# Patient Record
Sex: FEMALE | Race: WHITE | Employment: FULL TIME | URBAN - METROPOLITAN AREA
[De-identification: names, ages, dates, MRNs, and addresses within clinical notes are randomized per-mention and may not be internally consistent; named-entity substitution may affect disease eponyms.]

---

## 2017-04-19 ENCOUNTER — HOSPITAL ENCOUNTER (OUTPATIENT)
Age: 27
Setting detail: SPECIMEN
Discharge: HOME | End: 2017-04-19
Payer: OTHER GOVERNMENT

## 2017-04-19 ENCOUNTER — OFFICE VISIT (OUTPATIENT)
Dept: OBGYN CLINIC | Age: 27
End: 2017-04-19
Payer: OTHER GOVERNMENT

## 2017-04-19 VITALS
RESPIRATION RATE: 16 BRPM | DIASTOLIC BLOOD PRESSURE: 83 MMHG | WEIGHT: 152 LBS | OXYGEN SATURATION: 100 % | SYSTOLIC BLOOD PRESSURE: 113 MMHG | HEIGHT: 62 IN | HEART RATE: 103 BPM | BODY MASS INDEX: 27.97 KG/M2

## 2017-04-19 DIAGNOSIS — Z01.419 ENCOUNTER FOR GYNECOLOGICAL EXAMINATION WITHOUT ABNORMAL FINDING: Primary | ICD-10-CM

## 2017-04-19 PROCEDURE — 99385 PREV VISIT NEW AGE 18-39: CPT | Performed by: OBSTETRICS & GYNECOLOGY

## 2017-04-19 RX ORDER — TRAZODONE HYDROCHLORIDE 50 MG/1
1 TABLET ORAL
COMMUNITY
Start: 2017-03-24 | End: 2018-02-12

## 2017-04-19 RX ORDER — CYCLOBENZAPRINE HCL 10 MG
1 TABLET ORAL
COMMUNITY
Start: 2017-02-18 | End: 2018-02-12

## 2017-04-19 RX ORDER — SERTRALINE HYDROCHLORIDE 100 MG/1
1 TABLET, FILM COATED ORAL
COMMUNITY
Start: 2017-03-24 | End: 2018-02-12

## 2017-04-19 RX ORDER — TRAMADOL HYDROCHLORIDE 50 MG/1
1 TABLET ORAL
COMMUNITY
Start: 2017-02-02 | End: 2018-02-12

## 2017-04-19 RX ORDER — IBUPROFEN 800 MG/1
1 TABLET ORAL
COMMUNITY
Start: 2017-02-02 | End: 2018-02-12

## 2017-04-19 RX ORDER — CYCLOBENZAPRINE HCL 5 MG
1 TABLET ORAL
COMMUNITY
Start: 2017-02-02 | End: 2018-02-12

## 2017-04-19 RX ORDER — LAMOTRIGINE 25 MG/1
1 TABLET ORAL
COMMUNITY
Start: 2017-03-24 | End: 2018-02-12

## 2017-04-20 RX ORDER — LEVONORGESTREL AND ETHINYL ESTRADIOL 0.15-0.03
1 KIT ORAL DAILY
Qty: 1 PACKET | Refills: 11 | Status: SHIPPED | OUTPATIENT
Start: 2017-04-20 | End: 2018-04-16 | Stop reason: ALTCHOICE

## 2017-04-26 LAB — CYTOLOGY REPORT: NORMAL

## 2018-02-12 ENCOUNTER — OFFICE VISIT (OUTPATIENT)
Dept: OBGYN CLINIC | Age: 28
End: 2018-02-12
Payer: OTHER GOVERNMENT

## 2018-02-12 VITALS
BODY MASS INDEX: 25.23 KG/M2 | HEART RATE: 89 BPM | OXYGEN SATURATION: 99 % | HEIGHT: 62 IN | WEIGHT: 137.13 LBS | DIASTOLIC BLOOD PRESSURE: 86 MMHG | SYSTOLIC BLOOD PRESSURE: 118 MMHG

## 2018-02-12 DIAGNOSIS — B37.9 YEAST INFECTION: Primary | ICD-10-CM

## 2018-02-12 PROCEDURE — 99213 OFFICE O/P EST LOW 20 MIN: CPT | Performed by: ADVANCED PRACTICE MIDWIFE

## 2018-02-12 RX ORDER — FLUCONAZOLE 150 MG/1
150 TABLET ORAL ONCE
Qty: 1 TABLET | Refills: 0 | Status: SHIPPED | OUTPATIENT
Start: 2018-02-12 | End: 2018-02-12

## 2018-02-12 NOTE — PROGRESS NOTES
tender to touch. Brown discharge noted      Wet Prep: pH 4.5, lack of lactobacilli, epithelial cells WNL, whiff negative, buds present      ASSESSMENT:  Vaginal yeast infection      PLAN;   Oral Diflucan ordered. Will f/up in 5 days if no improvement of signs and symptoms. Discussed vitamin E oil to ease external irritation and only washing with warm water. Pt reported verbal understanding. Return for annual exam.  Pap per current recommendations. Patient was seen with total face to face time of 15 minutes. More than 50% of this visit was on counseling and education regarding her   1. Yeast infection     and her options. She was also counseled on her preventative health maintenance recommendations and follow-up.

## 2018-02-14 ENCOUNTER — OFFICE VISIT (OUTPATIENT)
Dept: FAMILY MEDICINE CLINIC | Age: 28
End: 2018-02-14
Payer: OTHER GOVERNMENT

## 2018-02-14 ENCOUNTER — HOSPITAL ENCOUNTER (OUTPATIENT)
Age: 28
Setting detail: SPECIMEN
Discharge: HOME | End: 2018-02-14
Payer: OTHER GOVERNMENT

## 2018-02-14 VITALS
RESPIRATION RATE: 16 BRPM | BODY MASS INDEX: 24.29 KG/M2 | HEART RATE: 96 BPM | SYSTOLIC BLOOD PRESSURE: 107 MMHG | WEIGHT: 132 LBS | DIASTOLIC BLOOD PRESSURE: 65 MMHG | OXYGEN SATURATION: 99 % | HEIGHT: 62 IN

## 2018-02-14 DIAGNOSIS — F41.9 ANXIETY AND DEPRESSION: ICD-10-CM

## 2018-02-14 DIAGNOSIS — Z76.89 ENCOUNTER TO ESTABLISH CARE: ICD-10-CM

## 2018-02-14 DIAGNOSIS — Z76.89 ENCOUNTER TO ESTABLISH CARE: Primary | ICD-10-CM

## 2018-02-14 DIAGNOSIS — F17.200 SMOKING: ICD-10-CM

## 2018-02-14 DIAGNOSIS — F43.10 PTSD (POST-TRAUMATIC STRESS DISORDER): ICD-10-CM

## 2018-02-14 DIAGNOSIS — F32.A ANXIETY AND DEPRESSION: ICD-10-CM

## 2018-02-14 LAB
ABSOLUTE EOS #: 0.09 K/UL (ref 0–0.44)
ABSOLUTE IMMATURE GRANULOCYTE: <0.03 K/UL (ref 0–0.3)
ABSOLUTE LYMPH #: 2.37 K/UL (ref 1.1–3.7)
ABSOLUTE MONO #: 0.77 K/UL (ref 0.1–1.2)
ALBUMIN SERPL-MCNC: 4.3 G/DL (ref 3.5–5.2)
ALBUMIN/GLOBULIN RATIO: 1.7 (ref 1–2.5)
ALP BLD-CCNC: 57 U/L (ref 35–104)
ALT SERPL-CCNC: 19 U/L (ref 5–33)
ANION GAP SERPL CALCULATED.3IONS-SCNC: 13 MMOL/L (ref 9–17)
AST SERPL-CCNC: 15 U/L
BASOPHILS # BLD: 1 % (ref 0–2)
BASOPHILS ABSOLUTE: 0.04 K/UL (ref 0–0.2)
BILIRUB SERPL-MCNC: 1.35 MG/DL (ref 0.3–1.2)
BUN BLDV-MCNC: 10 MG/DL (ref 6–20)
BUN/CREAT BLD: ABNORMAL (ref 9–20)
CALCIUM SERPL-MCNC: 8.9 MG/DL (ref 8.6–10.4)
CHLORIDE BLD-SCNC: 102 MMOL/L (ref 98–107)
CHOLESTEROL/HDL RATIO: 3.6
CHOLESTEROL: 183 MG/DL
CO2: 25 MMOL/L (ref 20–31)
CREAT SERPL-MCNC: 0.72 MG/DL (ref 0.5–0.9)
DIFFERENTIAL TYPE: NORMAL
EOSINOPHILS RELATIVE PERCENT: 1 % (ref 1–4)
GFR AFRICAN AMERICAN: >60 ML/MIN
GFR NON-AFRICAN AMERICAN: >60 ML/MIN
GFR SERPL CREATININE-BSD FRML MDRD: ABNORMAL ML/MIN/{1.73_M2}
GFR SERPL CREATININE-BSD FRML MDRD: ABNORMAL ML/MIN/{1.73_M2}
GLUCOSE BLD-MCNC: 93 MG/DL (ref 70–99)
HCT VFR BLD CALC: 43.7 % (ref 36.3–47.1)
HDLC SERPL-MCNC: 51 MG/DL
HEMOGLOBIN: 14.1 G/DL (ref 11.9–15.1)
IMMATURE GRANULOCYTES: 0 %
LDL CHOLESTEROL: 117 MG/DL (ref 0–130)
LYMPHOCYTES # BLD: 33 % (ref 24–43)
MCH RBC QN AUTO: 30.5 PG (ref 25.2–33.5)
MCHC RBC AUTO-ENTMCNC: 32.3 G/DL (ref 28.4–34.8)
MCV RBC AUTO: 94.6 FL (ref 82.6–102.9)
MONOCYTES # BLD: 11 % (ref 3–12)
NRBC AUTOMATED: 0 PER 100 WBC
PDW BLD-RTO: 12.5 % (ref 11.8–14.4)
PLATELET # BLD: 212 K/UL (ref 138–453)
PLATELET ESTIMATE: NORMAL
PMV BLD AUTO: 12.2 FL (ref 8.1–13.5)
POTASSIUM SERPL-SCNC: 3.7 MMOL/L (ref 3.7–5.3)
RBC # BLD: 4.62 M/UL (ref 3.95–5.11)
RBC # BLD: NORMAL 10*6/UL
SEG NEUTROPHILS: 54 % (ref 36–65)
SEGMENTED NEUTROPHILS ABSOLUTE COUNT: 4 K/UL (ref 1.5–8.1)
SODIUM BLD-SCNC: 140 MMOL/L (ref 135–144)
TOTAL PROTEIN: 6.9 G/DL (ref 6.4–8.3)
TRIGL SERPL-MCNC: 74 MG/DL
TSH SERPL DL<=0.05 MIU/L-ACNC: 2.39 MIU/L (ref 0.3–5)
VITAMIN B-12: 567 PG/ML (ref 232–1245)
VLDLC SERPL CALC-MCNC: NORMAL MG/DL (ref 1–30)
WBC # BLD: 7.3 K/UL (ref 3.5–11.3)
WBC # BLD: NORMAL 10*3/UL

## 2018-02-14 PROCEDURE — 99203 OFFICE O/P NEW LOW 30 MIN: CPT | Performed by: FAMILY MEDICINE

## 2018-02-14 RX ORDER — BUPROPION HYDROCHLORIDE 150 MG/1
150 TABLET ORAL 2 TIMES DAILY
Qty: 60 TABLET | Refills: 0 | Status: SHIPPED | OUTPATIENT
Start: 2018-02-14 | End: 2018-03-14

## 2018-02-14 ASSESSMENT — PATIENT HEALTH QUESTIONNAIRE - PHQ9
SUM OF ALL RESPONSES TO PHQ9 QUESTIONS 1 & 2: 2
1. LITTLE INTEREST OR PLEASURE IN DOING THINGS: 1
2. FEELING DOWN, DEPRESSED OR HOPELESS: 1
SUM OF ALL RESPONSES TO PHQ QUESTIONS 1-9: 2

## 2018-02-14 ASSESSMENT — ENCOUNTER SYMPTOMS
BLOOD IN STOOL: 0
EYE PAIN: 0
SHORTNESS OF BREATH: 0
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
## 2018-02-21 ENCOUNTER — OFFICE VISIT (OUTPATIENT)
Dept: PSYCHOLOGY | Age: 28
End: 2018-02-21
Payer: OTHER GOVERNMENT

## 2018-02-21 DIAGNOSIS — F33.1 MODERATE EPISODE OF RECURRENT MAJOR DEPRESSIVE DISORDER (HCC): Primary | ICD-10-CM

## 2018-02-21 PROCEDURE — 99999 PR OFFICE/OUTPT VISIT,PROCEDURE ONLY: CPT | Performed by: PSYCHOLOGIST

## 2018-02-21 PROCEDURE — 90834 PSYTX W PT 45 MINUTES: CPT | Performed by: PSYCHOLOGIST

## 2018-02-21 ASSESSMENT — PATIENT HEALTH QUESTIONNAIRE - PHQ9
8. MOVING OR SPEAKING SO SLOWLY THAT OTHER PEOPLE COULD HAVE NOTICED. OR THE OPPOSITE, BEING SO FIGETY OR RESTLESS THAT YOU HAVE BEEN MOVING AROUND A LOT MORE THAN USUAL: 2
2. FEELING DOWN, DEPRESSED OR HOPELESS: 1
SUM OF ALL RESPONSES TO PHQ9 QUESTIONS 1 & 2: 4
10. IF YOU CHECKED OFF ANY PROBLEMS, HOW DIFFICULT HAVE THESE PROBLEMS MADE IT FOR YOU TO DO YOUR WORK, TAKE CARE OF THINGS AT HOME, OR GET ALONG WITH OTHER PEOPLE: 2
5. POOR APPETITE OR OVEREATING: 1
6. FEELING BAD ABOUT YOURSELF - OR THAT YOU ARE A FAILURE OR HAVE LET YOURSELF OR YOUR FAMILY DOWN: 1
SUM OF ALL RESPONSES TO PHQ QUESTIONS 1-9: 16
9. THOUGHTS THAT YOU WOULD BE BETTER OFF DEAD, OR OF HURTING YOURSELF: 0
3. TROUBLE FALLING OR STAYING ASLEEP: 3
1. LITTLE INTEREST OR PLEASURE IN DOING THINGS: 3
4. FEELING TIRED OR HAVING LITTLE ENERGY: 3
7. TROUBLE CONCENTRATING ON THINGS, SUCH AS READING THE NEWSPAPER OR WATCHING TELEVISION: 2
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
## 2018-02-21 NOTE — PATIENT INSTRUCTIONS
others, avoiding friends/family. Restlessness, difficulty concentrating or paying attention. Trouble making decisions. Physical:  Exhaustion/Fatigue  Decreased energy  Memory problems  Upset stomach, nausea, vomiting  Vague Pain (Aches), Headaches/Migraines    Additional symptoms that should be concerning and are a signal for professional consultation or intervention include:  Use of drugs, alcohol, violence, and thoughts of killing oneself. If you are thinking about suicide contact 911 or go directly to the Emergency Room. The Stages of Grief  Grief therapists often describe stages of grief outlined by the research of Dr. Lorena Helton. These stages do not always go in order. You may move back and forth among some of the stages and may even skip some of them. These stages are meant as a guide to help you understand your reactions and those of others who are grieving.    - Denial:  Denial (not acknowledging the loss) can help contain the shock of loss. Denial can act as a safety mechanism to block out grief until we are ready to handle it. - Sadness and depression:  Deep, intense grief and mourning appear during this stage. When the full understanding of our loss comes, it can seem overwhelming. During this stage, you may cry often and unexpectedly. You may not want to be around people or to do things that you normally enjoy. During this stage, it is best to remain as active as possible and to seek supportive people who will allow you to say what you need to or to cry when you need to. It is important to allow yourself to work through your full range and experience of emotions. - Anger:  Rage and anger can be intense toward the person who , toward friends and relatives, towards yourself, and even toward God. It is important to have an outlet to release anger through activities such as exercise, hobbies, Anabaptist ceremonies or meeting with Rastafarian leader(s), or through therapy.  Guilt, invigorating. Sometimes try walking slowly enough so you can look carefully at what you see. Observe what nature has to offer you, what it can teach you. Enjoy as much as you are able to of the sights and sounds that come your way. If you like, walk with another person. 3. Carry or wear a linking object. Carry something in your pocket or purse that reminds you of the one who  - a keepsake they gave you perhaps, or small object they once carried or used or a memento you select for just this purpose. You might wear a piece of their jewelry in the same way. Whenever you want, reach for gaze upon this object and remember what it signifies. 4. Visit the grave. Not all people prefer to do this. But if it feels right to you, then do so. Dont let others convince you this is a morbid thing to do. Spend whatever time feels right there. Stand or sit in the quietness and do what comes naturally: be silent or talk, breathe deeply or cry, recollect or pray. You may wish to add your distinctive touch to the gravesite - straighten it a bit, or add little signs of your love. 5. Create a memory book. Compile photographs, which document your loved ones life. Arrange them into some sort of order so they tell a story. Add other elements if you want: diplomas, newspaper clippings, awards, accomplishments, and reminders of significant events. Put all this in a special binder and keep it for other people to look at if they wish. Go through it on your own if you desire. Reminisce as you do so. 6. Recall your dreams. Your dreams often have important things to say about your feelings and about your relationship with the one who . Your dreams may be scary or sad, especially early on. They may seem weird or crazy to you. You may find that your loved one appears in your dreams. Accept your dreams for what they are and see what you can learn from them. No one knows that better than you.     7. Tell people what helps you and what doesnt. People around you may not understand what you need. So tell them. If hearing your loved ones name spoken aloud by others feels good, say so. If you need more time alone, or assistance with chores youre unable to complete, or an occasional hug, be honest.  People cant read your mind, so youll have to speak it. 8. Write things down. Most people who are grieving become more forgetful than usual.  So help yourself remember what you want by keeping track of it on paper or with whatever system works best for you. This may include writing down things you want to preserve about the person who has . 9. Ask for a copy of the Russell's. If the  liturgy or memorial service holds special meaning for you because of what was spoken or read, ask for the words. Whoever participated in that ritual will feel gratified that what they prepared was appreciated. Turn to these words whenever you want. Some people find these thoughts provide even more help weeks and months after the service. 10. Remember the serenity prayer. This prayer is attributed to theologian Ignacio Lowe, but its actually an ancient  [facility]. It has brought comfort and support to many that have suffered various kinds of afflictions. 11. Create a memory area at home. In a space that feels appropriate, arrange a small table that honors the person: a framed photograph or two, perhaps a prized possession or award or something they created or something they loved. This might be placed on a small table, a mantel or a desk. Some people like to use a grouping of candles, representing not just the person who  but others who have  as well. In that case a variety of candles can be arranged each representing a unique life. 12. Drink water. Grieving people can easily become dehydrated. Crying can naturally lead to that.   And with your normal routines turned upside down,

## 2018-02-28 ENCOUNTER — OFFICE VISIT (OUTPATIENT)
Dept: PSYCHOLOGY | Age: 28
End: 2018-02-28
Payer: OTHER GOVERNMENT

## 2018-02-28 DIAGNOSIS — F33.1 MODERATE EPISODE OF RECURRENT MAJOR DEPRESSIVE DISORDER (HCC): Primary | ICD-10-CM

## 2018-02-28 DIAGNOSIS — F43.10 POSTTRAUMATIC STRESS DISORDER: ICD-10-CM

## 2018-02-28 PROCEDURE — 90837 PSYTX W PT 60 MINUTES: CPT | Performed by: PSYCHOLOGIST

## 2018-02-28 NOTE — PROGRESS NOTES
her. Discussed recommendation for more long-term services. O:  MSE:   Mood was anxious with congruent affect. Suicidal ideation was denied. Homicidal ideation was denied. Hygiene was good . Dress was appropriate. Behavior was Restless & fidgety with No observation or self-report of difficulties ambulating. Attitude was Help-seeking. Eye-contact was good. Speech: rate- WNL, rhythm-  WNL, volume- WNL  Verbalizations were  tangential and verbose. Thought processes were not intact and goal-oriented without evidence of delusions, hallucinations, obsessions, or libia; with significant cognitive distortions. Associations were characterized by tangential and flight of ideas cognitive processes. Pt was orientated oriented to person, place, time, and general circumstances;  Memory was good  Insight and judgment were estimated to be fair, AEB, a fair  understanding of cyclical maladaptive patterns, and the ability to use insight to inform behavior change. A:   PHQ-9 Total Score: 16 (2/21/2018 10:25 AM)    Patient has a history of trauma and difficulties with her attachment figures. These experiences have impacted her view of the world. She would benefit from mor long-term specialized services to address her trauma history and help her process through the grief of the death of her support animal whom she was very attached to. She would benefit from learning alternative coping strategies. Pt interventions:  Provided handout on  grounding exercises, Discussed self-care (sleep, nutrition, rewarding activities, social support, exercise), Discussed benefits of referral for specialty care, Motivational Interviewing to increase patient confidence and compliance with adhering to behavioral change plan, Supportive techniques, Provided Psychoeducation re: grounding exercises  and Emphasized importance of regular practice of relaxation strategies to target / promote stress reduction. Discussed grief.        Pt Behavioral Change Plan:   Patient Instructions   1. Read grief handouts   2. Engage in self-care activities   3. Use relaxation/mood Apps  4. Read grounding exercises and practice   5. Search for individual therapy providers:  -www. [email or web address].[email or web address]. Hively  -[email or web address] [email or web address]/ProviderSelection/  -http://TripHobo/Insurance.htm    Apps:    \"calm\"    [email or web address]. com    Mood tracker    Relax    Calendar 31     Stop, Breathe, and Think    headspace    Stop panic     Meditation studio    Momentum    Mindfulness         Diagnosis:  The primary encounter diagnosis was Moderate episode of recurrent major depressive disorder ([facility] [address] A diagnosis of Posttraumatic stress disorder was also pertinent to this visit. History:    Medications:   Current Outpatient Prescriptions   Medication Sig Dispense Refill    buPROPion (WELLBUTRIN XL) 150 MG extended release tablet Take 1 tablet by mouth 2 times daily 60 tablet 0    levonorgestrel-ethinyl estradiol (LEVORA 0.15/30, 28,) 0.15-30 MG-MCG per tablet Take 1 tablet by mouth daily 1 packet 11     No current facility-administered medications for this visit. Social History:   Social History     Social History    Marital status:      Spouse name: N/A    Number of children: N/A    Years of education: N/A     Occupational History    Not on file. Social History Main Topics    Smoking status: Current Every Day Smoker    Smokeless tobacco: Never Used      Comment: zaps     Alcohol use Yes      Comment: social     Drug use: No    Sexual activity: Yes     Partners: Male     Other Topics Concern    Not on file     Social History Narrative    No narrative on file       TOBACCO:   reports that she has been smoking. She has never used smokeless tobacco.  ETOH:   reports that she drinks alcohol.     Family History:   Family History   Problem Relation Age of Onset    Depression Mother     Physical Abuse Father     Coronary Art Dis Maternal

## 2018-02-28 NOTE — PATIENT INSTRUCTIONS
1. Read grief handouts   2. Engage in self-care activities   3. Use relaxation/mood Apps  4. Read grounding exercises and practice   5. Search for individual therapy providers:  -www. Lockr. [employer]  -[email or web address]/ProviderSelection/  -http://Zaelab/Insurance.htm    Apps:    \"calm\"    [email or web address]€™s. com    Mood tracker    Relax    Calendar 31     Stop, Breathe, and Think    headspace    Stop panic     Meditation studio    Momentum    Mindfulness

## 2018-03-14 ENCOUNTER — OFFICE VISIT (OUTPATIENT)
Dept: FAMILY MEDICINE CLINIC | Age: 28
End: 2018-03-14
Payer: OTHER GOVERNMENT

## 2018-03-14 VITALS
OXYGEN SATURATION: 96 % | RESPIRATION RATE: 16 BRPM | HEART RATE: 83 BPM | SYSTOLIC BLOOD PRESSURE: 121 MMHG | WEIGHT: 135.4 LBS | BODY MASS INDEX: 24.92 KG/M2 | DIASTOLIC BLOOD PRESSURE: 81 MMHG | HEIGHT: 62 IN

## 2018-03-14 DIAGNOSIS — F17.200 SMOKING: ICD-10-CM

## 2018-03-14 DIAGNOSIS — F41.9 ANXIETY AND DEPRESSION: Primary | ICD-10-CM

## 2018-03-14 DIAGNOSIS — Z23 NEED FOR TDAP VACCINATION: ICD-10-CM

## 2018-03-14 DIAGNOSIS — Z23 NEED FOR PNEUMOCOCCAL VACCINATION: ICD-10-CM

## 2018-03-14 DIAGNOSIS — F43.10 PTSD (POST-TRAUMATIC STRESS DISORDER): ICD-10-CM

## 2018-03-14 DIAGNOSIS — Z13.31 POSITIVE DEPRESSION SCREENING: ICD-10-CM

## 2018-03-14 DIAGNOSIS — F32.A ANXIETY AND DEPRESSION: Primary | ICD-10-CM

## 2018-03-14 PROCEDURE — 90472 IMMUNIZATION ADMIN EACH ADD: CPT | Performed by: FAMILY MEDICINE

## 2018-03-14 PROCEDURE — G8431 POS CLIN DEPRES SCRN F/U DOC: HCPCS | Performed by: FAMILY MEDICINE

## 2018-03-14 PROCEDURE — 90732 PPSV23 VACC 2 YRS+ SUBQ/IM: CPT | Performed by: FAMILY MEDICINE

## 2018-03-14 PROCEDURE — 99214 OFFICE O/P EST MOD 30 MIN: CPT | Performed by: FAMILY MEDICINE

## 2018-03-14 PROCEDURE — 90715 TDAP VACCINE 7 YRS/> IM: CPT | Performed by: FAMILY MEDICINE

## 2018-03-14 PROCEDURE — 90471 IMMUNIZATION ADMIN: CPT | Performed by: FAMILY MEDICINE

## 2018-03-14 ASSESSMENT — ENCOUNTER SYMPTOMS
SHORTNESS OF BREATH: 0
EYE PAIN: 0
BLOOD IN STOOL: 0

## 2018-03-14 NOTE — PROGRESS NOTES
1 tablet by mouth 2 times daily, Disp: 60 tablet, Rfl: 0    levonorgestrel-ethinyl estradiol (LEVORA 0.15/30, 28,) 0.15-30 MG-MCG per tablet, Take 1 tablet by mouth daily, Disp: 1 packet, Rfl: 11    Subjective:     Review of Systems   Constitutional: Negative for appetite change, diaphoresis, fever and unexpected weight change. HENT: Negative for ear pain. Eyes: Negative for pain. Respiratory: Negative for shortness of breath. Cardiovascular: Negative for chest pain and leg swelling. Gastrointestinal: Negative for blood in stool. Psychiatric/Behavioral: Positive for dysphoric mood. Negative for suicidal ideas. The patient is nervous/anxious. Objective:     /81   Pulse 83   Resp 16   Ht 5' 2.01\" (1.575 m)   Wt 135 lb 6.4 oz (61.4 kg)   LMP 02/07/2018   SpO2 96%   BMI 24.76 kg/m²     Physical Exam   Constitutional: She is oriented to person, place, and time. She appears well-developed. HENT:   Head: Normocephalic. Eyes: Conjunctivae and EOM are normal.   Cardiovascular: Normal rate, regular rhythm and normal heart sounds. No murmur heard. Pulmonary/Chest: Effort normal and breath sounds normal. No respiratory distress. She has no wheezes. Neurological: She is alert and oriented to person, place, and time. Skin: She is not diaphoretic. Psychiatric: She has a normal mood and affect. Her behavior is normal. Judgment and thought content normal.       Assessment & Plan:      1. Anxiety and depression, PTSD  Patient stopped the Wellbutrin as it was worsening her cravings for cigarettes. She is agreeable to trying Zoloft as it worked well for her in the past.  She'll follow-up in one month for reassessment. She'll continue seeing behavioral health. - sertraline (ZOLOFT) 50 MG tablet; Take 1 tablet by mouth daily  Dispense: 30 tablet; Refill: 0    Smoking  Tried chantix in the past and found she became aggressive (become arrested).  Has the toll-free number for the smoking

## 2018-04-09 PROBLEM — B37.9 YEAST INFECTION: Status: RESOLVED | Noted: 2018-02-12 | Resolved: 2018-04-09

## 2018-04-16 ENCOUNTER — HOSPITAL ENCOUNTER (OUTPATIENT)
Age: 28
Setting detail: SPECIMEN
Discharge: HOME | End: 2018-04-16
Payer: OTHER GOVERNMENT

## 2018-04-16 ENCOUNTER — OFFICE VISIT (OUTPATIENT)
Dept: OBGYN CLINIC | Age: 28
End: 2018-04-16
Payer: OTHER GOVERNMENT

## 2018-04-16 ENCOUNTER — OFFICE VISIT (OUTPATIENT)
Dept: FAMILY MEDICINE CLINIC | Age: 28
End: 2018-04-16
Payer: OTHER GOVERNMENT

## 2018-04-16 VITALS
WEIGHT: 137 LBS | HEART RATE: 88 BPM | BODY MASS INDEX: 25.21 KG/M2 | DIASTOLIC BLOOD PRESSURE: 84 MMHG | SYSTOLIC BLOOD PRESSURE: 124 MMHG | HEIGHT: 62 IN

## 2018-04-16 VITALS
SYSTOLIC BLOOD PRESSURE: 107 MMHG | DIASTOLIC BLOOD PRESSURE: 72 MMHG | WEIGHT: 137 LBS | BODY MASS INDEX: 25.21 KG/M2 | HEART RATE: 89 BPM | HEIGHT: 62 IN

## 2018-04-16 DIAGNOSIS — F41.9 ANXIETY AND DEPRESSION: Primary | ICD-10-CM

## 2018-04-16 DIAGNOSIS — F43.10 PTSD (POST-TRAUMATIC STRESS DISORDER): ICD-10-CM

## 2018-04-16 DIAGNOSIS — Z01.419 WELL WOMAN EXAM WITH ROUTINE GYNECOLOGICAL EXAM: Primary | ICD-10-CM

## 2018-04-16 DIAGNOSIS — F32.A ANXIETY AND DEPRESSION: Primary | ICD-10-CM

## 2018-04-16 PROBLEM — Z98.891 HISTORY OF LOW TRANSVERSE CESAREAN SECTION: Status: ACTIVE | Noted: 2018-04-16

## 2018-04-16 PROCEDURE — 99213 OFFICE O/P EST LOW 20 MIN: CPT | Performed by: FAMILY MEDICINE

## 2018-04-16 PROCEDURE — 99395 PREV VISIT EST AGE 18-39: CPT | Performed by: OBSTETRICS & GYNECOLOGY

## 2018-04-16 RX ORDER — LEVONORGESTREL AND ETHINYL ESTRADIOL 0.15-0.03
1 KIT ORAL DAILY
COMMUNITY
End: 2018-08-28 | Stop reason: ALTCHOICE

## 2018-04-16 RX ORDER — SERTRALINE HYDROCHLORIDE 100 MG/1
100 TABLET, FILM COATED ORAL DAILY
Qty: 30 TABLET | Refills: 0 | Status: SHIPPED | OUTPATIENT
Start: 2018-04-16 | End: 2018-09-16

## 2018-04-16 RX ORDER — BUSPIRONE HYDROCHLORIDE 5 MG/1
5 TABLET ORAL 3 TIMES DAILY
Qty: 90 TABLET | Refills: 0 | Status: SHIPPED | OUTPATIENT
Start: 2018-04-16 | End: 2018-09-16

## 2018-04-16 RX ORDER — MELOXICAM 15 MG/1
15 TABLET ORAL DAILY
COMMUNITY
End: 2018-04-16 | Stop reason: ALTCHOICE

## 2018-04-16 ASSESSMENT — ENCOUNTER SYMPTOMS
BLOOD IN STOOL: 0
EYE PAIN: 0
SHORTNESS OF BREATH: 0

## 2018-04-26 RX ORDER — LEVONORGESTREL AND ETHINYL ESTRADIOL 0.15-0.03
1 KIT ORAL DAILY
Qty: 1 PACKET | Refills: 11 | Status: SHIPPED | OUTPATIENT
Start: 2018-04-26 | End: 2018-08-28 | Stop reason: ALTCHOICE

## 2018-05-03 LAB — CYTOLOGY REPORT: NORMAL

## 2018-06-04 ENCOUNTER — TELEPHONE (OUTPATIENT)
Dept: FAMILY MEDICINE CLINIC | Age: 28
End: 2018-06-04

## 2018-06-07 ENCOUNTER — OFFICE VISIT (OUTPATIENT)
Dept: FAMILY MEDICINE CLINIC | Age: 28
End: 2018-06-07
Payer: OTHER GOVERNMENT

## 2018-06-07 VITALS
BODY MASS INDEX: 25.4 KG/M2 | HEART RATE: 90 BPM | HEIGHT: 62 IN | OXYGEN SATURATION: 97 % | RESPIRATION RATE: 16 BRPM | WEIGHT: 138 LBS | DIASTOLIC BLOOD PRESSURE: 79 MMHG | SYSTOLIC BLOOD PRESSURE: 107 MMHG

## 2018-06-07 DIAGNOSIS — R23.8 CHANGE OF SKIN COLOR: Primary | ICD-10-CM

## 2018-06-07 PROCEDURE — 99213 OFFICE O/P EST LOW 20 MIN: CPT | Performed by: FAMILY MEDICINE

## 2018-06-07 ASSESSMENT — ENCOUNTER SYMPTOMS
CHEST TIGHTNESS: 0
COLOR CHANGE: 1
SHORTNESS OF BREATH: 0

## 2018-08-23 LAB
ANA TITER: NORMAL
C-REACTIVE PROTEIN: NORMAL
RHEUMATOID FACTOR: NORMAL
SEDIMENTATION RATE, ERYTHROCYTE: NORMAL

## 2018-08-27 DIAGNOSIS — R23.8 CHANGE OF SKIN COLOR: ICD-10-CM

## 2018-08-28 ENCOUNTER — OFFICE VISIT (OUTPATIENT)
Dept: OBGYN CLINIC | Age: 28
End: 2018-08-28
Payer: OTHER GOVERNMENT

## 2018-08-28 VITALS
HEART RATE: 78 BPM | BODY MASS INDEX: 26.51 KG/M2 | WEIGHT: 145 LBS | DIASTOLIC BLOOD PRESSURE: 85 MMHG | SYSTOLIC BLOOD PRESSURE: 127 MMHG

## 2018-08-28 DIAGNOSIS — N76.0 ACUTE VAGINITIS: Primary | ICD-10-CM

## 2018-08-28 PROCEDURE — 99213 OFFICE O/P EST LOW 20 MIN: CPT | Performed by: OBSTETRICS & GYNECOLOGY

## 2018-08-28 NOTE — PROGRESS NOTES
The patient was seen today. She is here regarding med management . Her bowels are regular and she is voiding without difficulty. HPI: 28yo with vaginal dryness and tearing on OCP wants to return to patch to control cycles which she did well on when she was younger. Does not like lubricants because they cause a yeast infection. Past Medical History:   Diagnosis Date    Anxiety     Depression     HPV (human papilloma virus) anogenital infection     PTSD (post-traumatic stress disorder)      Past Surgical History:   Procedure Laterality Date     SECTION  2008    TONSILLECTOMY      WISDOM TOOTH EXTRACTION       REVIEW OF SYSTEMS:        A minimum of an eleven point review of systems was completed and found to be negative except for the pertinent positives found below. Constitutional: No fever, chills or malaise; No weight change or fatigue  Head and Eyes: No vision, Headache, Dizziness or trauma in last 12 months  ENT ROS: No hearing, Tinnitis, sinus or taste problems  Hematological and Lymphatic ROS:No Lymphoma, Von Willebrand's, Hemophillia or Bleeding History  Psych ROS: No Depression, Homicidal thoughts,suicidal thoughts, or anxiety  Breast ROS: No prior breast abnormalities or lumps  Respiratory ROS: No SOB, Pneumoniae,Cough, or Pulmonary Embolism History  Cardiovascular ROS: No Chest Pain with Exertion, Palpitations, Syncope, Edema, Arrhythmia  Gastrointestinal ROS: No Indigestion, Heartburn, Nausea, vomiting, Diahrea, Constipation,or Bowel Changes; No Bloody Stools or melena  Genito-Urinary ROS: No Dysuria, Hematuria or Nocturia.  No Urinary Incontinence or Vaginal Discharge  Musculoskeletal ROS: No Arthralgia, Arthritis,Gout,Osteoporosis or Rheumatism  Neurological ROS: No CVA, Migraines, Epilepsy, Seizure Hx, or Limb Weakness  Dermatological ROS: No Rash, Itching, Hives, Mole Changes or Cancer                                            Blood pressure 127/85, pulse 78, weight 145 lb (65.8

## 2018-09-16 ENCOUNTER — APPOINTMENT (OUTPATIENT)
Dept: GENERAL RADIOLOGY | Age: 28
End: 2018-09-16
Payer: OTHER GOVERNMENT

## 2018-09-16 ENCOUNTER — HOSPITAL ENCOUNTER (EMERGENCY)
Age: 28
Discharge: HOME | End: 2018-09-16
Attending: EMERGENCY MEDICINE
Payer: OTHER GOVERNMENT

## 2018-09-16 VITALS
WEIGHT: 141 LBS | DIASTOLIC BLOOD PRESSURE: 79 MMHG | TEMPERATURE: 98.6 F | SYSTOLIC BLOOD PRESSURE: 114 MMHG | HEIGHT: 62 IN | OXYGEN SATURATION: 98 % | HEART RATE: 97 BPM | BODY MASS INDEX: 25.95 KG/M2 | RESPIRATION RATE: 16 BRPM

## 2018-09-16 DIAGNOSIS — T14.8XXA ABRASION: ICD-10-CM

## 2018-09-16 DIAGNOSIS — S62.646A CLOSED NONDISPLACED FRACTURE OF PROXIMAL PHALANX OF RIGHT LITTLE FINGER, INITIAL ENCOUNTER: Primary | ICD-10-CM

## 2018-09-16 PROCEDURE — 29125 APPL SHORT ARM SPLINT STATIC: CPT

## 2018-09-16 PROCEDURE — 99283 EMERGENCY DEPT VISIT LOW MDM: CPT

## 2018-09-16 PROCEDURE — 96372 THER/PROPH/DIAG INJ SC/IM: CPT

## 2018-09-16 PROCEDURE — 73130 X-RAY EXAM OF HAND: CPT

## 2018-09-16 PROCEDURE — 6360000002 HC RX W HCPCS: Performed by: EMERGENCY MEDICINE

## 2018-09-16 RX ORDER — CEPHALEXIN 500 MG/1
500 CAPSULE ORAL 3 TIMES DAILY
Qty: 21 CAPSULE | Refills: 0 | Status: SHIPPED | OUTPATIENT
Start: 2018-09-16 | End: 2018-09-23

## 2018-09-16 RX ORDER — KETOROLAC TROMETHAMINE 30 MG/ML
60 INJECTION, SOLUTION INTRAMUSCULAR; INTRAVENOUS ONCE
Status: COMPLETED | OUTPATIENT
Start: 2018-09-16 | End: 2018-09-16

## 2018-09-16 RX ORDER — IBUPROFEN 600 MG/1
600 TABLET ORAL EVERY 6 HOURS PRN
Qty: 30 TABLET | Refills: 0 | Status: SHIPPED | OUTPATIENT
Start: 2018-09-16 | End: 2019-07-03 | Stop reason: ALTCHOICE

## 2018-09-16 RX ORDER — TRAMADOL HYDROCHLORIDE 50 MG/1
50 TABLET ORAL EVERY 6 HOURS PRN
Qty: 20 TABLET | Refills: 0 | Status: SHIPPED | OUTPATIENT
Start: 2018-09-16 | End: 2018-09-21

## 2018-09-16 RX ADMIN — KETOROLAC TROMETHAMINE 60 MG: 30 INJECTION, SOLUTION INTRAMUSCULAR at 22:02

## 2018-09-16 ASSESSMENT — PAIN SCALES - GENERAL: PAINLEVEL_OUTOF10: 7

## 2018-09-17 NOTE — ED PROVIDER NOTES
[address] [place] [address] ED  eMERGENCY dEPARTMENT eNCOUnter      Pt Name: Belén Serna  MRN: [id number]  [place] 1990  Date of evaluation: 2018  Provider: Lee Chang MD    CHIEF COMPLAINT       Chief Complaint   Patient presents with    Hand Injury         HISTORY OF PRESENT ILLNESS  (Location/Symptom, Timing/Onset, Context/Setting, Quality, Duration, Modifying Factors, Severity.)   Belén Serna is a 25 y.o. female who presents to the emergency department For evaluation of injury to her right hand. Patient states she tripped over her shoes. She fell. She injured the fifth digit of her right hand. She also scrapes and abrasions to the hand. No head injury or loss of consciousness. No chest pain abdominal pain neck or back pain endorsed. The event occurred just prior to arrival.      Nursing Notes were reviewed. ALLERGIES     Codeine; Tylenol [acetaminophen]; and Vicodin [hydrocodone-acetaminophen]    CURRENT MEDICATIONS       Previous Medications    NORELGESTROMIN-ETHINYL ESTRADIOL (ORTHO EVRA) 150-35 MCG/24HR    Place 1 patch onto the skin every 7 days       PAST MEDICAL HISTORY         Diagnosis Date    Anxiety     Depression     HPV (human papilloma virus) anogenital infection     PTSD (post-traumatic stress disorder)        SURGICAL HISTORY           Procedure Laterality Date     SECTION      TONSILLECTOMY      WISDOM TOOTH EXTRACTION           FAMILY HISTORY       Family History   Problem Relation Age of Onset    Depression Mother     Physical Abuse Father     Coronary Art Dis Maternal Aunt         ovarian cancer     Family Status   Relation Status    Mother (Not Specified)    Father (Not Specified)    MAunt (Not Specified)        SOCIAL HISTORY      reports that she has been smoking. She has never used smokeless tobacco. She reports that she drinks alcohol. She reports that she does not use drugs.     REVIEW OF SYSTEMS    (2-9 systems for level 4, 10 or more for

## 2018-12-19 ENCOUNTER — OFFICE VISIT (OUTPATIENT)
Dept: ORTHOPEDIC SURGERY | Age: 28
End: 2018-12-19
Payer: OTHER GOVERNMENT

## 2018-12-19 VITALS
WEIGHT: 142 LBS | HEIGHT: 62 IN | HEART RATE: 85 BPM | SYSTOLIC BLOOD PRESSURE: 114 MMHG | BODY MASS INDEX: 26.13 KG/M2 | DIASTOLIC BLOOD PRESSURE: 71 MMHG

## 2018-12-19 DIAGNOSIS — S62.616D CLOSED DISPLACED FRACTURE OF PROXIMAL PHALANX OF RIGHT LITTLE FINGER WITH ROUTINE HEALING, SUBSEQUENT ENCOUNTER: Primary | ICD-10-CM

## 2018-12-19 PROCEDURE — 99203 OFFICE O/P NEW LOW 30 MIN: CPT | Performed by: PHYSICIAN ASSISTANT

## 2018-12-19 ASSESSMENT — ENCOUNTER SYMPTOMS
ABDOMINAL PAIN: 0
COUGH: 0
CONSTIPATION: 0
VOMITING: 0
APNEA: 0
DIARRHEA: 0
NAUSEA: 0
CHEST TIGHTNESS: 0
ABDOMINAL DISTENTION: 0
SHORTNESS OF BREATH: 0
COLOR CHANGE: 0

## 2018-12-19 NOTE — PROGRESS NOTES
the tendon in the finger may have been injured or torn which is why the finger still does not have its full ROM. I then told her that it may be best for her to try some hand therapy for the finger and I feel that some Voltaren gel would help with the tendon as well because I feel the tendon may simply be inflamed still. Then if these options do not work then  We will refer her to a hand specialist. The patient has opted for a hand therapy script and a script for Voltaren gel. A hand therapy prescription was given. A Rx of Voltaren gel was given. Patient should return to the clinic in 6 weeks to follow up with  Kamini Grey PA-C, ATC if needed. The patient will call the office immediately with any problems. Orders Placed This Encounter   Medications    diclofenac sodium 1 % GEL     Sig: Apply 2 g topically 4 times daily     Dispense:  2 Tube     Refill:  1       Orders Placed This Encounter   Procedures   [facility]     Referral Priority:   Routine     Referral Type:   Eval and Treat     Referral Reason:   Specialty Services Required     Requested Specialty:   Physical Therapy     Number of Visits Requested:   [facility]     Referral Priority:   Routine     Referral Type:   Eval and Treat     Referral Reason:   Specialty Services Required     Requested Specialty:   Occupational Therapy     Number of Visits Requested:   1     Deepika DE JESUS am scribing for and in the presence of  Josie Simpson ATC. 12/19/2018  4:47 PM    IKamini PA-C, ATC ,  have personally seen this patient, reviewed the chart including history, and imaging if done. I personally  performed the physical exam and obtained any needed additional history. I placed orders, performed or supervised procedures and developed the treatment plan.     Electronically signed by Christopher Marks on 12/19/2018 at 4:47 PM

## 2019-01-04 ENCOUNTER — HOSPITAL ENCOUNTER (OUTPATIENT)
Dept: OCCUPATIONAL THERAPY | Age: 29
Setting detail: THERAPIES SERIES
Discharge: HOME | End: 2019-01-04
Payer: OTHER GOVERNMENT

## 2019-01-04 PROCEDURE — 97165 OT EVAL LOW COMPLEX 30 MIN: CPT

## 2019-01-21 ENCOUNTER — TELEPHONE (OUTPATIENT)
Dept: FAMILY MEDICINE CLINIC | Age: 29
End: 2019-01-21

## 2019-01-22 DIAGNOSIS — S62.616A CLOSED DISPLACED FRACTURE OF PROXIMAL PHALANX OF RIGHT LITTLE FINGER, INITIAL ENCOUNTER: Primary | ICD-10-CM

## 2019-01-25 ENCOUNTER — TELEPHONE (OUTPATIENT)
Dept: FAMILY MEDICINE CLINIC | Age: 29
End: 2019-01-25

## 2019-01-25 DIAGNOSIS — S62.616A CLOSED DISPLACED FRACTURE OF PROXIMAL PHALANX OF RIGHT LITTLE FINGER, INITIAL ENCOUNTER: Primary | ICD-10-CM

## 2019-01-30 DIAGNOSIS — S62.616A CLOSED DISPLACED FRACTURE OF PROXIMAL PHALANX OF RIGHT LITTLE FINGER, INITIAL ENCOUNTER: Primary | ICD-10-CM

## 2019-02-18 ENCOUNTER — TELEPHONE (OUTPATIENT)
Dept: FAMILY MEDICINE CLINIC | Age: 29
End: 2019-02-18

## 2019-02-27 ENCOUNTER — HOSPITAL ENCOUNTER (OUTPATIENT)
Age: 29
Setting detail: SPECIMEN
Discharge: HOME | End: 2019-02-27
Payer: OTHER GOVERNMENT

## 2019-02-27 ENCOUNTER — OFFICE VISIT (OUTPATIENT)
Dept: FAMILY MEDICINE CLINIC | Age: 29
End: 2019-02-27
Payer: OTHER GOVERNMENT

## 2019-02-27 VITALS
SYSTOLIC BLOOD PRESSURE: 109 MMHG | HEIGHT: 62 IN | BODY MASS INDEX: 25.58 KG/M2 | DIASTOLIC BLOOD PRESSURE: 74 MMHG | WEIGHT: 139 LBS | HEART RATE: 79 BPM | OXYGEN SATURATION: 97 %

## 2019-02-27 DIAGNOSIS — H04.123 DRY EYE SYNDROME OF BOTH EYES: ICD-10-CM

## 2019-02-27 DIAGNOSIS — S62.658D: ICD-10-CM

## 2019-02-27 DIAGNOSIS — Z00.00 ROUTINE GENERAL MEDICAL EXAMINATION AT A HEALTH CARE FACILITY: ICD-10-CM

## 2019-02-27 DIAGNOSIS — Z00.00 ROUTINE GENERAL MEDICAL EXAMINATION AT A HEALTH CARE FACILITY: Primary | ICD-10-CM

## 2019-02-27 PROBLEM — F17.200 SMOKING: Status: RESOLVED | Noted: 2018-02-14 | Resolved: 2019-02-27

## 2019-02-27 LAB
ABSOLUTE EOS #: 0.05 K/UL (ref 0–0.44)
ABSOLUTE IMMATURE GRANULOCYTE: <0.03 K/UL (ref 0–0.3)
ABSOLUTE LYMPH #: 1.73 K/UL (ref 1.1–3.7)
ABSOLUTE MONO #: 0.89 K/UL (ref 0.1–1.2)
ANION GAP SERPL CALCULATED.3IONS-SCNC: 17 MMOL/L (ref 9–17)
BASOPHILS # BLD: 0 % (ref 0–2)
BASOPHILS ABSOLUTE: 0.03 K/UL (ref 0–0.2)
BUN BLDV-MCNC: 10 MG/DL (ref 6–20)
BUN/CREAT BLD: ABNORMAL (ref 9–20)
CALCIUM SERPL-MCNC: 9.4 MG/DL (ref 8.6–10.4)
CHLORIDE BLD-SCNC: 108 MMOL/L (ref 98–107)
CHOLESTEROL/HDL RATIO: 3.1
CHOLESTEROL: 173 MG/DL
CO2: 19 MMOL/L (ref 20–31)
CREAT SERPL-MCNC: 0.73 MG/DL (ref 0.5–0.9)
DIFFERENTIAL TYPE: ABNORMAL
EOSINOPHILS RELATIVE PERCENT: 1 % (ref 1–4)
ESTIMATED AVERAGE GLUCOSE: 82 MG/DL
GFR AFRICAN AMERICAN: >60 ML/MIN
GFR NON-AFRICAN AMERICAN: >60 ML/MIN
GFR SERPL CREATININE-BSD FRML MDRD: ABNORMAL ML/MIN/{1.73_M2}
GFR SERPL CREATININE-BSD FRML MDRD: ABNORMAL ML/MIN/{1.73_M2}
GLUCOSE BLD-MCNC: 130 MG/DL (ref 70–99)
HBA1C MFR BLD: 4.5 % (ref 4–6)
HCT VFR BLD CALC: 41.6 % (ref 36.3–47.1)
HDLC SERPL-MCNC: 56 MG/DL
HEMOGLOBIN: 13.8 G/DL (ref 11.9–15.1)
HIV AG/AB: NONREACTIVE
IMMATURE GRANULOCYTES: 0 %
LDL CHOLESTEROL: 93 MG/DL (ref 0–130)
LYMPHOCYTES # BLD: 18 % (ref 24–43)
MCH RBC QN AUTO: 31.7 PG (ref 25.2–33.5)
MCHC RBC AUTO-ENTMCNC: 33.2 G/DL (ref 28.4–34.8)
MCV RBC AUTO: 95.6 FL (ref 82.6–102.9)
MONOCYTES # BLD: 9 % (ref 3–12)
NRBC AUTOMATED: 0 PER 100 WBC
PDW BLD-RTO: 12.1 % (ref 11.8–14.4)
PLATELET # BLD: 201 K/UL (ref 138–453)
PLATELET ESTIMATE: ABNORMAL
PMV BLD AUTO: 11.9 FL (ref 8.1–13.5)
POTASSIUM SERPL-SCNC: 5.1 MMOL/L (ref 3.7–5.3)
RBC # BLD: 4.35 M/UL (ref 3.95–5.11)
RBC # BLD: ABNORMAL 10*6/UL
SEG NEUTROPHILS: 72 % (ref 36–65)
SEGMENTED NEUTROPHILS ABSOLUTE COUNT: 6.87 K/UL (ref 1.5–8.1)
SODIUM BLD-SCNC: 144 MMOL/L (ref 135–144)
TRIGL SERPL-MCNC: 121 MG/DL
VLDLC SERPL CALC-MCNC: NORMAL MG/DL (ref 1–30)
WBC # BLD: 9.6 K/UL (ref 3.5–11.3)
WBC # BLD: ABNORMAL 10*3/UL

## 2019-02-27 PROCEDURE — 99395 PREV VISIT EST AGE 18-39: CPT | Performed by: FAMILY MEDICINE

## 2019-02-27 RX ORDER — CYCLOSPORINE 0.5 MG/ML
1 EMULSION OPHTHALMIC 2 TIMES DAILY
Qty: 1 BOTTLE | Refills: 0 | Status: SHIPPED | OUTPATIENT
Start: 2019-02-27 | End: 2019-12-18

## 2019-02-27 RX ORDER — CYCLOSPORINE 0.5 MG/ML
1 EMULSION OPHTHALMIC 2 TIMES DAILY
Qty: 1 BOTTLE | Refills: 0 | Status: SHIPPED | OUTPATIENT
Start: 2019-02-27 | End: 2019-02-27 | Stop reason: SDUPTHER

## 2019-02-27 ASSESSMENT — ENCOUNTER SYMPTOMS
EYE PAIN: 0
BLOOD IN STOOL: 0
PHOTOPHOBIA: 0
SHORTNESS OF BREATH: 0
EYE REDNESS: 0

## 2019-02-27 ASSESSMENT — PATIENT HEALTH QUESTIONNAIRE - PHQ9
2. FEELING DOWN, DEPRESSED OR HOPELESS: 0
1. LITTLE INTEREST OR PLEASURE IN DOING THINGS: 0
SUM OF ALL RESPONSES TO PHQ9 QUESTIONS 1 & 2: 0
SUM OF ALL RESPONSES TO PHQ QUESTIONS 1-9: 0
SUM OF ALL RESPONSES TO PHQ QUESTIONS 1-9: 0

## 2019-03-05 ENCOUNTER — TELEPHONE (OUTPATIENT)
Dept: FAMILY MEDICINE CLINIC | Age: 29
End: 2019-03-05

## 2019-03-06 DIAGNOSIS — S62.658D: Primary | ICD-10-CM

## 2019-03-13 ENCOUNTER — TELEPHONE (OUTPATIENT)
Dept: FAMILY MEDICINE CLINIC | Age: 29
End: 2019-03-13

## 2019-03-13 NOTE — TELEPHONE ENCOUNTER
Patient calling because she's need switch physical therapy order switched to occupational therapy patient states she will call back with fax number to occupational therapy

## 2019-03-22 ENCOUNTER — TELEPHONE (OUTPATIENT)
Dept: FAMILY MEDICINE CLINIC | Age: 29
End: 2019-03-22

## 2019-03-22 DIAGNOSIS — S62.658D: Primary | ICD-10-CM

## 2019-03-22 NOTE — TELEPHONE ENCOUNTER
patient is stating that her insurance is requesting a brand new referral be sent to Occupational Therapy for her to continue with her therapy that she was previously doing through her old PCP. She would like to go to [facility].   She is requesting an order from Dr Bony Pompa versus Dr Taryn Tierney because they are stating that she is not qualified to be a PCP per her insurance

## 2019-03-22 NOTE — TELEPHONE ENCOUNTER
Please see the referral from Dr. Connolly Pulling from 1/30, it is still open, she can go to [facility]. V's for the occupational therapy

## 2019-03-27 ENCOUNTER — HOSPITAL ENCOUNTER (OUTPATIENT)
Dept: OCCUPATIONAL THERAPY | Age: 29
Setting detail: THERAPIES SERIES
Discharge: HOME | End: 2019-03-27
Payer: OTHER GOVERNMENT

## 2019-03-27 PROCEDURE — 97110 THERAPEUTIC EXERCISES: CPT

## 2019-03-28 ENCOUNTER — APPOINTMENT (OUTPATIENT)
Dept: ULTRASOUND IMAGING | Age: 29
End: 2019-03-28
Payer: OTHER GOVERNMENT

## 2019-03-28 ENCOUNTER — HOSPITAL ENCOUNTER (EMERGENCY)
Age: 29
Discharge: HOME | End: 2019-03-28
Attending: EMERGENCY MEDICINE
Payer: OTHER GOVERNMENT

## 2019-03-28 VITALS
TEMPERATURE: 98.1 F | WEIGHT: 140.4 LBS | HEIGHT: 62 IN | HEART RATE: 83 BPM | SYSTOLIC BLOOD PRESSURE: 112 MMHG | RESPIRATION RATE: 16 BRPM | DIASTOLIC BLOOD PRESSURE: 73 MMHG | OXYGEN SATURATION: 99 % | BODY MASS INDEX: 25.83 KG/M2

## 2019-03-28 DIAGNOSIS — R10.2 PELVIC PAIN: Primary | ICD-10-CM

## 2019-03-28 LAB
-: ABNORMAL
AMORPHOUS: ABNORMAL
BACTERIA: ABNORMAL
BILIRUBIN URINE: NEGATIVE
CASTS UA: ABNORMAL /LPF
CHP ED QC CHECK: YES
COLOR: YELLOW
COMMENT UA: ABNORMAL
CRYSTALS, UA: ABNORMAL /HPF
EPITHELIAL CELLS UA: ABNORMAL /HPF (ref 0–5)
GLUCOSE URINE: NEGATIVE
KETONES, URINE: NEGATIVE
LEUKOCYTE ESTERASE, URINE: ABNORMAL
MUCUS: ABNORMAL
NITRITE, URINE: NEGATIVE
OTHER OBSERVATIONS UA: ABNORMAL
PH UA: 6.5 (ref 5–8)
PREGNANCY TEST URINE, POC: NEGATIVE
PROTEIN UA: NEGATIVE
RBC UA: ABNORMAL /HPF (ref 0–2)
RENAL EPITHELIAL, UA: ABNORMAL /HPF
SPECIFIC GRAVITY UA: 1.01 (ref 1–1.03)
TRICHOMONAS: ABNORMAL
TURBIDITY: ABNORMAL
URINE HGB: ABNORMAL
UROBILINOGEN, URINE: NORMAL
WBC UA: ABNORMAL /HPF (ref 0–5)
YEAST: ABNORMAL

## 2019-03-28 PROCEDURE — 76830 TRANSVAGINAL US NON-OB: CPT

## 2019-03-28 PROCEDURE — 87086 URINE CULTURE/COLONY COUNT: CPT

## 2019-03-28 PROCEDURE — 76856 US EXAM PELVIC COMPLETE: CPT

## 2019-03-28 PROCEDURE — 84703 CHORIONIC GONADOTROPIN ASSAY: CPT

## 2019-03-28 PROCEDURE — 6370000000 HC RX 637 (ALT 250 FOR IP): Performed by: NURSE PRACTITIONER

## 2019-03-28 PROCEDURE — 99284 EMERGENCY DEPT VISIT MOD MDM: CPT

## 2019-03-28 PROCEDURE — 93976 VASCULAR STUDY: CPT

## 2019-03-28 PROCEDURE — 81001 URINALYSIS AUTO W/SCOPE: CPT

## 2019-03-28 RX ORDER — IBUPROFEN 800 MG/1
800 TABLET ORAL ONCE
Status: COMPLETED | OUTPATIENT
Start: 2019-03-28 | End: 2019-03-28

## 2019-03-28 RX ORDER — IBUPROFEN 800 MG/1
800 TABLET ORAL EVERY 8 HOURS PRN
Qty: 30 TABLET | Refills: 0 | Status: SHIPPED | OUTPATIENT
Start: 2019-03-28 | End: 2019-07-03 | Stop reason: ALTCHOICE

## 2019-03-28 RX ADMIN — IBUPROFEN 800 MG: 800 TABLET, FILM COATED ORAL at 11:34

## 2019-03-28 ASSESSMENT — PAIN SCALES - GENERAL
PAINLEVEL_OUTOF10: 7
PAINLEVEL_OUTOF10: 6
PAINLEVEL_OUTOF10: 4

## 2019-03-28 ASSESSMENT — ENCOUNTER SYMPTOMS
ABDOMINAL PAIN: 0
VOMITING: 0
NAUSEA: 0
BACK PAIN: 0

## 2019-03-28 ASSESSMENT — PAIN SCALES - WONG BAKER: WONGBAKER_NUMERICALRESPONSE: 6

## 2019-03-28 ASSESSMENT — PAIN DESCRIPTION - LOCATION: LOCATION: PELVIS

## 2019-03-28 ASSESSMENT — PAIN DESCRIPTION - DESCRIPTORS: DESCRIPTORS: SHARP;DULL

## 2019-03-28 ASSESSMENT — PAIN DESCRIPTION - PROGRESSION: CLINICAL_PROGRESSION: GRADUALLY IMPROVING

## 2019-03-28 ASSESSMENT — PAIN DESCRIPTION - ORIENTATION: ORIENTATION: LEFT

## 2019-03-28 ASSESSMENT — PAIN DESCRIPTION - FREQUENCY: FREQUENCY: CONTINUOUS

## 2019-03-29 LAB
CULTURE: NORMAL
HCG, PREGNANCY URINE (POC): NEGATIVE
Lab: NORMAL
SPECIMEN DESCRIPTION: NORMAL

## 2019-04-01 ENCOUNTER — HOSPITAL ENCOUNTER (OUTPATIENT)
Dept: OCCUPATIONAL THERAPY | Age: 29
Setting detail: THERAPIES SERIES
Discharge: HOME | End: 2019-04-01
Payer: OTHER GOVERNMENT

## 2019-04-01 PROCEDURE — 97110 THERAPEUTIC EXERCISES: CPT

## 2019-04-03 ENCOUNTER — HOSPITAL ENCOUNTER (OUTPATIENT)
Dept: OCCUPATIONAL THERAPY | Age: 29
Setting detail: THERAPIES SERIES
Discharge: HOME | End: 2019-04-03
Payer: OTHER GOVERNMENT

## 2019-04-03 NOTE — SIGNIFICANT EVENT
? [facility]. Occupational Therapy       [address]       Phone: [phone]       Fax: [phone] [facility] Occupational  Therapy at [employer].  [place], [place]       Phone: [phone]       Fax: [phone]          Occupational Therapy Cancel/No Show note    Date: 4/3/2019  Patient: Pushpa Bain  : 1990  MRN: [id number]    Cancels/No Shows to date:     For today's appointment patient:  Cancelled    Reason given by patient:  Other   Dog got out, is out looking for dog      Electronically signed by: BIPIN Nichols/IZZY

## 2019-04-08 ENCOUNTER — HOSPITAL ENCOUNTER (OUTPATIENT)
Dept: OCCUPATIONAL THERAPY | Age: 29
Setting detail: THERAPIES SERIES
Discharge: HOME | End: 2019-04-08
Payer: OTHER GOVERNMENT

## 2019-04-08 PROCEDURE — 97110 THERAPEUTIC EXERCISES: CPT

## 2019-04-08 NOTE — FLOWSHEET NOTE
? [address] [facility] floor       [address] [facility]         Phone: [phone]       Fax: [phone] at [address] [place] [address] , [address] [place] [address]  Phone: [phone]  Fax: [phone]     Occupational Therapy Daily Treatment Note    Date:  2019  Patient Name:  Samira Cruz    :  1990  MRN: [id number]  Physician: [facility]  Insurance: [facility]  Medical Diagnosis: Closed displaced fracture of proximal phalanx of right little finger with routine healing, subsequent encounter (S62.616)          Rehab Codes: pain in finger M79.646, stiffness in joint M25.64, Edema R60.0 Weakness M62.81  Onset Date: 2018    Next Dr. Lazarus Ehrich: TBD  Visit# / total visits:   Cancels/No Shows: 0/0      Subjective:    Pain:  No Location:  N/A Pain Rating: (0-10 scale) 0/10  Pain altered Tx:  No  Action:  Comments:    Objective:  Modalities:   Exercises:    EXERCISE    REPS/     TIME  WEIGHT/    LEVEL COMMENTS   AROM   completed   PROM   completed   Pinch pins with foam block Red 2 pounds 1 series Completed   Wooden blocks with velcro 1 series  completed   theraputty orange  Increased weight from peach to orange for min resistance   Rubber band ball 5  Initiated this date   Hand exerciser 30 pounds 30 Initiated this date     Other: increased putty resistance this date for increased pt strength with  and small digit use. Specific Instructions for next treatment:    Treatment Charges: Mins Units   Therapeutic Exercise [id number] 2 co tx                           Assessment: Progressing Towards Goals      Short Term Goals: (  6    Treatments)  1. Decrease Pain: from 4/10 with simple adl tasks to 2/10 with simple adl tasks  2. Increase A/P ROM (degrees):  1. MCP flexion to 90   2. DIP flexion to 50  3. Increase strength (pounds):  strength to 35 for increased I with [address]  4.  Increase function:  DASH score will be 20% functionally impaired or less  5. Decrease Edema: to same size as L hand (a decrease by .5 mm)  6. Independent with Home Exercise Program in 3 sessions        Long Term Goals: (  12  Treatments)  1. Increase  strength to 50 pounds for increased I with all home tasks  2. Decrease pain to 0/10 with all home tasks           Pt. Education:  Yes  Method of Education: Verbal and Written  Comprehension of Education: Yes      Plan:  Continue with current plan.             Time In/Out: 0250-1509 co tx   Total Treatment Time:   47      Min      Electronically signed by:  BIPIN Ledesma/IZZY

## 2019-04-11 ENCOUNTER — HOSPITAL ENCOUNTER (OUTPATIENT)
Dept: OCCUPATIONAL THERAPY | Age: 29
Setting detail: THERAPIES SERIES
Discharge: HOME | End: 2019-04-11
Payer: OTHER GOVERNMENT

## 2019-04-11 PROCEDURE — 97110 THERAPEUTIC EXERCISES: CPT

## 2019-04-15 ENCOUNTER — HOSPITAL ENCOUNTER (OUTPATIENT)
Dept: OCCUPATIONAL THERAPY | Age: 29
Setting detail: THERAPIES SERIES
Discharge: HOME | End: 2019-04-15
Payer: OTHER GOVERNMENT

## 2019-04-15 PROCEDURE — 97140 MANUAL THERAPY 1/> REGIONS: CPT

## 2019-04-15 PROCEDURE — 97110 THERAPEUTIC EXERCISES: CPT

## 2019-04-15 NOTE — PROGRESS NOTES
activity    Pain Altered Tx: []Yes []No  Action Taken:    Objective:  Tests/Measurements:  Current Functional Level:  34% functionally impaired as measured with the DASH Functional Survey. 0-100 scale, with 0 = no Deficits   UEFI: 48/80      STRENGTH  1-4-19 EVAL NOTE      RIGHT LEFT    23 35   Lateral pinch 10 15   2 point pinch 10 8   3 jaw pinch 12 10     The affected extremity is 38 weaker than the unaffected extremity. (affected score/unaffected score, take the total and subtract from 100)    STRENGTH  4/15/19 PROGRESS NOTE      RIGHT LEFT    25 33   Lateral pinch 12 17   2 point pinch 9 10   3 jaw pinch 12 13     The affected extremity is _25%____ weaker than the unaffected extremity. (affected score/unaffected score, take the total and subtract from 100)    DIGITS  1-4-19 EVAL NOTE         Extension/Flexion   RING LITTLE   MCP +15\69 +15\65   PIP +5\100 0\90   DIP +5\30 0\18          Digits 4/15/19 PROGRESS NOTE         Extension/Flexion   RING LITTLE   MCP +15\70 0\73   PIP +5\95 0\94   DIP +5\66 inc 36 0\72 inc 64          Problems:     [x] Pain       [x] ROM      [x] Strength  [x] Function      [x] Edema     [x] Coordination    [x] Sensation     [x] Falls: History/Risk of        Short Term Goals: (  6    Treatments)  1. Decrease Pain: from 4/10 with simple adl tasks to 2/10 with simple adl tasks ONGOING  2. Increase A/P ROM (degrees):  1. MCP flexion to 90 ONGOING, IMPROVING  2. DIP flexion to 50  MET  3. Increase strength (pounds):  strength to 35 for increased I with [facility] ONGOING, IMPROVING  4. Increase function:  DASH score will be 20% functionally impaired or less TBD  5. Decrease Edema: to same size as L hand (a decrease by .5 mm) ONGOING  6. Independent with Home Exercise Program in 3 sessions MET    Long Term Goals: (  12  Treatments)  1.  Increase  strength to 50 pounds for increased I with all home tasks ONGOING  Decrease pain to 0/10 with all home tasks ONGOING    Patient Goals: less pain    Treatment Potential: [x]Good [x]Fair []Poor  Suggested Professional Referral: []Yes [x]No  Domestic Concerns: []Yes [x]No   Barriers to Goal Achievement: []Yes [x] No    Comments/Assessment: Pt presents s/p fall and fracture to proximal phalanx. Pt reports pain with MCP flexion/extension. 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 Pt reports pain intermittantly. Pt has made improvements in DIP flexion allowing for a tighter fist at this time, Strength is improving slowly. Home Program Initiated:   [ x ] Written    [  ] Verbal    [  ] Demo   Comprehension of Education: [] Yes [] No [] Needs Review  [x]Plans /[x] Goals, [x]Risks/ [x] Benefits discussed with [x] Patient []Family    Treatment Plan:  Frequency/Duration:    2-3   Times a week, for    12  Visits. [x] Therapeutic Exercise 49394 [] Electrical Stim [id number]    []Neuro Re-Ed  79539  [x] Written Home Program    [] Iontophoresis 48844  [x] Therapeutic Activities 98460  [x] Manual Therapy 73588  [] Manual Therapy 51952  [x] Ultrasound 23396   [] Hot Pack/Cold Pack 30976  [] Attended Electrical Stim [id number] [x] Massage 35362    [] Ortho Fit/Train [id number]  [] Ortho Re-Fit/Check  [id number]       Treatment This Date:  EXERCISE    REPS/     TIME  WEIGHT/    LEVEL COMMENTS   AROM   discontinued   PROM     completed   Pinch pins with foam block green 2 pounds 1 series Due to time constraint's, exercise not completed. Wooden blocks with velcro 1 series   completed   theraputty green   completed   Rubber band ball 5   completed   Hand exerciser 40 pounds 30 Completed increased resistance with good tolerance.    Flexbar   strength  Wrist flex/ext  Wrist pron/sup  Wrist ulnar/rad  Thumb abd/add red   completed      Total Treatment Time: 60 minutes    Time In: 1300    Time Out: 1400       Treatment Charges: Mins Units   Therapeutic Exercise 53639 52 3   Manual Therapy 33498   8 1

## 2019-04-15 NOTE — FLOWSHEET NOTE
? [address] [facility] floor       [address] [place], [place]         Phone: [phone]       Fax: [phone] [facility] at [address] [facility] , [address] [place] [address]  Phone: [phone]  Fax: [phone]     Occupational Therapy Daily Treatment Note    Date:  2019  Patient Name:  Lakshmi Martinez    :  1990  MRN: [id number]  Physician: Becky Nagy  Insurance: [facility]  Medical Diagnosis: Closed displaced fracture of proximal phalanx of right little finger with routine healing, subsequent encounter (S62.616)          Rehab Codes: pain in finger M79.646, stiffness in joint M25.64, Edema R60.0 Weakness M62.81  Onset Date: 2018    Next Dr. Lisset Jackson: TBD  Visit# / total visits: 3/12  Cancels/No Shows: 0/0      Subjective:    Pain:  No Location:  N/A Pain Rating: (0-10 scale) 0/10  Pain altered Tx:  No  Action:  Comments:    Objective:  Modalities:   Exercises:    EXERCISE    REPS/     TIME  WEIGHT/    LEVEL COMMENTS   AROM   completed   PROM   completed   Pinch pins with foam block Red 2 pounds 1 series Completed   Wooden blocks with velcro 1 series  completed   theraputty orange  Increased weight from peach to orange for min resistance                 Other: increased putty resistance this date for increased pt strength with  and small digit use. Specific Instructions for next treatment:    Treatment Charges: Mins Units   Therapeutic Exercise 19395 54 2 co tx                           Assessment: Progressing Towards Goals      Short Term Goals: (  6    Treatments)  1. Decrease Pain: from 4/10 with simple adl tasks to 2/10 with simple adl tasks  2. Increase A/P ROM (degrees):  1. MCP flexion to 90   2. DIP flexion to 50  3. Increase strength (pounds):  strength to 35 for increased I with [address]  4. Increase function:  DASH score will be 20% functionally impaired or less  5.  Decrease Edema: to same size as L hand (a decrease by .5 mm)  6. Independent with Home Exercise Program in 3 sessions        Long Term Goals: (  12  Treatments)  1. Increase  strength to 50 pounds for increased I with all home tasks  2. Decrease pain to 0/10 with all home tasks           Pt. Education:  Yes  Method of Education: Verbal and Written  Comprehension of Education: Yes      Plan:  Continue with current plan.             Time In/Out: 9660-3399 co tx   Total Treatment Time:   47      Min      Electronically signed by:  BIPIN Polo/IZZY SIUH

## 2019-04-18 ENCOUNTER — HOSPITAL ENCOUNTER (OUTPATIENT)
Dept: OCCUPATIONAL THERAPY | Age: 29
Setting detail: THERAPIES SERIES
Discharge: HOME | End: 2019-04-18
Payer: OTHER GOVERNMENT

## 2019-04-18 PROCEDURE — 97110 THERAPEUTIC EXERCISES: CPT

## 2019-04-18 NOTE — PROGRESS NOTES
activity    Pain Altered Tx: []Yes []No  Action Taken:    Objective:  Tests/Measurements:  Current Functional Level:  34% functionally impaired as measured with the DASH Functional Survey. 0-100 scale, with 0 = no Deficits   UEFI: 48/80      STRENGTH  1-4-19 EVAL NOTE      RIGHT LEFT    23 35   Lateral pinch 10 15   2 point pinch 10 8   3 jaw pinch 12 10     The affected extremity is 38 weaker than the unaffected extremity. (affected score/unaffected score, take the total and subtract from 100)    STRENGTH  4/15/19 PROGRESS NOTE      RIGHT LEFT    25 33   Lateral pinch 12 17   2 point pinch 9 10   3 jaw pinch 12 13     The affected extremity is _25%____ weaker than the unaffected extremity. (affected score/unaffected score, take the total and subtract from 100)    DIGITS  1-4-19 EVAL NOTE         Extension/Flexion   RING LITTLE   MCP +15\69 +15\65   PIP +5\100 0\90   DIP +5\30 0\18          Digits 4/15/19 PROGRESS NOTE         Extension/Flexion   RING LITTLE   MCP +15\70 0\73   PIP +5\95 0\94   DIP +5\66 inc 36 0\72 inc 64          Problems:     [x] Pain       [x] ROM      [x] Strength  [x] Function      [x] Edema     [x] Coordination    [x] Sensation     [x] Falls: History/Risk of        Short Term Goals: (  6    Treatments)  1. Decrease Pain: from 4/10 with simple adl tasks to 2/10 with simple adl tasks ONGOING  2. Increase A/P ROM (degrees):  1. MCP flexion to 90 ONGOING, IMPROVING  2. DIP flexion to 50  MET  3. Increase strength (pounds):  strength to 35 for increased I with [address] ONGOING, IMPROVING  4. Increase function:  DASH score will be 20% functionally impaired or less TBD  5. Decrease Edema: to same size as L hand (a decrease by .5 mm) ONGOING  6. Independent with Home Exercise Program in 3 sessions MET    Long Term Goals: (  12  Treatments)  1.  Increase  strength to 50 pounds for increased I with all home tasks ONGOING  Decrease pain to 0/10 with all home tasks ONGOING    Patient Goals: less pain    Treatment Potential: [x]Good [x]Fair []Poor  Suggested Professional Referral: []Yes [x]No  Domestic Concerns: []Yes [x]No   Barriers to Goal Achievement: []Yes [x] No    Comments/Assessment: Pt presents s/p fall and fracture to proximal phalanx. Pt reports pain with MCP flexion/extension. 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 Pt reports pain intermittantly. Pt has made improvements in DIP flexion allowing for a tighter fist at this time, Strength is improving slowly. Home Program Initiated:   [ x ] Written    [  ] Verbal    [  ] Demo   Comprehension of Education: [] Yes [] No [] Needs Review  [x]Plans /[x] Goals, [x]Risks/ [x] Benefits discussed with [x] Patient []Family    Treatment Plan:  Frequency/Duration:    2-3   Times a week, for    12  Visits. [x] Therapeutic Exercise 10746 [] Electrical Stim [id number]    []Neuro Re-Ed  87246  [x] Written Home Program    [] Iontophoresis 60404  [x] Therapeutic Activities 93902  [x] Manual Therapy 24766  [] Manual Therapy 99980  [x] Ultrasound 12657   [] Hot Pack/Cold Pack 67251  [] Attended Electrical Stim [id number] [x] Massage 38526    [] Ortho Fit/Train [id number]  [] Ortho Re-Fit/Check  [id number]       Treatment This Date:  EXERCISE    REPS/     TIME  WEIGHT/    LEVEL COMMENTS   AROM   discontinued   PROM     completed   Pinch pins with foam block green 2 pounds 1 series Due to time constraint's, exercise not completed. Wooden blocks with velcro 1 series   completed   theraputty Green to blue   Completed increased resistance with good tolerance    Rubber band ball 5   completed   Hand exerciser 30 40 pounds Completed.    Flexbar   strength  Wrist flex/ext  Wrist pron/sup  Wrist ulnar/rad  Thumb abd/add Red to green   Completed increased resistance with good tolerance      Total Treatment Time: 60 minutes    Time In: 1300    Time Out: 1400       Treatment Charges: Mins Units   Therapeutic Exercise 31805 52 3   Manual Therapy 96466   8 1                              Electronically signed by Valentine Treadwell on 4/18/2019 at 1:10 PM     Physician Signature: _________________________ Date: _______________  By signing above or cosigning this note, I have reviewed this plan of care and certify a need for medically necessary rehabilitation services.      *PLEASE SIGN ABOVE AND FAX BACK ALL PAGES*         Electronically signed by:   Valentine Treadwell  Date: 4/18/2019

## 2019-04-22 ENCOUNTER — HOSPITAL ENCOUNTER (OUTPATIENT)
Dept: OCCUPATIONAL THERAPY | Age: 29
Setting detail: THERAPIES SERIES
Discharge: HOME | End: 2019-04-22
Payer: OTHER GOVERNMENT

## 2019-04-22 PROCEDURE — 97110 THERAPEUTIC EXERCISES: CPT

## 2019-04-22 NOTE — FLOWSHEET NOTE
simple adl tasks  2. Increase A/P ROM (degrees):  1. MCP flexion to 90   2. DIP flexion to 50  3. Increase strength (pounds):  strength to 35 for increased I with [facility]  4. Increase function:  DASH score will be 20% functionally impaired or less  5. Decrease Edema: to same size as L hand (a decrease by .5 mm)  6. Independent with Home Exercise Program in 3 sessions        Long Term Goals: (  12  Treatments)  1. Increase  strength to 50 pounds for increased I with all home tasks  2. Decrease pain to 0/10 with all home tasks     Pt. Education:  Yes  Method of Education: Verbal and Written  Comprehension of Education: Yes      Plan:  Continue with current plan.           Time In/Out: 7970-5336   Total Treatment Time:   54  Min      Electronically signed by:  BRANDEN Rebollar

## 2019-04-26 ENCOUNTER — HOSPITAL ENCOUNTER (OUTPATIENT)
Dept: OCCUPATIONAL THERAPY | Age: 29
Setting detail: THERAPIES SERIES
Discharge: HOME | End: 2019-04-26
Payer: OTHER GOVERNMENT

## 2019-04-26 PROCEDURE — 97110 THERAPEUTIC EXERCISES: CPT

## 2019-04-29 ENCOUNTER — HOSPITAL ENCOUNTER (OUTPATIENT)
Dept: OCCUPATIONAL THERAPY | Age: 29
Setting detail: THERAPIES SERIES
Discharge: HOME | End: 2019-04-29
Payer: OTHER GOVERNMENT

## 2019-04-29 ENCOUNTER — OFFICE VISIT (OUTPATIENT)
Dept: OBGYN CLINIC | Age: 29
End: 2019-04-29
Payer: OTHER GOVERNMENT

## 2019-04-29 VITALS
BODY MASS INDEX: 25.76 KG/M2 | HEART RATE: 93 BPM | HEIGHT: 62 IN | SYSTOLIC BLOOD PRESSURE: 130 MMHG | WEIGHT: 140 LBS | DIASTOLIC BLOOD PRESSURE: 78 MMHG

## 2019-04-29 DIAGNOSIS — N94.6 DYSMENORRHEA: ICD-10-CM

## 2019-04-29 DIAGNOSIS — Z01.419 ENCOUNTER FOR WELL WOMAN EXAM WITH ROUTINE GYNECOLOGICAL EXAM: Primary | ICD-10-CM

## 2019-04-29 PROCEDURE — 99395 PREV VISIT EST AGE 18-39: CPT | Performed by: OBSTETRICS & GYNECOLOGY

## 2019-04-29 PROCEDURE — 97110 THERAPEUTIC EXERCISES: CPT

## 2019-04-29 ASSESSMENT — ENCOUNTER SYMPTOMS
ABDOMINAL PAIN: 0
COUGH: 0
SHORTNESS OF BREATH: 0
BACK PAIN: 0
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
## 2019-04-30 ENCOUNTER — OFFICE VISIT (OUTPATIENT)
Dept: FAMILY MEDICINE CLINIC | Age: 29
End: 2019-04-30
Payer: OTHER GOVERNMENT

## 2019-04-30 VITALS
BODY MASS INDEX: 26.35 KG/M2 | DIASTOLIC BLOOD PRESSURE: 82 MMHG | WEIGHT: 143.2 LBS | HEART RATE: 84 BPM | RESPIRATION RATE: 16 BRPM | OXYGEN SATURATION: 99 % | SYSTOLIC BLOOD PRESSURE: 122 MMHG | HEIGHT: 62 IN

## 2019-04-30 DIAGNOSIS — R61 HYPERHIDROSIS: Primary | ICD-10-CM

## 2019-04-30 PROCEDURE — 99213 OFFICE O/P EST LOW 20 MIN: CPT | Performed by: FAMILY MEDICINE

## 2019-04-30 ASSESSMENT — PATIENT HEALTH QUESTIONNAIRE - PHQ9
SUM OF ALL RESPONSES TO PHQ QUESTIONS 1-9: 0
SUM OF ALL RESPONSES TO PHQ9 QUESTIONS 1 & 2: 0
SUM OF ALL RESPONSES TO PHQ QUESTIONS 1-9: 0
1. LITTLE INTEREST OR PLEASURE IN DOING THINGS: 0
2. FEELING DOWN, DEPRESSED OR HOPELESS: 0

## 2019-04-30 NOTE — PROGRESS NOTES
[facility]  [address] [place] [id number]  Dept: [phone]      Amie Crump is a 30 y.o. female who presents today for follow up on her  medical conditions as noted below. Chief Complaint   Patient presents with    Other     pt says hands get sweaty. pt wants to get referral to see dermatologist. used carpe lotion and hasn't been working. Patient Active Problem List:     Anxiety and depression     PTSD (post-traumatic stress disorder)     History of low transverse  section     Past Medical History:   Diagnosis Date    Anxiety     Depression     HPV (human papilloma virus) anogenital infection     PTSD (post-traumatic stress disorder)       Past Surgical History:   Procedure Laterality Date     SECTION      TONSILLECTOMY      WISDOM TOOTH EXTRACTION       Family History   Problem Relation Age of Onset    Depression Mother     Physical Abuse Father     Coronary Art Dis Maternal Aunt         ovarian cancer       Current Outpatient Medications   Medication Sig Dispense Refill    aluminum chloride (DRYSOL) 20 % external solution Apply topically nightly. 60 mL 0    norelgestromin-ethinyl estradiol (ORTHO EVRA) 150-35 MCG/24HR Place 1 patch onto the skin every 7 days Pt to use continuously and not have a period. 4 patch 5    ibuprofen (ADVIL;MOTRIN) 800 MG tablet Take 1 tablet by mouth every 8 hours as needed for Pain 30 tablet 0    cycloSPORINE (RESTASIS) 0.05 % ophthalmic emulsion Place 1 drop into both eyes 2 times daily 1 Bottle 0    diclofenac sodium 1 % GEL Apply 2 g topically 4 times daily 2 Tube 1    ibuprofen (ADVIL;MOTRIN) 600 MG tablet Take 1 tablet by mouth every 6 hours as needed for Pain 30 tablet 0     No current facility-administered medications for this visit.       ALLERGIES:    Allergies   Allergen Reactions    Codeine     Tylenol [Acetaminophen]     Vicodin [Hydrocodone-Acetaminophen]        Social History     Tobacco Use    Smoking status: Former Smoker    Smokeless tobacco: Never Used   Substance Use Topics    Alcohol use: Yes     Comment: social         LDL Cholesterol (mg/dL)   Date Value   02/27/2019 93     HDL (mg/dL)   Date Value   02/27/2019 56     BUN (mg/dL)   Date Value   02/27/2019 10     CREATININE (mg/dL)   Date Value   02/27/2019 0.73     Glucose (mg/dL)   Date Value   02/27/2019 130 (H)     Hemoglobin A1C (%)   Date Value   02/27/2019 4.5              Subjective:      HPI  Is here today complaining of having ongoing problems with sweating of her hands is going on for years and years but it is getting to the point where she can barely function is very embarrassing. She wants referral to a dermatologist.  I offered to prescribe her Drysol and do some laboratory studies and she is refusing to do any of that. Review of Systems:     Constitutional: Negative for fever, appetite change and fatigue. Family social and medical history reviewed and unchanged     HENT: Negative. Negative for nosebleeds, trouble swallowing and neck pain. Eyes: Negative for photophobia and visual disturbance. Respiratory: Negative. Negative for chest tightness and shortness of breath. Cardiovascular: Negative. Negative for chest pain and leg swelling. Gastrointestinal: Negative. Negative for abdominal pain and blood in stool. Endocrine: Negative for cold intolerance and polyuria. Genitourinary: Negative for dysuria and hematuria. Musculoskeletal: Negative. Skin: Negative for rash. Allergic/Immunologic: Negative. Neurological: Negative. Negative for dizziness, weakness and numbness. Hematological: Negative. Negative for adenopathy. Does not bruise/bleed easily. Psychiatric/Behavioral: Negative for sleep disturbance, dysphoric mood and  decreased concentration. The patient is not nervous/anxious.         Objective:     Physical Exam: Referral Reason:   Specialty Services Required     Referred to Provider:   Vanessa Whaley MD     Requested Specialty:   Dermatology     Number of Visits Requested:   1     Orders Placed This Encounter   Medications    aluminum chloride (DRYSOL) 20 % external solution     Sig: Apply topically nightly. Dispense:  60 mL     Refill:  0     Patient was rather rude.   Stated she knows what she has she doesn't need labs she was not  going to try any prescription medications and just wants to see a dermatologist  Electronically signed by Radha Desai DO on 4/30/2019 at 9:00 AM

## 2019-05-02 ENCOUNTER — HOSPITAL ENCOUNTER (OUTPATIENT)
Dept: OCCUPATIONAL THERAPY | Age: 29
Setting detail: THERAPIES SERIES
Discharge: HOME | End: 2019-05-02
Payer: OTHER GOVERNMENT

## 2019-05-02 PROCEDURE — 97110 THERAPEUTIC EXERCISES: CPT

## 2019-05-02 NOTE — PROGRESS NOTES
[] [address] [facility] floor       [address], [place]         Phone: [phone]       Fax: [phone] [] [address] [facility] at [address] , [address]  Phone: [phone]  Fax: [phone]     Occupational Therapy Hand & Upper Extremity  Progress Note    Date: 2019      Patient: Kristie De La Cruz  : 1990  MRN: [id number]    [address] PA-C   Insurance: [employer]   Medical Diagnosis: Closed displaced fracture of proximal phalanx of right little finger with routine healing, subsequent encounter (S62.536)   Rehab Codes: pain in finger M79.646, stiffness in joint M25.64, Edema R60.0 Weakness M62.81  Onset Date: 2018    Next Dr. Yisel Adhikari:  TBD      Past Medical History: [ x ] Unremarkable  [  ] MI/Heart Problems  [  ] Refer to full medical chart in EPIC  [  ] Diabetes  [  ] Cancer  [  ] HTN  [  ] Arthritis  [  ] Pacemaker  [  ] Other:  Medications:  [ x ] Refer to full medical chart in [facility]  [  ] None  [  ] Other:  Allergies:  [ x ] Refer to full medical chart in [facility]  [  ] None  [  ] Other:        Mechanism of Injury: s/p fall from standing height         Precautions:   [x]None [] Fall Risk []WB Status [] Pacemaker []Other:            Involved Extremity:      [] Left [x] Right  Dominant: [] Left [x]Right  Previous Level of Function: I with all care  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    Pain Type: [] Constant [x] Intermittent   [  ] with pain meds at rest   [] With movement/Resistive activity [] With Sedentary activity    Pain Altered Tx: []Yes []No  Action Taken:    Objective:  Tests/Measurements:   Current Functional Level:  34% functionally impaired as measured with the DASH Functional Survey. 0-100 scale, with 0 = no Deficits   UEFI: 48/80  UEFI 5-2-19 61/80 a significant increase in function      STRENGTH  1-4-19 EVAL NOTE      RIGHT LEFT    23 35   Lateral pinch 10 15   2 point pinch 10 8   3 jaw pinch 12 10     The affected extremity is 38 weaker than the unaffected extremity. (affected score/unaffected score, take the total and subtract from 100)    STRENGTH  4/15/19 PROGRESS NOTE      RIGHT LEFT    25 33   Lateral pinch 12 17   2 point pinch 9 10   3 jaw pinch 12 13     The affected extremity is _25%____ weaker than the unaffected extremity. (affected score/unaffected score, take the total and subtract from 100)     STRENGTH  5/2/19 PROGRESS NOTE      RIGHT LEFT    25 40 inc 7   Lateral pinch 13 17   2 point pinch 12 12 inc 2   3 jaw pinch 12 13     The affected extremity is _38%____ weaker than the unaffected extremity. (affected score/unaffected score, take the total and subtract from 100)    DIGITS  1-4-19 EVAL NOTE         Extension/Flexion   RING LITTLE   MCP +15\69 +15\65   PIP +5\100 0\90   DIP +5\30 0\18          Digits 4/15/19 PROGRESS NOTE         Extension/Flexion   RING LITTLE   MCP +15\70 0\73   PIP +5\95 0\94   DIP +5\66 inc 36 0\72 inc 64          Digits 5/2/19 PROGRESS NOTE         Extension/Flexion   RING LITTLE   MCP +15\85 0\73   PIP +5\95 0\94   DIP +5\66 inc  0\84 inc 12            Problems:     [x] Pain       [x] ROM      [x] Strength  [x] Function      [x] Edema     [x] Coordination    [x] Sensation     [x] Falls: History/Risk of        Short Term Goals: (  6    Treatments)  1. Decrease Pain: from 4/10 with simple adl tasks to 2/10 with simple adl tasks ONGOING  2. Increase A/P ROM (degrees):  1. MCP flexion to 90 ONGOING, IMPROVING  2. DIP flexion to 50  MET  3.  Increase Completed. Wooden blocks with velcro 1 series   Completed. Demo'd  pull tech incorporating use of small digit. theraputty 10 Plum  Completed. Rubber band ball 15   Completed with weighted rubber bands ranging from 5-20lb's. Hand exerciser 30 45 pounds Completed. Flexbar   strength  Wrist flex/ext  Wrist pron/sup  Wrist ulnar/rad  Thumb abd/add       10 Blue (25 pounds) Completed     Total Treatment Time: 60 minutes    Time In: 1300    Time Out: 1400       Treatment Charges: Mins Units   Therapeutic Exercise 91633 52 3   Manual Therapy 34430   8 1                              Electronically signed by LORRAINE Lucio on 5/2/2019 at 1:11 PM     Physician Signature: _________________________ Date: _______________  By signing above or cosigning this note, I have reviewed this plan of care and certify a need for medically necessary rehabilitation services.      *PLEASE SIGN ABOVE AND FAX BACK ALL PAGES*         Electronically signed by:   LORRAINE Lucio  Date: 5/2/2019

## 2019-05-07 ENCOUNTER — HOSPITAL ENCOUNTER (OUTPATIENT)
Dept: OCCUPATIONAL THERAPY | Age: 29
Setting detail: THERAPIES SERIES
Discharge: HOME | End: 2019-05-07
Payer: OTHER GOVERNMENT

## 2019-05-07 PROCEDURE — 97110 THERAPEUTIC EXERCISES: CPT

## 2019-05-07 NOTE — FLOWSHEET NOTE
? [address] [facility] floor       [address], [place]         Phone: [phone]       Fax: [phone] [facility] at [address] [facility] , [address] [facility]  Phone: [phone]  Fax: [phone]     Occupational Therapy Daily Treatment Note    Date:  2019  Patient Name:  Rina Metcalf    :  1990  MRN: [id number]  Physician: Aida Guerrero  Insurance:   Medical Diagnosis: Closed displaced fracture of proximal phalanx of right little finger with routine healing, subsequent encounter (S62.616)          Rehab Codes: pain in finger M79.646, stiffness in joint M25.64, Edema R60.0 Weakness M62.81  Onset Date: 2018    Next Dr. Lynn Liao: TBD  Visit# / total visits:   Cancels/No Shows: 0/0    Subjective: Increased soreness after last treatment. After rest for two day's decreased soreness. Pain:  Yes Location: D5 metacarpal distal to base of D5 digit. Pain Rating: (0-10 scale) 0/10  Pain altered Tx:  No  Action:  Comments:     Objective:  Modalities:   Exercises:  EXERCISE    REPS/     TIME  WEIGHT/    LEVEL COMMENTS   AROM     Discontinued   PROM     Discontinued   Pinch pins with foam block 1 Series Green 4lbs Completed. Wooden blocks with velcro 1 series   Completed. Demo'd  pull tech incorporating use of small digit. theraputty 10 Plum  Completed. Rubber band ball 15   Completed with weighted rubber bands ranging from 5-20lb's. Hand exerciser 30 45 pounds Completed. Flexbar   strength  Wrist flex/ext  Wrist pron/sup  Wrist ulnar/rad  Thumb abd/add       10 Blue (25 pounds) Completed        Other: 6 additional visits approved by Porsche Reeder on 19. Pt continued to demo good tolerance with the increased theraputty and flexbar resistance this date. Pt reports no increase in pain following exercises this date.      Specific Instructions for next treatment:    Treatment Charges: Mins Units   Therapeutic Exercise 49413 55 4                           Assessment: Progressing Towards Goals      Short Term Goals: (  6    Treatments)  1. Decrease Pain: from 4/10 with simple adl tasks to 2/10 with simple adl tasks  2. Increase A/P ROM (degrees):  1. MCP flexion to 90   2. DIP flexion to 50  3. Increase strength (pounds):  strength to 35 for increased I with [address]  4. Increase function:  DASH score will be 20% functionally impaired or less  5. Decrease Edema: to same size as L hand (a decrease by .5 mm)  6. Independent with Home Exercise Program in 3 sessions        Long Term Goals: (  12  Treatments)  1. Increase  strength to 50 pounds for increased I with all home tasks  2. Decrease pain to 0/10 with all home tasks     Pt. Education:  Yes  Method of Education: Verbal  Comprehension of Education: Yes      Plan:  Continue with current plan.           Time In/Out: 9579-8834   Total Treatment Time:   54  Min      Electronically signed by:  Norman Toro OTCARRIE/EMILEE MAGANA

## 2019-05-09 ENCOUNTER — HOSPITAL ENCOUNTER (OUTPATIENT)
Dept: OCCUPATIONAL THERAPY | Age: 29
Setting detail: THERAPIES SERIES
Discharge: HOME | End: 2019-05-09
Payer: OTHER GOVERNMENT

## 2019-05-09 NOTE — SIGNIFICANT EVENT
? [address] [facility]. Occupational Therapy       [address], [address]       Phone: [phone]       Fax: [phone] [facility] Occupational  Therapy at [address] [place] [address].  [place], [place]       Phone: [phone]       Fax: [phone]          Occupational Therapy Cancel/No Show note    Date: 2019  Patient: Lakshmi Martinez  : 1990  MRN: [id number]    Cancels/No Shows to date:     For today's appointment patient:  Cancelled    Reason given by patient:  Other   arm hurts      Electronically signed by: BIPIN Polo/IZZY

## 2019-05-13 ENCOUNTER — HOSPITAL ENCOUNTER (OUTPATIENT)
Dept: OCCUPATIONAL THERAPY | Age: 29
Setting detail: THERAPIES SERIES
Discharge: HOME | End: 2019-05-13
Payer: OTHER GOVERNMENT

## 2019-05-13 PROCEDURE — 97110 THERAPEUTIC EXERCISES: CPT

## 2019-05-13 NOTE — FLOWSHEET NOTE
? [address] [facility] floor       [address], [place]         Phone: [phone]       Fax: [phone] [facility] at [address] [facility] , [address] [place] [address]  Phone: [phone]  Fax: [phone]     Occupational Therapy Daily Treatment Note    Date:  2019  Patient Name:  Choco Yun    :  1990  MRN: [id number]  Physician: Lorna Marvin  Insurance:   Medical Diagnosis: Closed displaced fracture of proximal phalanx of right little finger with routine healing, subsequent encounter (S62.616)          Rehab Codes: pain in finger M79.646, stiffness in joint M25.64, Edema R60.0 Weakness M62.81  Onset Date: 2018    Next Dr. Celia Choudhary: TBD  Visit# / total visits:   Cancels/No Shows: 0/0    Subjective: Increased soreness after last treatment. After rest for two day's decreased soreness. Pain:  Yes Location: D5 metacarpal distal to base of D5 digit. Pain Rating: (0-10 scale) 4/10  Pain altered Tx:  No  Action:  Comments:     Objective:  Modalities:   Exercises:  EXERCISE    REPS/     TIME  WEIGHT/    LEVEL COMMENTS   AROM     Discontinued   PROM     Discontinued   Pinch pins with foam block 1 Series Blue 6lbs Increased, Completed. Wooden blocks with velcro 1 series   Completed. Discontinued. theraputty 10 Plum  Completed. Rubber band ball 15   Completed with weighted rubber bands ranging from 5-20lb's. Hand exerciser 30 45 pounds Completed. Flexbar   strength  Wrist flex/ext  Wrist pron/sup  Wrist ulnar/rad  Thumb abd/add       10 Blue (25 pounds) Completed        Other: Increased pinch pin to 6lbs (Blue), needed 2 rest breaks while completing. Pt presented with kinesio tape that was placed on R fifth digit, pt states completing research on kinesio tape and where to place the tape. Pt reports no increase in pain following exercises this date.      Specific Instructions for next treatment:    Treatment Charges: Mins Units   Therapeutic Exercise 17944 55 4                           Assessment: Progressing Towards Goals      Short Term Goals: (  6    Treatments)  1. Decrease Pain: from 4/10 with simple adl tasks to 2/10 with simple adl tasks  2. Increase A/P ROM (degrees):  1. MCP flexion to 90   2. DIP flexion to 50  3. Increase strength (pounds):  strength to 35 for increased I with [facility]  4. Increase function:  DASH score will be 20% functionally impaired or less  5. Decrease Edema: to same size as L hand (a decrease by .5 mm)  6. Independent with Home Exercise Program in 3 sessions        Long Term Goals: (  12  Treatments)  1. Increase  strength to 50 pounds for increased I with all home tasks  2. Decrease pain to 0/10 with all home tasks     Pt. Education:  Yes  Method of Education: Verbal  Comprehension of Education: Yes      Plan:  Continue with current plan.           Time In/Out: 4248-6340   Total Treatment Time:   54  Min      Electronically signed by:  BIPIN Arevalo/IZZY, MOT

## 2019-05-17 ENCOUNTER — HOSPITAL ENCOUNTER (OUTPATIENT)
Dept: OCCUPATIONAL THERAPY | Age: 29
Setting detail: THERAPIES SERIES
Discharge: HOME | End: 2019-05-17
Payer: OTHER GOVERNMENT

## 2019-05-17 PROCEDURE — 97110 THERAPEUTIC EXERCISES: CPT

## 2019-05-17 NOTE — FLOWSHEET NOTE
? [address] [facility] [place] floor       [address], [place]         Phone: [phone]       Fax: [phone] [facility] at [address] [facility] , [address] [facility]  Phone: [phone]  Fax: [phone]     Occupational Therapy Daily Treatment Note    Date:  2019  Patient Name:  Pam Casarez    :  1990  MRN: [id number]  Physician: Ramsey Dela Cruz  Insurance: [facility]  Medical Diagnosis: Closed displaced fracture of proximal phalanx of right little finger with routine healing, subsequent encounter (S62.616)          Rehab Codes: pain in finger M79.646, stiffness in joint M25.64, Edema R60.0 Weakness M62.81  Onset Date: 2018    Next Dr. Dawson Pascual: TBD  Visit# / total visits: 15 /18  Cancels/No Shows: 0/0    Subjective: Increased soreness after last treatment. After rest for two day's decreased soreness. Pain:  Yes Location: D5 metacarpal distal to base of D5 digit. Pain Rating: (0-10 scale) 4/10  Pain altered Tx:  No  Action:  Comments:     Objective:  Modalities:   Exercises:  EXERCISE    REPS/     TIME  WEIGHT/    LEVEL COMMENTS   AROM     Discontinued   PROM     Discontinued   Pinch pins with foam block 1 Series Blue 6lbs  Completed. Wooden blocks with velcro 1 series   Completed. Discontinued. theraputty 10 Plum  Completed. Rubber band ball 15   Completed with weighted rubber bands ranging from 5-20lb's. Hand exerciser 30 45 pounds Completed. Flexbar   strength  Wrist flex/ext  Wrist pron/sup  Wrist ulnar/rad  Thumb abd/add       10 Blue (25 pounds) Completed   Digiflex Green 5 pounds 20 Initiated this date   Pickle grabber and small pegs  2 rows  Initiated this date        Other: Increased pinch pin to 6lbs (Blue), needed 2 rest breaks while completing. Pt reports no increase in pain following exercises this date.      Specific Instructions for next treatment:    Treatment Charges: Mins Units

## 2019-05-21 ENCOUNTER — HOSPITAL ENCOUNTER (OUTPATIENT)
Dept: OCCUPATIONAL THERAPY | Age: 29
Setting detail: THERAPIES SERIES
Discharge: HOME | End: 2019-05-21
Payer: OTHER GOVERNMENT

## 2019-05-21 PROCEDURE — 97110 THERAPEUTIC EXERCISES: CPT

## 2019-05-21 NOTE — FLOWSHEET NOTE
? [address] [facility] floor       [address] [facility]         Phone: [phone]       Fax: [phone] [facility] at [address] [facility] , [address] [place] [address]  Phone: [phone]  Fax: [phone]     Occupational Therapy Daily Treatment Note    Date:  2019  Patient Name:  Lennox Stacy    :  1990  MRN: [id number]  Physician: Liz Jensen  Insurance:   Medical Diagnosis: Closed displaced fracture of proximal phalanx of right little finger with routine healing, subsequent encounter (S62.616)          Rehab Codes: pain in finger M79.646, stiffness in joint M25.64, Edema R60.0 Weakness M62.81  Onset Date: 2018    Next Dr. Nelli Chacon: TBD  Visit# / total visits:   Cancels/No Shows: 0/0    Subjective: Increased soreness after last treatment. After rest for two day's decreased soreness. Pain:  Yes Location: D5 metacarpal distal to base of D5 digit. Pain Rating: (0-10 scale) 4/10  Pain altered Tx:  No  Action:  Comments:     Objective:  Modalities:   Exercises:  EXERCISE    REPS/     TIME  WEIGHT/    LEVEL COMMENTS   AROM     Discontinued   PROM     Discontinued   Pinch pins with foam block 1 Series Blue 6lbs  Completed. Wooden blocks with velcro 1 series   Completed. Discontinued. theraputty 10 Plum  Completed. Rubber band ball 15   Completed with weighted rubber bands ranging from 5-20lb's. Hand exerciser 30 45 pounds Completed. Flexbar   strength  Wrist flex/ext  Wrist pron/sup  Wrist ulnar/rad  Thumb abd/add       10 Blue (25 pounds) Completed   Digiflex Green 5 pounds 20 completed   Pickle grabber and small pegs  2 rows  completed        Other:   Pt reports no increase in pain following exercises this date. Pt reports difficulty with digiflex this date.      Specific Instructions for next treatment:    Treatment Charges: Mins Units   Therapeutic Exercise 12864 54 co treatment 2 Assessment: Progressing Towards Goals      Short Term Goals: (  6    Treatments)  1. Decrease Pain: from 4/10 with simple adl tasks to 2/10 with simple adl tasks  2. Increase A/P ROM (degrees):  1. MCP flexion to 90   2. DIP flexion to 50  3. Increase strength (pounds):  strength to 35 for increased I with [facility]  4. Increase function:  DASH score will be 20% functionally impaired or less  5. Decrease Edema: to same size as L hand (a decrease by .5 mm)  6. Independent with Home Exercise Program in 3 sessions        Long Term Goals: (  12  Treatments)  1. Increase  strength to 50 pounds for increased I with all home tasks  2. Decrease pain to 0/10 with all home tasks     Pt. Education:  Yes  Method of Education: Verbal  Comprehension of Education: Yes      Plan:  Continue with current plan.           Time In/Out: 4712-8990  Total Treatment Time:   47  Min      Electronically signed by: APPLE Mena

## 2019-05-23 ENCOUNTER — HOSPITAL ENCOUNTER (OUTPATIENT)
Dept: OCCUPATIONAL THERAPY | Age: 29
Setting detail: THERAPIES SERIES
Discharge: HOME | End: 2019-05-23
Payer: OTHER GOVERNMENT

## 2019-05-23 PROCEDURE — 97110 THERAPEUTIC EXERCISES: CPT

## 2019-05-23 NOTE — FLOWSHEET NOTE
? [address] [facility] floor       [address], [place]         Phone: [phone]       Fax: [phone] [facility] at [address] [facility] , [address] [facility]  Phone: [phone]  Fax: [phone]     Occupational Therapy Daily Treatment Note    Date:  2019  Patient Name:  Anne Ventura    :  1990  MRN: [id number]  Physician: Sue Dyson  Insurance: [facility]  Medical Diagnosis: Closed displaced fracture of proximal phalanx of right little finger with routine healing, subsequent encounter (S62.616)          Rehab Codes: pain in finger M79.646, stiffness in joint M25.64, Edema R60.0 Weakness M62.81  Onset Date: 2018    Next Dr. Lyon Basket: TBD  Visit# / total visits:   Cancels/No Shows: 0/0    Subjective: Increased soreness after last treatment. After rest for two day's decreased soreness. Pain:  Yes Location: D5 metacarpal distal to base of D5 digit. Pain Rating: (0-10 scale) 4/10  Pain altered Tx:  No  Action:  Comments:     Objective:  Modalities:   Exercises:  EXERCISE    REPS/     TIME  WEIGHT/    LEVEL COMMENTS   AROM     Discontinued   PROM     Discontinued   Pinch pins with foam block 1 Series Blue 6lbs  Completed. Wooden blocks with velcro 1 series   Completed. Discontinued. theraputty 10 Plum  Completed. Rubber band ball 15   Completed with weighted rubber bands ranging from 5-20lb's. Hand exerciser 30 45 pounds Completed. Flexbar   strength  Wrist flex/ext  Wrist pron/sup  Wrist ulnar/rad  Thumb abd/add       10 Blue (25 pounds) Completed   Digiflex Green 5 pounds 20 completed   Pickle grabber and small pegs  2 rows  completed        Other:   Pt reports no increase in pain following exercises this date. Pt reports plum putty is getting easier and wishes there was a stronger resistance to strengthen digit.    Specific Instructions for next treatment:    Treatment Charges: Mins Units   Therapeutic Exercise 72476 54 co treatment 2                           Assessment: Progressing Towards Goals      Short Term Goals: (  6    Treatments)  1. Decrease Pain: from 4/10 with simple adl tasks to 2/10 with simple adl tasks  2. Increase A/P ROM (degrees):  1. MCP flexion to 90   2. DIP flexion to 50  3. Increase strength (pounds):  strength to 35 for increased I with [address]  4. Increase function:  DASH score will be 20% functionally impaired or less  5. Decrease Edema: to same size as L hand (a decrease by .5 mm)  6. Independent with Home Exercise Program in 3 sessions        Long Term Goals: (  12  Treatments)  1. Increase  strength to 50 pounds for increased I with all home tasks  2. Decrease pain to 0/10 with all home tasks     Pt. Education:  Yes  Method of Education: Verbal  Comprehension of Education: Yes      Plan:  Continue with current plan.           Time In/Out: 6315-7661  Total Treatment Time:   47  Min      Electronically signed by: APPLE Martinez Junior

## 2019-05-28 ENCOUNTER — HOSPITAL ENCOUNTER (OUTPATIENT)
Dept: OCCUPATIONAL THERAPY | Age: 29
Setting detail: THERAPIES SERIES
Discharge: HOME | End: 2019-05-28
Payer: OTHER GOVERNMENT

## 2019-05-28 PROCEDURE — 97110 THERAPEUTIC EXERCISES: CPT

## 2019-05-28 NOTE — DISCHARGE SUMMARY
[x] [address] [facility] floor       [address], [place]         Phone: [phone]       Fax: [phone] [] [address] at [facility] [address] [place] [address] , [address] [place] [address] Road  Phone: [phone]  Fax: [phone]     Occupational Therapy Hand & Upper Extremity  Discharge Note    Date: 2019      Patient: Aaron Liz  : 1990  MRN: [id number]    Jillsgkris 7 PA-C   Insurance: [employer]     Medical Diagnosis: Closed displaced fracture of proximal phalanx of right little finger with routine healing, subsequent encounter (S62.496)   Rehab Codes: Pain in finger M79.646, stiffness in joint M25.64, Edema R60.0 Weakness M62.81.     Onset Date: 2018    Next Dr. Javy Moore:  TBD  Total visits attended: 15  Cancels/No shows: 20  Date of initial visit: 19                  Date of final visit: 19  Visit# / Total Visits:                    Cancels/No Shows: 2/0      Past Medical History: [ x ] Unremarkable  [  ] MI/Heart Problems  [  ] Refer to full medical chart in EPIC  [  ] Diabetes  [  ] Cancer  [  ] HTN  [  ] Arthritis  [  ] Pacemaker  [  ] Other:  Medications:  [ x ] Refer to full medical chart in Psychiatric  [  ] None  [  ] Other:  Allergies:  [ x ] Refer to full medical chart in Psychiatric  [  ] None  [  ] Other:        Mechanism of Injury: s/p fall from standing height         Precautions:   [x]None [] Fall Risk []WB Status [] Pacemaker []Other:            Involved Extremity:      [] Left [x] Right  Dominant: [] Left [x]Right  Previous Level of Function: I with all care  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, and watches nieces and nephews  Work Status: [] Normal [] Restricted [] Off D/T Injury/Condition [] Retired [] Unemployed [] Disabled []Other:  Orthosis:  NA   [] Currently has [] To be custom fabricated this date []Planned for subsequent visit  Type:    Subjective:  Chief Complaint:  Beau Carey achy pain  Pain: Intensity: 4-5/10    Location: Throbbing at base of little finger - unchanged  Pain Type: [] Constant [x] Intermittent   [  ] with pain meds at rest   [] With movement/Resistive activity [] With Sedentary activity    Pain Altered Tx: []Yes [x]No  Action Taken: NA    Objective:  Tests/Measurements:  Current Functional Level:  Functionally impaired as measured with the UEFI: 62/80  Initial Functional Level:  Functionally impaired as measured with the UEFI: 48/80  A variance of 9 points increased/decreased is considered significant. STRENGTH    Current  05/28/19    RIGHT LEFT    36.3  incr 11.3 46   Lateral pinch 15     incr 3 18   2 point pinch 10     incr 1 13   3 jaw pinch 13     incr 1 13   The affected extremity is 21.8% weaker than the unaffected extremity. (affected score/unaffected score, take the total and subtract from 100)    STRENGTH    Comparison, Previous 4/15/19     RIGHT LEFT    25 33   Lateral pinch 12 17   2 point pinch 9 10   3 jaw pinch 12 13   The affected extremity is 25% weaker than the unaffected extremity. (affected score/unaffected score, take the total and subtract from 100)     STRENGTH    Comparison, Initial 1-4-19     RIGHT LEFT    23 35   Lateral pinch 10 15   2 point pinch 10 8   3 jaw pinch 12 10   The affected extremity is 38% weaker than the unaffected extremity.   (affected score/unaffected score, take the total and subtract from 100)        DIGITS  Current, 05/28/19            Extension/Flexion   RING LITTLE   MCP WNL +15\85   WNL   PIP WNL     0\95   WNL   DIP WNL     0\85   WNL     Full fist/full extension     DIGITS   Comparison, Previous 4/15/19            Extension/Flexion   RING LITTLE   MCP +15\70 0\73   PIP +5\95 0\94   DIP +5\66 inc 36 0\72 inc 64          DIGITS    Comparison, Initial 1-4-19            Extension/Flexion   RING LITTLE   MCP +15\69 +15\65   PIP +5\100 0\90   DIP +5\30 0\18            Edema:  Circumfirential measurements of strength  Wrist flex/ext  Wrist pron/sup  Wrist ulnar/rad  Thumb abd/add       10 Blue   (25 pounds) Not Completed   Digiflex 20 reps 5 pounds  Chris Rodriges) Not Completed   Pickle grabber and small pegs  2 rows - in/out (20 pegs)   Completed        Treatment Charges: Mins Units   Therapeutic Exercise 90084 57 4   Manual Therapy 29216   0 0                                   Treatment to Date:  [x] Therapeutic Exercise    [] Modalities:  [] Therapeutic Activity     [] Ultrasound  [] Electrical Stimulation  [] Ortho refit/check             [] Massage   [] Fluidotherapy  [] Neuromuscular Re-education [] Cold/hotpack [] Iontophoresis: 4 mg/mL  [] Instruction in Home Exercise Program                     Dexamethasone Sodium  [x] Manual Therapy             Phosphate 40-80 mAmin          [] Paraffin        [] Other:    Discharge Status:     [] Pt recovered from conditions. Treatment goals were met. [x] Pt received maximum benefit. No further therapy indicated at this time. [x] Pt to continue exercise/home instructions independently. [] Therapy interrupted due to:    [] Pt has 2 or more no shows/cancels, is discontinued per our policy. [] Pt has completed prescribed number of treatment sessions. [] Other: Total Treatment Time:  62  Min. Time In/Time Out: 1103 - 1200       Electronically signed by BIPIN Hay/L, CHT on 5/28/2019 at 11:03 AM     If you have any questions or concerns, please don't hesitate to call.   Thank you for your referral.

## 2019-05-30 ENCOUNTER — HOSPITAL ENCOUNTER (OUTPATIENT)
Dept: OCCUPATIONAL THERAPY | Age: 29
Setting detail: THERAPIES SERIES
End: 2019-05-30
Payer: OTHER GOVERNMENT

## 2019-05-31 ENCOUNTER — APPOINTMENT (OUTPATIENT)
Dept: OCCUPATIONAL THERAPY | Age: 29
End: 2019-05-31
Payer: OTHER GOVERNMENT

## 2019-07-03 ENCOUNTER — TELEPHONE (OUTPATIENT)
Dept: DERMATOLOGY | Age: 29
End: 2019-07-03

## 2019-07-03 ENCOUNTER — OFFICE VISIT (OUTPATIENT)
Dept: DERMATOLOGY | Age: 29
End: 2019-07-03
Payer: OTHER GOVERNMENT

## 2019-07-03 VITALS
DIASTOLIC BLOOD PRESSURE: 71 MMHG | WEIGHT: 139 LBS | HEIGHT: 62 IN | SYSTOLIC BLOOD PRESSURE: 97 MMHG | BODY MASS INDEX: 25.58 KG/M2 | OXYGEN SATURATION: 98 % | HEART RATE: 79 BPM

## 2019-07-03 DIAGNOSIS — R61 HYPERHIDROSIS: Primary | ICD-10-CM

## 2019-07-03 PROCEDURE — 99202 OFFICE O/P NEW SF 15 MIN: CPT | Performed by: DERMATOLOGY

## 2019-07-03 NOTE — PATIENT INSTRUCTIONS
1. Seek insurance approval for iontophoresis therapy  2. [email or web address]  3.  Contact us to advise if insurance will approve, we can send any medical necessity notes required

## 2019-07-03 NOTE — TELEPHONE ENCOUNTER
Please PA [id number] Iontophoresis Device Package    Procedure Code (also referred to as HCPCS code):   Abilio [employer] Insurance Tax ID Number): [id number]  Abilio Khan NPI # [facility] Provider Identifier): [id number]  Diagnosis Codes (ICD-10 codes):  o Primary focal hyperhidrosis, palms: L74.512  o Primary focal hyperhidrosis, soles: L74.513    Thanks,    Christiano Wheeler

## 2019-08-30 ENCOUNTER — TELEPHONE (OUTPATIENT)
Dept: DERMATOLOGY | Age: 29
End: 2019-08-30

## 2019-08-30 NOTE — TELEPHONE ENCOUNTER
She is right about her options - there is another iontophoresis company that is newer (I don't have experience with them) web site below and there device is less expensive - give her the website and see if that one would be affordable,    Thanks,    Augie Scheuermann A Mutgi      [email or web address] [email or web address]

## 2019-10-29 ENCOUNTER — OFFICE VISIT (OUTPATIENT)
Dept: OBGYN CLINIC | Age: 29
End: 2019-10-29
Payer: OTHER GOVERNMENT

## 2019-10-29 ENCOUNTER — TELEPHONE (OUTPATIENT)
Dept: FAMILY MEDICINE CLINIC | Age: 29
End: 2019-10-29

## 2019-10-29 ENCOUNTER — TELEPHONE (OUTPATIENT)
Dept: OBGYN CLINIC | Age: 29
End: 2019-10-29

## 2019-10-29 VITALS
HEART RATE: 89 BPM | BODY MASS INDEX: 25.95 KG/M2 | WEIGHT: 141 LBS | DIASTOLIC BLOOD PRESSURE: 73 MMHG | HEIGHT: 62 IN | SYSTOLIC BLOOD PRESSURE: 102 MMHG

## 2019-10-29 DIAGNOSIS — N92.1 BREAKTHROUGH BLEEDING ON CONTRACEPTIVE PATCH: Primary | ICD-10-CM

## 2019-10-29 DIAGNOSIS — N94.6 DYSMENORRHEA: ICD-10-CM

## 2019-10-29 DIAGNOSIS — N92.1 MENORRHAGIA WITH IRREGULAR CYCLE: ICD-10-CM

## 2019-10-29 PROCEDURE — 99213 OFFICE O/P EST LOW 20 MIN: CPT | Performed by: OBSTETRICS & GYNECOLOGY

## 2019-10-29 ASSESSMENT — ENCOUNTER SYMPTOMS
COUGH: 0
BACK PAIN: 0
SHORTNESS OF BREATH: 0
ABDOMINAL PAIN: 0

## 2019-10-30 ENCOUNTER — TELEPHONE (OUTPATIENT)
Dept: OBGYN CLINIC | Age: 29
End: 2019-10-30

## 2019-11-21 DIAGNOSIS — N93.8 DUB (DYSFUNCTIONAL UTERINE BLEEDING): Primary | ICD-10-CM

## 2019-12-02 RX ORDER — ETONOGESTREL AND ETHINYL ESTRADIOL 11.7; 2.7 MG/1; MG/1
1 INSERT, EXTENDED RELEASE VAGINAL
Qty: 3 EACH | Refills: 3 | Status: SHIPPED | OUTPATIENT
Start: 2019-12-02 | End: 2019-12-18

## 2019-12-10 ENCOUNTER — TELEPHONE (OUTPATIENT)
Dept: OBGYN CLINIC | Age: 29
End: 2019-12-10

## 2019-12-18 ENCOUNTER — TELEPHONE (OUTPATIENT)
Dept: FAMILY MEDICINE CLINIC | Age: 29
End: 2019-12-18

## 2019-12-18 ENCOUNTER — OFFICE VISIT (OUTPATIENT)
Dept: FAMILY MEDICINE CLINIC | Age: 29
End: 2019-12-18
Payer: OTHER GOVERNMENT

## 2019-12-18 VITALS
WEIGHT: 144 LBS | SYSTOLIC BLOOD PRESSURE: 92 MMHG | BODY MASS INDEX: 26.5 KG/M2 | HEART RATE: 100 BPM | HEIGHT: 62 IN | DIASTOLIC BLOOD PRESSURE: 62 MMHG

## 2019-12-18 DIAGNOSIS — G89.29 CHRONIC LOW BACK PAIN, UNSPECIFIED BACK PAIN LATERALITY, UNSPECIFIED WHETHER SCIATICA PRESENT: ICD-10-CM

## 2019-12-18 DIAGNOSIS — M54.50 CHRONIC LOW BACK PAIN, UNSPECIFIED BACK PAIN LATERALITY, UNSPECIFIED WHETHER SCIATICA PRESENT: ICD-10-CM

## 2019-12-18 DIAGNOSIS — R61 HYPERHIDROSIS: ICD-10-CM

## 2019-12-18 DIAGNOSIS — F32.A ANXIETY AND DEPRESSION: ICD-10-CM

## 2019-12-18 DIAGNOSIS — F17.290 OTHER TOBACCO PRODUCT NICOTINE DEPENDENCE, UNCOMPLICATED: ICD-10-CM

## 2019-12-18 DIAGNOSIS — Z76.89 ENCOUNTER TO ESTABLISH CARE: Primary | ICD-10-CM

## 2019-12-18 DIAGNOSIS — F43.10 PTSD (POST-TRAUMATIC STRESS DISORDER): ICD-10-CM

## 2019-12-18 DIAGNOSIS — N80.9 ENDOMETRIOSIS: ICD-10-CM

## 2019-12-18 DIAGNOSIS — F41.9 ANXIETY AND DEPRESSION: ICD-10-CM

## 2019-12-18 PROCEDURE — 99203 OFFICE O/P NEW LOW 30 MIN: CPT | Performed by: NURSE PRACTITIONER

## 2019-12-18 ASSESSMENT — ENCOUNTER SYMPTOMS
CONSTIPATION: 0
SHORTNESS OF BREATH: 0
NAUSEA: 0
COUGH: 0
GASTROINTESTINAL NEGATIVE: 1
EYES NEGATIVE: 1
DIARRHEA: 0
RESPIRATORY NEGATIVE: 1
VOMITING: 0
ABDOMINAL PAIN: 0
BACK PAIN: 1

## 2019-12-18 NOTE — TELEPHONE ENCOUNTER
There are several device makers. This is one of the most common: [email or web address] - she will need a script for the machine(I had given her one, but might need to come from you) and a letter of medical necessity (can print off the above website) - ICD 10 codes etc can also be found on above website and then you can PA device - biggest problem with iontophoresis devices are the companies typically do not ac cept insurance payment so usually -patient has to buy device then submit to insurance for reimbursement themself - there are other devices (I think I had provided a couple choices to patient at our last visit) so just confirm with her which device she wants to PA,    Thanks     Davian Moore

## 2019-12-18 NOTE — TELEPHONE ENCOUNTER
Patient is here to establish as a new patient. She was working with your office to get an Iontophoresis Device and she would like my help to proceed with this. With her  insurance apparently she is required to get orders from me. Please advise on how I can help with this.

## 2020-01-06 ENCOUNTER — TELEPHONE (OUTPATIENT)
Dept: OBGYN CLINIC | Age: 30
End: 2020-01-06

## 2020-01-06 NOTE — TELEPHONE ENCOUNTER
Patient wants to know if there is something else she can take for pain for her procedure on 1/31.  The 600mg of ibuprofen does not work

## 2020-01-06 NOTE — TELEPHONE ENCOUNTER
Pt reassured that she can take Aleve if she needs or what ever she takes for comfort with any type of cramping

## 2020-01-31 ENCOUNTER — HOSPITAL ENCOUNTER (OUTPATIENT)
Age: 30
Setting detail: SPECIMEN
Discharge: HOME | End: 2020-01-31
Payer: OTHER GOVERNMENT

## 2020-01-31 ENCOUNTER — PROCEDURE VISIT (OUTPATIENT)
Dept: OBGYN CLINIC | Age: 30
End: 2020-01-31
Payer: OTHER GOVERNMENT

## 2020-01-31 VITALS
BODY MASS INDEX: 26.5 KG/M2 | DIASTOLIC BLOOD PRESSURE: 74 MMHG | WEIGHT: 144 LBS | HEIGHT: 62 IN | HEART RATE: 80 BPM | SYSTOLIC BLOOD PRESSURE: 130 MMHG

## 2020-01-31 PROCEDURE — 58100 BIOPSY OF UTERUS LINING: CPT | Performed by: OBSTETRICS & GYNECOLOGY

## 2020-01-31 NOTE — PROGRESS NOTES
[facility] PHYSICIANS  [facility] [address]  [facility] [address]  Dept: [phone]    2020    David Rosario is a 30 y.o. female,       No LMP recorded. (Menstrual status: Irregular periods).     Chief Complaint   Patient presents with    Procedure     endometrial biopsy       Urine pregnancy test: negative     Past Medical History:   Diagnosis Date    Anxiety     Chronic low back pain     Chronic low back pain     Depression     Endometriosis     HPV (human papilloma virus) anogenital infection     Hyperhidrosis     PTSD (post-traumatic stress disorder)          Past Surgical History:   Procedure Laterality Date     SECTION      COLPOSCOPY      , 2011 x 2     TONSILLECTOMY      WISDOM TOOTH EXTRACTION           Family History   Problem Relation Age of Onset    Depression Mother     Anxiety Disorder Mother     Depression Father     Ovarian Cancer Maternal Aunt         great aunt     ADHD Brother     Other Brother         autism     Osteoarthritis Maternal Grandmother     Cancer Maternal Grandfather         skin    Other Maternal Grandfather         lung mass unknown if cancer     COPD Maternal Grandfather     ADHD Brother     Depression Brother     No Known Problems Paternal Grandmother     Colon Cancer Paternal Grandfather         COD    Diabetes type 2  Maternal Aunt     ADHD Son     Other Son         ODD          Social History     Tobacco Use    Smoking status: Former Smoker     Packs/day: 0.50     Years: 17.00     Pack years: 8.50     Types: Cigarettes     Last attempt to quit: 2018     Years since quittin.0    Smokeless tobacco: Never Used   Substance Use Topics    Alcohol use: Yes     Comment: social     Drug use: No         Current Outpatient Medications   Medication Sig Dispense Refill    norelgestromin-ethinyl estradiol (ORTHO EVRA) 150-35 MCG/24HR Place 1 patch onto the skin every 7 days Pt to use continuously and not have a period. 4 patch 5     No current facility-administered medications for this visit. Allergies as of 2020 - Review Complete 2020   Allergen Reaction Noted    Codeine  2017    Tylenol [acetaminophen]  2017    Vicodin [hydrocodone-acetaminophen]  2017         Diagnostics:  No results found. Blood pressure 130/74, pulse 80, height 5' 2\" (1.575 m), weight 144 lb (65.3 kg), not currently breastfeeding. The patient was counseled on the procedure. Risks, benefits and alternatives were reviewed. The patient is aware that this is diagnostic and not curative and a second procedure may be needed. A consent was reviewed and obtained. The patient was positioned comfortably on the exam table. After a bi-manual exam; the uterus was found to be  anteverted with a size of 8 cm. There was no adnexal masses and the bladder was smooth, non-tender and without palpable masses. A sterile speculum was placed into the vagina and the cervix was identified. It was stabilized with a tenaculum. It was cleansed with betadine and the aspirator was then gently passed into the endometrial cavity. Tissue was obtained and sent to pathology. The patient tolerated the procedure well. Post procedure restrictions were reviewed and given to the patient. .  All counts and instruments were correct at the end of the procedure. Assessment:   Diagnosis Orders   1. Breakthrough bleeding on contraceptive patch     2. Dysmenorrhea       Patient Active Problem List    Diagnosis Date Noted    Chronic low back pain     Endometriosis     History of low transverse  section 2018    Anxiety and depression 2018    PTSD (post-traumatic stress disorder) 2018           PLAN:  Will call pt with the pathology of the biopsy and for further recommendations. Patient is considering endometrial ablation.     Yulisa Benton MD

## 2020-02-03 LAB — SURGICAL PATHOLOGY REPORT: NORMAL

## 2020-02-05 ENCOUNTER — OFFICE VISIT (OUTPATIENT)
Dept: FAMILY MEDICINE CLINIC | Age: 30
End: 2020-02-05
Payer: OTHER GOVERNMENT

## 2020-02-05 VITALS
DIASTOLIC BLOOD PRESSURE: 70 MMHG | WEIGHT: 138 LBS | OXYGEN SATURATION: 99 % | HEART RATE: 75 BPM | HEIGHT: 62 IN | SYSTOLIC BLOOD PRESSURE: 110 MMHG | BODY MASS INDEX: 25.4 KG/M2

## 2020-02-05 PROBLEM — M79.7 FIBROMYALGIA: Status: ACTIVE | Noted: 2020-02-05

## 2020-02-05 PROCEDURE — 99213 OFFICE O/P EST LOW 20 MIN: CPT | Performed by: FAMILY MEDICINE

## 2020-02-05 RX ORDER — CYCLOBENZAPRINE HCL 10 MG
10 TABLET ORAL 3 TIMES DAILY PRN
Qty: 10 TABLET | Refills: 1 | Status: SHIPPED | OUTPATIENT
Start: 2020-02-05 | End: 2020-09-11

## 2020-02-05 ASSESSMENT — ENCOUNTER SYMPTOMS
ALLERGIC/IMMUNOLOGIC NEGATIVE: 1
SHORTNESS OF BREATH: 0
COUGH: 0
NAUSEA: 0
EYES NEGATIVE: 1
DIARRHEA: 0

## 2020-02-05 NOTE — PROGRESS NOTES
[facility] PHYSICIANS  [facility]  [address]  [place] [address]  Dept: [phone]    2/5/2020    CHIEF COMPLAINT    Chief Complaint   Patient presents with    Neck Pain     Symptoms started 2 weeks ago        HPI    Colin Pickens is a 30 y.o. female who presents   Chief Complaint   Patient presents with    Neck Pain     Symptoms started 2 weeks ago    . New onset neck pain, starts at occiput into trapezoids. Has used massage tool at home. Has been stress at home recently. Hx of anxiety, derpession, ptsd. She weaned herself off all meds for those conditions and is feeling well most days. Neck Pain    This is a new problem. The current episode started 1 to 4 weeks ago. The problem occurs daily. The pain is present in the midline. The quality of the pain is described as aching. The pain is moderate. Associated symptoms include headaches. Pertinent negatives include no chest pain or fever. She has tried home exercises, heat and NSAIDs for the symptoms. The treatment provided mild relief. Vitals:    02/05/20 1037   BP: 110/70   Pulse: 75   SpO2: 99%   Weight: 138 lb (62.6 kg)   Height: 5' 2\" (1.575 m)       REVIEW OF SYSTEMS    Review of Systems   Constitutional: Negative for fatigue, fever and unexpected weight change. HENT: Negative. Eyes: Negative. Respiratory: Negative for cough and shortness of breath. Cardiovascular: Negative for chest pain, palpitations and leg swelling. Gastrointestinal: Negative for diarrhea and nausea. Endocrine: Negative. Genitourinary: Negative for menstrual problem. Musculoskeletal: Positive for neck pain. Skin: Negative. Allergic/Immunologic: Negative. Neurological: Positive for headaches. Negative for dizziness. Hematological: Negative. Psychiatric/Behavioral: Negative for sleep disturbance. The patient is not nervous/anxious.         PAST MEDICAL HISTORY    Past Medical

## 2020-02-05 NOTE — PATIENT INSTRUCTIONS
Patient Education        Neck Strain or Sprain: Rehab Exercises  Introduction  Here are some examples of exercises for you to try. The exercises may be suggested for a condition or for rehabilitation. Start each exercise slowly. Ease off the exercises if you start to have pain. You will be told when to start these exercises and which ones will work best for you. How to do the exercises  Neck rotation   1. Sit in a firm chair, or stand up straight. 2. Keeping your chin level, turn your head to the right, and hold for 15 to 30 seconds. 3. Turn your head to the left and hold for 15 to 30 seconds. 4. Repeat 2 to 4 times to each side. Neck stretches   1. Look straight ahead, and tip your right ear to your right shoulder. Do not let your left shoulder rise up as you tip your head to the right. 2. Hold for 15 to 30 seconds. 3. Tilt your head to the left. Do not let your right shoulder rise up as you tip your head to the left. 4. Hold for 15 to 30 seconds. 5. Repeat 2 to 4 times to each side. Forward neck flexion   1. Sit in a firm chair, or stand up straight. 2. Bend your head forward. 3. Hold for 15 to 30 seconds. 4. Repeat 2 to 4 times. Lateral (side) bend strengthening   1. With your right hand, place your first two fingers on your right temple. 2. Start to bend your head to the side while using gentle pressure from your fingers to keep your head from bending. 3. Hold for about 6 seconds. 4. Repeat 8 to 12 times. 5. Switch hands and repeat the same exercise on your left side. Forward bend strengthening   1. Place your first two fingers of either hand on your forehead. 2. Start to bend your head forward while using gentle pressure from your fingers to keep your head from bending. 3. Hold for about 6 seconds. 4. Repeat 8 to 12 times. Neutral position strengthening   1. Using one hand, place your fingertips on the back of your head at the top of your neck.   2. Start to bend your head

## 2020-06-11 ENCOUNTER — TELEPHONE (OUTPATIENT)
Dept: OBGYN CLINIC | Age: 30
End: 2020-06-11

## 2020-06-19 ENCOUNTER — OFFICE VISIT (OUTPATIENT)
Dept: OBGYN CLINIC | Age: 30
End: 2020-06-19
Payer: OTHER GOVERNMENT

## 2020-06-19 VITALS
DIASTOLIC BLOOD PRESSURE: 77 MMHG | BODY MASS INDEX: 27.16 KG/M2 | HEART RATE: 91 BPM | WEIGHT: 147.6 LBS | HEIGHT: 62 IN | SYSTOLIC BLOOD PRESSURE: 110 MMHG

## 2020-06-19 PROCEDURE — 99395 PREV VISIT EST AGE 18-39: CPT | Performed by: OBSTETRICS & GYNECOLOGY

## 2020-06-19 ASSESSMENT — ENCOUNTER SYMPTOMS
BACK PAIN: 0
COUGH: 0
ABDOMINAL PAIN: 0
SHORTNESS OF BREATH: 0

## 2020-06-19 NOTE — PROGRESS NOTES
deformation or tenderness. External Genitalia: Normal development without vulvar,vaginal or cervical lesions noted. Normal vaginal discharge, uterus anterior, 4-6 weeks without CMT. Adnexa nontender without abnormal masses bilaterally. Rectal Exam: Omitted. Extremities: Nontender without clubbing, cyanosis or edema. F.R.O.M. Neurologic Exam: Grossly intact without noted sensorimotor deficits and oriented x 3. Assessment/Plan:   Unremarkable annual Gyn exam.    Cervical Cytology Evaluation begins at 24years old. If Negative Cytology, Follow-up screening per current guidelines. Mammograms every 1year. If 37 yo and last mammogram was negative. Pt would like to try OCPs for the summer, so will send in Rx when she is done with her patches, then will go back to patches in the fall  Birth control and barrier recommendations discussed. STD counseling and prevention reviewed. Routine health maintenance per patients PCP.   Pt to follow up for annual exam in 1 year    Laney Collet, MD  [address]

## 2020-08-21 ENCOUNTER — TELEPHONE (OUTPATIENT)
Dept: FAMILY MEDICINE CLINIC | Age: 30
End: 2020-08-21

## 2020-08-21 RX ORDER — PREDNISONE 20 MG/1
40 TABLET ORAL DAILY
Qty: 6 TABLET | Refills: 0 | Status: SHIPPED | OUTPATIENT
Start: 2020-08-21 | End: 2020-08-21 | Stop reason: SDUPTHER

## 2020-08-21 RX ORDER — CLOBETASOL PROPIONATE 0.5 MG/G
OINTMENT TOPICAL
Qty: 60 G | Refills: 0 | Status: SHIPPED | OUTPATIENT
Start: 2020-08-21 | End: 2020-09-11

## 2020-08-21 RX ORDER — PREDNISONE 20 MG/1
40 TABLET ORAL DAILY
Qty: 6 TABLET | Refills: 0 | Status: SHIPPED | OUTPATIENT
Start: 2020-08-21 | End: 2020-08-24

## 2020-08-21 RX ORDER — CLOBETASOL PROPIONATE 0.5 MG/G
OINTMENT TOPICAL
Qty: 60 G | Refills: 0 | Status: SHIPPED | OUTPATIENT
Start: 2020-08-21 | End: 2020-08-21 | Stop reason: SDUPTHER

## 2020-08-21 NOTE — TELEPHONE ENCOUNTER
Sent in a steroid ointment and steroid pills to be started tonight. Prednisone can cause insomnia so advised her that she take it ASAP. May also cause heart palpitations, hot flashes and bad aftertaste so do not take with water.     Call provider on call with any further concerns

## 2020-08-21 NOTE — TELEPHONE ENCOUNTER
When when she stung? If she is breathing okay and does not feel that her throat is closing up she does not need an EpiPen. I would suggest that she use cool compresses, ibuprofen to help with swelling and Zyrtec or Claritin (generic form of either is fine) to help with the itching. If swelling is very significant than we can call in a Medrol Dosepak.

## 2020-08-21 NOTE — TELEPHONE ENCOUNTER
Pt stung by a wasp on the back of her leg has rednedd, bruising, itching, painful, no prob breaching or swollowing but is wondering if she needs to have an Epi Pen ordered her other family members are allergic to wasp

## 2020-08-21 NOTE — TELEPHONE ENCOUNTER
Patient called stating that she was stung yesterday has been doing cool compress has been taking Ibuprohn and Claritin there is bruising and significant swelling. Grandma told her to ask about Epi pen since grandma and mom are both allergic to bee stings. Patient stated that she is not having trouble breathing nor closing of the throat.  Grandma told her to make a paste out of baking soda and water that did not work

## 2020-08-26 NOTE — TELEPHONE ENCOUNTER
Late entry: after-hours call received on Friday regarding prescriptions that were sent in the pharmacy did not receive.      Resent prescription

## 2020-08-31 ENCOUNTER — TELEPHONE (OUTPATIENT)
Dept: FAMILY MEDICINE CLINIC | Age: 30
End: 2020-08-31

## 2020-08-31 ENCOUNTER — NURSE TRIAGE (OUTPATIENT)
Dept: OTHER | Facility: CLINIC | Age: 30
End: 2020-08-31

## 2020-08-31 NOTE — TELEPHONE ENCOUNTER
Reviewed nurse triage looks like this is been going on since March/April please use same day on Wednesday

## 2020-08-31 NOTE — TELEPHONE ENCOUNTER
There is a 11:00 and a 1:00. Please call patient and see if she could make it to either 1 of those appointments today.   It would be good to see her ear rather than a video visit if possible

## 2020-09-01 ENCOUNTER — TELEPHONE (OUTPATIENT)
Dept: FAMILY MEDICINE CLINIC | Age: 30
End: 2020-09-01

## 2020-09-01 NOTE — TELEPHONE ENCOUNTER
Pt went to [address] [place] [address]  yesterday for ear pain was given antibiotic she has appt to see you tomorrow for the same thing should she keep it or wait for me to work. Her ins person told her to keep the appt.

## 2020-09-11 ENCOUNTER — OFFICE VISIT (OUTPATIENT)
Dept: FAMILY MEDICINE CLINIC | Age: 30
End: 2020-09-11
Payer: OTHER GOVERNMENT

## 2020-09-11 VITALS
OXYGEN SATURATION: 98 % | SYSTOLIC BLOOD PRESSURE: 120 MMHG | HEART RATE: 86 BPM | DIASTOLIC BLOOD PRESSURE: 80 MMHG | TEMPERATURE: 98.4 F | BODY MASS INDEX: 28.9 KG/M2 | WEIGHT: 158 LBS

## 2020-09-11 PROCEDURE — 99213 OFFICE O/P EST LOW 20 MIN: CPT | Performed by: NURSE PRACTITIONER

## 2020-09-11 RX ORDER — FLUCONAZOLE 150 MG/1
150 TABLET ORAL
Qty: 2 TABLET | Refills: 1 | Status: SHIPPED | OUTPATIENT
Start: 2020-09-11 | End: 2020-10-29 | Stop reason: CLARIF

## 2020-09-11 RX ORDER — CEFDINIR 300 MG/1
300 CAPSULE ORAL 2 TIMES DAILY
Qty: 20 CAPSULE | Refills: 0 | Status: SHIPPED | OUTPATIENT
Start: 2020-09-11 | End: 2020-09-21

## 2020-09-11 ASSESSMENT — ENCOUNTER SYMPTOMS
COUGH: 0
ABDOMINAL PAIN: 0
SORE THROAT: 0
RESPIRATORY NEGATIVE: 1
RHINORRHEA: 0
GASTROINTESTINAL NEGATIVE: 1
EYES NEGATIVE: 1

## 2020-09-11 NOTE — PROGRESS NOTES
[facility]  [facility]  [address]  [facility] [address]  Dept: [phone]    9/11/2020    CHIEF COMPLAINT    Chief Complaint   Patient presents with   Lyric Bors       HPI    Suzette Franco is a 26 y.o. female who presents   Chief Complaint   Patient presents with    Otalgia   . Appointment to discuss right ear pain. Right ear pain-Treated August 31 at urgent care with Augmentin and Flonase. She reports she completed full course of antibiotics. She was diagnosed with an inner ear infection. Today right ear feels like she's talking under water, reports popping, continued pain and pressure. Noting pain in the ear, headaches on right side and right sided neck pain      Otalgia    There is pain in the right ear. This is a recurrent problem. The current episode started in the past 7 days. The problem has been unchanged. There has been no fever. Associated symptoms include ear discharge, headaches, hearing loss and neck pain. Pertinent negatives include no abdominal pain, coughing, rash, rhinorrhea or sore throat. She has tried antibiotics for the symptoms. The treatment provided mild relief. There is no history of a chronic ear infection or a tympanostomy tube. Vitals:    09/11/20 1057   BP: 120/80   Site: Left Upper Arm   Position: Sitting   Cuff Size: Medium Adult   Pulse: 86   Temp: 98.4 °F (36.9 °C)   TempSrc: Temporal   SpO2: 98%   Weight: 158 lb (71.7 kg)       Wt Readings from Last 3 Encounters:   09/11/20 158 lb (71.7 kg)   06/19/20 147 lb 9.6 oz (67 kg)   02/05/20 138 lb (62.6 kg)     BP Readings from Last 3 Encounters:   09/11/20 120/80   06/19/20 110/77   02/05/20 110/70       REVIEW OF SYSTEMS    Review of Systems   Constitutional: Positive for chills. Negative for fatigue and fever. HENT: Positive for ear discharge, ear pain and hearing loss. Negative for rhinorrhea and sore throat. Eyes: Negative.   Negative for visual disturbance (wearing glasses). Respiratory: Negative. Negative for cough. Gastrointestinal: Negative. Negative for abdominal pain. Musculoskeletal: Positive for neck pain. Skin: Negative for rash. Neurological: Positive for light-headedness and headaches. PAST MEDICAL HISTORY    Past Medical History:   Diagnosis Date    Anxiety     Chronic low back pain     Chronic low back pain     Depression     Endometriosis     Fibromyalgia 2020    HPV (human papilloma virus) anogenital infection     Hyperhidrosis     PTSD (post-traumatic stress disorder)        FAMILY HISTORY    Family History   Problem Relation Age of Onset    Depression Mother     Anxiety Disorder Mother     Depression Father     Ovarian Cancer Maternal Aunt         great aunt     ADHD Brother     Other Brother         autism     Osteoarthritis Maternal Grandmother     Cancer Maternal Grandfather         skin    COPD Maternal Grandfather     Lung Cancer Maternal Grandfather     ADHD Brother     Depression Brother     No Known Problems Paternal Grandmother     Colon Cancer Paternal Grandfather         COD    Diabetes type 2  Maternal Aunt     ADHD Son     Other Son         ODD        SOCIAL HISTORY    Social History     Socioeconomic History    Marital status:      Spouse name: None    Number of children: 1    Years of education: None    Highest education level: None   Occupational History    Occupation: stay at home mom    Social Needs    Financial resource strain: None    Food insecurity     Worry: None     Inability: None    Transportation needs     Medical: None     Non-medical: None   Tobacco Use    Smoking status: Former Smoker     Packs/day: 0.50     Years: 17.00     Pack years: 8.50     Types: Cigarettes     Last attempt to quit: 2018     Years since quittin.6    Smokeless tobacco: Never Used   Substance and Sexual Activity    Alcohol use: Yes     Comment: social     Drug use:  No distress. Appearance: She is well-developed and well-groomed. She is not ill-appearing, toxic-appearing or diaphoretic. HENT:      Head: Normocephalic. Right Ear: Hearing, ear canal and external ear normal. No swelling or tenderness. A middle ear effusion (few clear fluid bubbles ) is present. Tympanic membrane is not perforated, erythematous or bulging. Left Ear: Hearing, tympanic membrane, ear canal and external ear normal.      Nose: Nose normal.      Mouth/Throat:      Lips: Pink. Tongue: No lesions. Pharynx: Oropharynx is clear. No pharyngeal swelling or oropharyngeal exudate. Eyes:      General:         Right eye: No discharge. Left eye: No discharge. Pupils: Pupils are equal.   Neck:      Musculoskeletal: Normal range of motion. Cardiovascular:      Rate and Rhythm: Normal rate and regular rhythm. Pulses: Normal pulses. Radial pulses are 2+ on the right side and 2+ on the left side. Heart sounds: Normal heart sounds, S1 normal and S2 normal. No murmur. Pulmonary:      Effort: Pulmonary effort is normal. No respiratory distress. Breath sounds: Normal breath sounds. No wheezing. Lymphadenopathy:      Head:      Right side of head: No preauricular or posterior auricular adenopathy. Left side of head: No preauricular or posterior auricular adenopathy. Cervical: No cervical adenopathy. Upper Body:      Right upper body: No supraclavicular adenopathy. Left upper body: No supraclavicular adenopathy. Skin:     General: Skin is warm and dry. Neurological:      Mental Status: She is alert and oriented to person, place, and time. Psychiatric:         Behavior: Behavior normal. Behavior is cooperative. ASSESSMENT/PLAN  1. Non-recurrent acute suppurative otitis media of right ear without spontaneous rupture of tympanic membrane    - cefdinir (OMNICEF) 300 MG capsule;  Take 1 capsule by mouth 2 times daily for 10 days Dispense: 20 capsule; Refill: 0  -We will treat another course of antibiotics. -Advised patient to take probiotics given 2 courses of strong antibiotics in a short period of time. - Advised patient to take over-the-counter pain relievers, monitor for fevers, rest, increase fluids and notify office if symptoms worsen or fail to improve. - If symptoms persist after completion of second antibiotic will refer to ENT. Patient would like to see Dr. Meme Zepeda as this is who her mom has seen in the past.    2. Antibiotic-induced yeast infection    - fluconazole (DIFLUCAN) 150 MG tablet; Take 1 tablet by mouth every 72 hours as needed (vaginitis)  Dispense: 2 tablet; Refill: 1  -Patient indicates she is prone to yeast infections, but has not had one secondary to an antibiotic.  -We will send in prescription for Diflucan. Advised patient on how to properly take medication and to not take it if she does not have symptoms    Alem received counseling on the following healthy behaviors: nutrition, exercise and medication adherence  Reviewed prior labs and health maintenance. Continue current medications, diet and exercise. Discussed use, benefit, and side effects of prescribed medications. Barriers to medication compliance addressed. Patient given educational materials - see patient instructions. All patient questions answered. Patient voiced understanding. Return if symptoms worsen or fail to improve.     (Please note that portions of this note were completed with a voice recognition program. Efforts were made to edit the dictations but occasionally words are mis-transcribed.)      Electronically signed by ALEX Richardson CNP on 9/11/20 at 11:24 AM EDT

## 2020-09-14 ENCOUNTER — TELEPHONE (OUTPATIENT)
Dept: FAMILY MEDICINE CLINIC | Age: 30
End: 2020-09-14

## 2020-09-14 NOTE — TELEPHONE ENCOUNTER
Call from pt her ear pain is not any better since Fri she would like to be referred to an ENT, Shelby Martinez MD / [facility].

## 2020-09-25 ENCOUNTER — TELEPHONE (OUTPATIENT)
Dept: OBGYN CLINIC | Age: 30
End: 2020-09-25

## 2020-09-25 NOTE — TELEPHONE ENCOUNTER
Pt called in stating she is on her last refill of the patchs and is requesting [facility] once her patch is complete she states that you told her to call and let us know when she was ready for the pills to be sent.

## 2020-09-28 RX ORDER — LEVONORGESTREL AND ETHINYL ESTRADIOL 0.15-0.03
1 KIT ORAL DAILY
Qty: 4 PACKET | Refills: 3 | Status: SHIPPED | OUTPATIENT
Start: 2020-09-28 | End: 2021-04-05

## 2020-09-28 RX ORDER — LEVONORGESTREL AND ETHINYL ESTRADIOL 0.15-0.03
1 KIT ORAL DAILY
Qty: 1 PACKET | Refills: 3 | Status: SHIPPED | OUTPATIENT
Start: 2020-09-28 | End: 2020-09-28 | Stop reason: SDUPTHER

## 2020-09-28 NOTE — TELEPHONE ENCOUNTER
Patient would like the [facility] and states she plans on staying on the pill long term

## 2020-09-28 NOTE — TELEPHONE ENCOUNTER
Please let pt know that they were sent to [facility] since it she is only going to be on the pills for a few months.   Thanks

## 2020-10-29 ENCOUNTER — VIRTUAL VISIT (OUTPATIENT)
Dept: FAMILY MEDICINE CLINIC | Age: 30
End: 2020-10-29
Payer: OTHER GOVERNMENT

## 2020-10-29 PROCEDURE — 99443 PR PHYS/QHP TELEPHONE EVALUATION 21-30 MIN: CPT | Performed by: NURSE PRACTITIONER

## 2020-10-29 ASSESSMENT — ENCOUNTER SYMPTOMS
BLOATING: 1
RESPIRATORY NEGATIVE: 1
CONSTIPATION: 1
NAUSEA: 0
SHORTNESS OF BREATH: 0
DIARRHEA: 1
ABDOMINAL PAIN: 1
COUGH: 0

## 2020-10-29 NOTE — PATIENT INSTRUCTIONS
[facility]   Phone [phone]   *available 24/7 365 days a year    [facility] [address] Friday and Saturday 9-5 pm   Address [address] [place] [address]     Patient Education        Constipation: Care Instructions  Your Care Instructions     Constipation means that you have a hard time passing stools (bowel movements). People pass stools from 3 times a day to once every 3 days. What is normal for you may be different. Constipation may occur with pain in the rectum and cramping. The pain may get worse when you try to pass stools. Sometimes there are small amounts of bright red blood on toilet paper or the surface of stools. This is because of enlarged veins near the rectum (hemorrhoids). A few changes in your diet and lifestyle may help you avoid ongoing constipation. Your doctor may also prescribe medicine to help loosen your stool. Some medicines can cause constipation. These include pain medicines and antidepressants. Tell your doctor about all the medicines you take. Your doctor may want to make a medicine change to ease your symptoms. Follow-up care is a key part of your treatment and safety. Be sure to make and go to all appointments, and call your doctor if you are having problems. It's also a good idea to know your test results and keep a list of the medicines you take. How can you care for yourself at home? · Drink plenty of fluids, enough so that your urine is light yellow or clear like water. If you have kidney, heart, or liver disease and have to limit fluids, talk with your doctor before you increase the amount of fluids you drink. · Include high-fiber foods in your diet each day. These include fruits, vegetables, beans, and whole grains. · Get at least 30 minutes of exercise on most days of the week. Walking is a good choice. You also may want to do other activities, such as running, swimming, cycling, or playing tennis or team sports.   · Take a fiber supplement, such as Citrucel or Metamucil, every day. Read and follow all instructions on the label. · Schedule time each day for a bowel movement. A daily routine may help. Take your time having your bowel movement. · Support your feet with a small step stool when you sit on the toilet. This helps flex your hips and places your pelvis in a squatting position. · Your doctor may recommend an over-the-counter laxative to relieve your constipation. Examples are Milk of Magnesia and MiraLax. Read and follow all instructions on the label. Do not use laxatives on a long-term basis. When should you call for help? Call your doctor now or seek immediate medical care if:    · You have new or worse belly pain.     · You have new or worse nausea or vomiting.     · You have blood in your stools. Watch closely for changes in your health, and be sure to contact your doctor if:    · Your constipation is getting worse.     · You do not get better as expected. Where can you learn more? Go to https://Art of Defence.ViralNinjas. org and sign in to your Onzo account. Enter [phone] in the [facility] box to learn more about \"Constipation: Care Instructions. \"     If you do not have an account, please click on the \"Sign Up Now\" link. Current as of: June 26, 2019               Content Version: 12.6  © 5912-6580 [employer]. Care instructions adapted under license by [facility] ([facility]). If you have questions about a medical condition or this instruction, always ask your healthcare professional. [place] [address] any warranty or liability for your use of this information. Patient Education        Learning About Eating More Fruits and Vegetables  What are some quick tips for eating more fruits and vegetables? We're all encouraged to eat more fruits and vegetables.  Yet it can seem like one more chore on the daily to-do list. But you can add color and crunch to your chip cookies even better with grated carrots added to the mix. Where can you learn more? Go to https://chpepiceweb.VOICEPLATE.COM. org and sign in to your loanDepot account. Enter F050 in the [facility] box to learn more about \"Learning About Eating More Fruits and Vegetables. \"     If you do not have an account, please click on the \"Sign Up Now\" link. Current as of: August 22, 2019               Content Version: 12.6  © 1621-3543 [employer]. Care instructions adapted under license by [facility] ([facility]). If you have questions about a medical condition or this instruction, always ask your healthcare professional. [place] [address] any warranty or liability for your use of this information. Patient Education        Eating Healthy Foods: Care Instructions  Your Care Instructions     Eating healthy foods can help lower your risk for disease. Healthy food gives you energy and keeps your heart strong, your brain active, your muscles working, and your bones strong. A healthy diet includes a variety of foods from the basic food groups: grains, vegetables, fruits, milk and milk products, and meat and beans. Some people may eat more of their favorite foods from only one food group and, as a result, miss getting the nutrients they need. So, it is important to pay attention not only to what you eat but also to what you are missing from your diet. You can eat a healthy, balanced diet by making a few small changes. Follow-up care is a key part of your treatment and safety. Be sure to make and go to all appointments, and call your doctor if you are having problems. It's also a good idea to know your test results and keep a list of the medicines you take. How can you care for yourself at home? Look at what you eat  · Keep a food diary for a week or two and record everything you eat or drink. Track the number of servings you eat from each food group.   · For a balanced diet every day, eat a variety of:  ? 6 or more ounce-equivalents of grains, such as cereals, breads, crackers, rice, or pasta, every day. An ounce-equivalent is 1 slice of bread, 1 cup of ready-to-eat cereal, or ½ cup of cooked rice, cooked pasta, or cooked cereal.  ? 2½ cups of vegetables, especially:  § Dark-green vegetables such as broccoli and spinach. § Orange vegetables such as carrots and sweet potatoes. § Dry beans (such as ogden and kidney beans) and peas (such as lentils). ? 2 cups of fresh, frozen, or canned fruit. A small apple or 1 banana or orange equals 1 cup. ? 3 cups of nonfat or low-fat milk, yogurt, or other milk products. ? 5½ ounces of meat and beans, such as chicken, fish, lean meat, beans, nuts, and seeds. One egg, 1 tablespoon of peanut butter, ½ ounce nuts or seeds, or ¼ cup of cooked beans equals 1 ounce of meat. · Learn how to read food labels for serving sizes and ingredients. Fast-food and convenience-food meals often contain few or no fruits or vegetables. Make sure you eat some fruits and vegetables to make the meal more nutritious. · Look at your food diary. For each food group, add up what you have eaten and then divide the total by the number of days. This will give you an idea of how much you are eating from each food group. See if you can find some ways to change your diet to make it more healthy. Start small  · Do not try to make dramatic changes to your diet all at once. You might feel that you are missing out on your favorite foods and then be more likely to fail. · Start slowly, and gradually change your habits. Try some of the following:  ? Use whole wheat bread instead of white bread. ? Use nonfat or low-fat milk instead of whole milk. ? Eat brown rice instead of white rice, and eat whole wheat pasta instead of white-flour pasta. ? Try low-fat cheeses and low-fat yogurt. ? Add more fruits and vegetables to meals and have them for snacks.   ? Add lettuce, tomato, cucumber, disclaims any warranty or liability for your use of this information.

## 2020-10-29 NOTE — PROGRESS NOTES
[facility]  [facility]  [address]  [facility] [address]  Dept: [phone]    10/29/2020    Consent:  She and/or health care decision maker is aware that that she may receive a bill for this telephone service, depending on her insurance coverage, and has provided verbal consent to proceed: Yes    Arabella Desir is a 26 y.o. female evaluated via telephone on 10/29/2020. RAJ Chand is a 26 y.o. female who presents   Chief Complaint   Patient presents with    Constipation    Weight Management   . Telephone visit requested constipation and weight management. Constipation- feeling bloated and full intermittently. At times she has days where she will have BMs several times a day with no known association with her diet. Usually, she doesn't have BM's except for every 4-5 days. She describes that they are very inconsistent with the consistency of the stool  She has noted that whipped cream & avocado cause nausea and diarrhea. She indicates that turmeric and paprika give her hives. She can drink milk, ice cream, cheese, yogurt and cottage cheese without any issues. Weight concerns- Usually has a 12 oz cup of coffee or tea,   Eats a hot dog around 1-2 for lunch and dinner varies, chicken, pasta, pizza, beef ravioli, cabbage, carrots, kielbasa,  Not eating a lot of fresh fruits and vegetables. She is drinking [place] lot\" of water. Drinking some carbonated water that is lo-rochelle and no sugar. Anxiety/depression-Patient indicates that she had  SI with plan 1 month ago. She was going to drive off a bridge. She indicates that she spoke to her brother &  and is doing much better now. She denies any recurrence of thoughts and feels like she has found some of her triggers and is taking more time for self-care.    She is not interested in referral to a psychiatrist, psychologist or interested in starting medications at this time. Patient saw ENT on September 24th for persistent ear pain and sensation of fluid in ears. ENT indicated that her ear pain was coming from TMJ. A laryngoscopic was performed and laryngopharyngeal reflux is noted. Lastly, an audiogram was performed and very mild sensorineural hearing loss was noted in the right ear patient was advised to use Flonase 2 sprays in each nostril daily to help open the eustachian tubes and decrease nasal ingestion. 3-month follow-up with repeat audiogram is planned with MRI of brain if asymmetry is noted on follow-up audiogram    Constipation   This is a new problem. The current episode started in the past 7 days. The problem has been waxing and waning since onset. Her stool frequency is 2 to 3 times per week. The stool is described as firm, loose and formed. The patient is not on a high fiber diet. She does not exercise regularly. There has not been adequate water intake. Associated symptoms include abdominal pain, bloating and diarrhea. Pertinent negatives include no fever, hemorrhoids, melena or nausea. Risk factors include obesity. She has tried nothing for the symptoms. The treatment provided no relief. There is no history of irritable bowel syndrome. Patient-Reported Vitals 10/29/2020   Patient-Reported Weight 155   Patient-Reported Height 5 2      There were no vitals filed for this visit. REVIEW OF SYSTEMS    Review of Systems   Constitutional: Negative. Negative for appetite change, chills, fatigue and fever. HENT: Negative. Respiratory: Negative. Negative for cough and shortness of breath. Cardiovascular: Negative. Negative for chest pain and palpitations. Gastrointestinal: Positive for abdominal pain, bloating, constipation and diarrhea. Negative for hemorrhoids, melena and nausea. Psychiatric/Behavioral: Positive for dysphoric mood and suicidal ideas ( Recent suicidal ideations. See HPI). The patient is nervous/anxious. PAST MEDICAL HISTORY    Past Medical History:   Diagnosis Date    Anxiety     Chronic low back pain     Chronic low back pain     Depression     Endometriosis     Fibromyalgia 2020    HPV (human papilloma virus) anogenital infection     Hyperhidrosis     PTSD (post-traumatic stress disorder)        FAMILY HISTORY    Family History   Problem Relation Age of Onset    Depression Mother     Anxiety Disorder Mother     Depression Father     Ovarian Cancer Maternal Aunt         great aunt     ADHD Brother     Other Brother         autism     Osteoarthritis Maternal Grandmother     Cancer Maternal Grandfather         skin    COPD Maternal Grandfather     Lung Cancer Maternal Grandfather     ADHD Brother     Depression Brother     No Known Problems Paternal Grandmother     Colon Cancer Paternal Grandfather         COD    Diabetes type 2  Maternal Aunt     ADHD Son     Other Son         ODD        SOCIAL HISTORY    Social History     Socioeconomic History    Marital status:      Spouse name: Not on file    Number of children: 1    Years of education: Not on file    Highest education level: Not on file   Occupational History    Occupation: stay at home mom    Social Needs    Financial resource strain: Not on file    Food insecurity     Worry: Not on file     Inability: Not on file   [employer] needs     Medical: Not on file     Non-medical: Not on file   Tobacco Use    Smoking status: Former Smoker     Packs/day: 0.50     Years: 17.00     Pack years: 8.50     Types: Cigarettes     Last attempt to quit: 2018     Years since quittin.8    Smokeless tobacco: Never Used   Substance and Sexual Activity    Alcohol use: Yes     Comment: social     Drug use: No    Sexual activity: Yes     Partners: Male     Birth control/protection: Patch   Lifestyle    Physical activity     Days per week: Not on file     Minutes per session: Not on file    Stress: Not on file Relationships    Social connections     Talks on phone: Not on file     Gets together: Not on file     Attends Yazdanism service: Not on file     Active member of club or organization: Not on file     Attends meetings of clubs or organizations: Not on file     Relationship status: Not on file    Intimate partner violence     Fear of current or ex partner: Not on file     Emotionally abused: Not on file     Physically abused: Not on file     Forced sexual activity: Not on file   Other Topics Concern    Not on file   Social History Narrative    Not on file       SURGICAL HISTORY    Past Surgical History:   Procedure Laterality Date     SECTION      COLPOSCOPY      , 2011 x 2     ENDOMETRIAL BIOPSY  2020    results were WNL     TONSILLECTOMY      WISDOM TOOTH EXTRACTION         CURRENT MEDICATIONS    Current Outpatient Medications   Medication Sig Dispense Refill    levonorgestrel-ethinyl estradiol ([place] Speak 0.15/30, 28,) 0.15-30 MG-MCG per tablet Take 1 tablet by mouth daily Patient to take active pills continuously and not take the placebo pills, so that she does not have a period. 4 packet 3     No current facility-administered medications for this visit. ALLERGIES    Allergies   Allergen Reactions    Codeine      Per genetic testing she has found that she over-metabolizes medication     Other Hives     Paprika     Turmeric Hives    Tylenol [Acetaminophen]      Per genetic testing she has found that she over-metabolizes medication     Vicodin [Hydrocodone-Acetaminophen]      Per genetic testing she has found that she over-metabolizes medication        PHYSICAL EXAM   Physical Exam     Unable to perform physical assessment given that appointment was a telephone visit. Ender Meier received counseling on the following healthy behaviors: nutrition and exercise  Reviewed prior labs and health maintenance  Continue current medications, diet and exercise.   Discussed use, benefit, and side effects of prescribed medications. Barriers to medication compliance addressed. Patient given educational materials - see patient instructions  Was a self-tracking handout given in paper form or via [employer]? Yes    Requested Prescriptions      No prescriptions requested or ordered in this encounter       All patient questions answered. Patient voiced understanding. Quality Measures    There is no height or weight on file to calculate BMI. Elevated. Weight control planned discussed Healthy diet and regular exercise. Lab Results   Component Value Date    LDLCHOLESTEROL 93 02/27/2019    (goal LDL reduction with dx if diabetes is 50% LDL reduction)      PHQ Scores 4/30/2019 2/27/2019 2/21/2018 2/14/2018   PHQ2 Score 0 0 4 2   PHQ9 Score 0 0 16 2     Interpretation of Total Score Depression Severity: 1-4 = Minimal depression, 5-9 = Mild depression, 10-14 = Moderate depression, 15-19 = Moderately severe depression, 20-27 = Severe depression     ASSESSMENT/PLAN  1. Slow transit constipation  2. Weight gain    -Advised to decrease processed foods and increase her fruits, veggies, fiber and water intake.   -Advised meal prepping to aid in the ability to easily grab a healthy snack  -Advised to increase activity and increase meals from one daily to 5 small meals throughout the day. -Maintain adequate water intake.  -Will provide education in AVS regarding dietary modifications. 3. Anxiety and depression  4. PTSD (post-traumatic stress disorder)    -Chronic. Currently stable, but recently very unstable with suicidal ideations with a plan.   -Patient does not show any signs or symptoms of being a risk to herself today.  -Encouraged patient to reach out to rescue crisis, her brother and her  if thoughts of suicide recur.  -Encouraged continued self-care and speaking up when she needs a break from the stressors of caring for her son with ADHD and ODD  -Voiced understanding about importance of taking care of her mental health. Return in about 1 month (around 11/29/2020) for weight concerns . Documentation:  I communicated with the patient and/or health care decision maker about weight, constipation, anxiety and depression. Details of this discussion including any medical advice provided: see above     I affirm this is a Patient Initiated Episode with an Established Patient who has not had a related appointment within my department in the past 7 days or scheduled within the next 24 hours.     Total Time: minutes: 21-30 minutes    Note: not billable if this call serves to triage the patient into an appointment for the relevant concern    (Please note that portions of this note were completed with a voice recognition program. Efforts were made to edit the dictations but occasionally words are mis-transcribed.)     Electronically signed by ALEX Brown CNP on 10/29/20 at 9:24 AM EDT

## 2020-12-01 ENCOUNTER — VIRTUAL VISIT (OUTPATIENT)
Dept: FAMILY MEDICINE CLINIC | Age: 30
End: 2020-12-01
Payer: OTHER GOVERNMENT

## 2020-12-01 PROCEDURE — 99443 PR PHYS/QHP TELEPHONE EVALUATION 21-30 MIN: CPT | Performed by: NURSE PRACTITIONER

## 2020-12-01 ASSESSMENT — ENCOUNTER SYMPTOMS
SHORTNESS OF BREATH: 0
COUGH: 0
RESPIRATORY NEGATIVE: 1
ABDOMINAL PAIN: 1
CONSTIPATION: 1
DIARRHEA: 1
NAUSEA: 0

## 2020-12-01 NOTE — PROGRESS NOTES
[facility]  [facility]  [address]  [facility] [address]  Dept: [phone]    12/1/2020    Consent:  She and/or health care decision maker is aware that that she may receive a bill for this telephone service, depending on her insurance coverage, and has provided verbal consent to proceed: Yes    Janet Novak is a 26 y.o. female evaluated via telephone on 12/1/2020. HPI    Loren Parkinson is a 26 y.o. female who presents   Chief Complaint   Patient presents with    Depression   . Appointment for anxiety and depression follow-up. Anxiety/Depression- noting that her anxiety still waxes and wanes from time to time. Finding lately her Anxiety is higher at times and she's been feeling overwhelmed with things at home and with her son. Denies any SI/HI. Reports high stress from being a single income house. Feeling extra pressure from  to begin working. Extra stress creating arguments between her and her . Son requires extra help increasing patient stress when considering external care out of the home. Stomach issues and constipation- Improved with dietary changes and increased fiber. Reports she has difficulty with pills and capsules. Patient-Reported Vitals 12/1/2020   Patient-Reported Weight 157.8   Patient-Reported Height -   Patient-Reported Temperature 99.2        There were no vitals filed for this visit. REVIEW OF SYSTEMS    Review of Systems   Constitutional: Negative. Negative for appetite change, chills, fatigue and fever. HENT: Negative. Respiratory: Negative. Negative for cough and shortness of breath. Cardiovascular: Negative. Negative for chest pain and palpitations. Gastrointestinal: Positive for abdominal pain, constipation and diarrhea. Negative for nausea.         Improved constipation and GI sx with dietary changes with fiber   Psychiatric/Behavioral: Positive for dysphoric mood. Negative for self-injury and suicidal ideas. The patient is nervous/anxious.         PAST MEDICAL HISTORY    Past Medical History:   Diagnosis Date    Anxiety     Chronic low back pain     Chronic low back pain     Depression     Endometriosis     Fibromyalgia 2020    HPV (human papilloma virus) anogenital infection     Hyperhidrosis     PTSD (post-traumatic stress disorder)        FAMILY HISTORY    Family History   Problem Relation Age of Onset    Depression Mother     Anxiety Disorder Mother     Depression Father     Ovarian Cancer Maternal Aunt         great aunt     ADHD Brother     Other Brother         autism     Osteoarthritis Maternal Grandmother     Cancer Maternal Grandfather         skin    COPD Maternal Grandfather     Lung Cancer Maternal Grandfather     ADHD Brother     Depression Brother     No Known Problems Paternal Grandmother     Colon Cancer Paternal Grandfather         COD    Diabetes type 2  Maternal Aunt     ADHD Son     Other Son         ODD        SOCIAL HISTORY    Social History     Socioeconomic History    Marital status:      Spouse name: Not on file    Number of children: 1    Years of education: Not on file    Highest education level: Not on file   Occupational History    Occupation: stay at home mom    Social Needs    Financial resource strain: Not on file    Food insecurity     Worry: Not on file     Inability: Not on file   [employer] needs     Medical: Not on file     Non-medical: Not on file   Tobacco Use    Smoking status: Former Smoker     Packs/day: 0.50     Years: 17.00     Pack years: 8.50     Types: Cigarettes     Last attempt to quit: 2018     Years since quittin.9    Smokeless tobacco: Never Used   Substance and Sexual Activity    Alcohol use: Yes     Comment: social     Drug use: No    Sexual activity: Yes     Partners: Male     Birth control/protection: Patch   Lifestyle    Physical activity     Days per Vicodin [Hydrocodone-Acetaminophen]      Per genetic testing she has found that she over-metabolizes medication        PHYSICAL EXAM   Physical Exam    Unable to perform physical assessment given that appointment was a telephone visit. Rapid pressured speech noted at times, but overall patient mood and affect sound normal      Alem received counseling on the following healthy behaviors: nutrition, exercise and medication adherence  Reviewed prior labs and health maintenance  Continue current medications, diet and exercise. Discussed use, benefit, and side effects of prescribed medications. Barriers to medication compliance addressed. Patient given educational materials - see patient instructions  Was a self-tracking handout given in paper form or via [employer]? Yes    Requested Prescriptions     Signed Prescriptions Disp Refills    Fiber Adult Gummies 2 g CHEW 90 tablet 11     Sig: Take 4 g by mouth daily    Multiple Vitamins-Minerals (MULTIVITAMIN GUMMIES WOMENS) CHEW 90 tablet 5     Sig: Take 1 tablet by mouth daily    Probiotic Product (CULTURELLE PROBIOTICS) CHEW 90 tablet 5     Sig: Take 1 tablet by mouth daily    Probiotic Product (RA PROBIOTIC GUMMIES) CHEW 90 tablet 5     Sig: Take 1 tablet by mouth daily       All patient questions answered. Patient voiced understanding. Quality Measures    There is no height or weight on file to calculate BMI. Lab Results   Component Value Date    LDLCHOLESTEROL 93 02/27/2019    (goal LDL reduction with dx if diabetes is 50% LDL reduction)      PHQ Scores 4/30/2019 2/27/2019 2/21/2018 2/14/2018   PHQ2 Score 0 0 4 2   PHQ9 Score 0 0 16 2     Interpretation of Total Score Depression Severity: 1-4 = Minimal depression, 5-9 = Mild depression, 10-14 = Moderate depression, 15-19 = Moderately severe depression, 20-27 = Severe depression     ASSESSMENT/PLAN  1. Anxiety and depression  2.  PTSD (post-traumatic stress disorder)    -Extensive discussion about possible ways to navigate arguments and deal with some of her stressors within the home.  -Self-care and increased activity continues to be encouraged as forms of treatment. 3. Slow transit constipation    -Advised patient to continue over-the-counter's and dietary changes as those are helping.  -Will attempt to send in fiber Gummies, probiotics and a multivitamin. Will send 2 forms of probiotics to see which if any are covered.    -Patient advised over-the-counter's may or may not be covered by her insurance. - Fiber Adult Gummies 2 g CHEW; Take 4 g by mouth daily  Dispense: 90 tablet; Refill: 11  - Multiple Vitamins-Minerals (MULTIVITAMIN GUMMIES WOMENS) CHEW; Take 1 tablet by mouth daily  Dispense: 90 tablet; Refill: 5  - Probiotic Product (CULTURELLE PROBIOTICS) CHEW; Take 1 tablet by mouth daily  Dispense: 90 tablet; Refill: 5  - Probiotic Product (RA PROBIOTIC GUMMIES) CHEW; Take 1 tablet by mouth daily  Dispense: 90 tablet; Refill: 5    Return in about 4 months (around 4/1/2021) for Anxiety, depression. Documentation:  I communicated with the patient and/or health care decision maker about anxiety, depression and constipation . Details of this discussion including any medical advice provided: see above     I affirm this is a Patient Initiated Episode with an Established Patient who has not had a related appointment within my department in the past 7 days or scheduled within the next 24 hours.     Total Time: minutes: 21-30 minutes ( 38 minutes)    Note: not billable if this call serves to triage the patient into an appointment for the relevant concern    (Please note that portions of this note were completed with a voice recognition program. Efforts were made to edit the dictations but occasionally words are mis-transcribed.)     Electronically signed by ALEX De León CNP on 12/1/20 at 9:38 AM EST

## 2020-12-03 RX ORDER — SENNOSIDES 25 MG/1
1 TABLET, FILM COATED ORAL DAILY
Qty: 90 TABLET | Refills: 5 | Status: SHIPPED | OUTPATIENT
Start: 2020-12-03 | End: 2021-04-05

## 2020-12-03 RX ORDER — LACTOBACILLUS RHAMNOSUS GG 10B CELL
1 CAPSULE ORAL DAILY
Qty: 90 TABLET | Refills: 5 | Status: SHIPPED | OUTPATIENT
Start: 2020-12-03 | End: 2021-04-05

## 2020-12-03 RX ORDER — OMEGA-3S/DHA/EPA/FISH OIL 1000-1400
4 CAPSULE,DELAYED RELEASE (ENTERIC COATED) ORAL DAILY
Qty: 90 TABLET | Refills: 11 | Status: SHIPPED | OUTPATIENT
Start: 2020-12-03 | End: 2020-12-03

## 2020-12-03 RX ORDER — CALCIUM CITRATE/VITAMIN D3 200MG-6.25
1 TABLET ORAL DAILY
Qty: 90 TABLET | Refills: 5 | Status: SHIPPED | OUTPATIENT
Start: 2020-12-03 | End: 2021-04-05

## 2020-12-03 RX ORDER — CALCIUM CITRATE/VITAMIN D3 200MG-6.25
1 TABLET ORAL DAILY
Qty: 90 TABLET | Refills: 5 | Status: SHIPPED | OUTPATIENT
Start: 2020-12-03 | End: 2020-12-03

## 2020-12-03 RX ORDER — SENNOSIDES 25 MG/1
1 TABLET, FILM COATED ORAL DAILY
Qty: 90 TABLET | Refills: 5 | Status: SHIPPED | OUTPATIENT
Start: 2020-12-03 | End: 2020-12-03

## 2020-12-03 RX ORDER — OMEGA-3S/DHA/EPA/FISH OIL 1000-1400
4 CAPSULE,DELAYED RELEASE (ENTERIC COATED) ORAL DAILY
Qty: 90 TABLET | Refills: 11 | Status: SHIPPED | OUTPATIENT
Start: 2020-12-03 | End: 2021-04-05

## 2020-12-03 RX ORDER — LACTOBACILLUS RHAMNOSUS GG 10B CELL
1 CAPSULE ORAL DAILY
Qty: 90 TABLET | Refills: 5 | Status: SHIPPED | OUTPATIENT
Start: 2020-12-03 | End: 2020-12-03

## 2021-01-01 NOTE — FLOWSHEET NOTE
48  3. Increase strength (pounds):  strength to 35 for increased I with [address]  4. Increase function:  DASH score will be 20% functionally impaired or less  5. Decrease Edema: to same size as L hand (a decrease by .5 mm)  6. Independent with Home Exercise Program in 3 sessions        Long Term Goals: (  12  Treatments)  1. Increase  strength to 50 pounds for increased I with all home tasks  2. Decrease pain to 0/10 with all home tasks           Pt. Education:  Yes  Method of Education: Verbal and Written  Comprehension of Education: Yes      Plan:  Continue with current plan.             Time In/Out: 4930-0173 co tx   Total Treatment Time:   47      Min      Electronically signed by:  BIPIN Richey/IZZY getting circ

## 2021-02-09 ENCOUNTER — TELEPHONE (OUTPATIENT)
Dept: OBGYN CLINIC | Age: 31
End: 2021-02-09

## 2021-03-02 ENCOUNTER — TELEMEDICINE (OUTPATIENT)
Dept: FAMILY MEDICINE CLINIC | Age: 31
End: 2021-03-02
Payer: OTHER GOVERNMENT

## 2021-03-02 DIAGNOSIS — H66.91 RIGHT OTITIS MEDIA, UNSPECIFIED OTITIS MEDIA TYPE: Primary | ICD-10-CM

## 2021-03-02 DIAGNOSIS — H92.01 RIGHT EAR PAIN: ICD-10-CM

## 2021-03-02 DIAGNOSIS — T36.95XA ANTIBIOTIC-INDUCED YEAST INFECTION: ICD-10-CM

## 2021-03-02 DIAGNOSIS — B37.9 ANTIBIOTIC-INDUCED YEAST INFECTION: ICD-10-CM

## 2021-03-02 PROBLEM — B97.7 HUMAN PAPILLOMA VIRUS (HPV) INFECTION: Status: ACTIVE | Noted: 2021-03-02

## 2021-03-02 PROCEDURE — 99212 OFFICE O/P EST SF 10 MIN: CPT | Performed by: NURSE PRACTITIONER

## 2021-03-02 RX ORDER — AMOXICILLIN 500 MG/1
500 CAPSULE ORAL 2 TIMES DAILY
Qty: 14 CAPSULE | Refills: 0 | Status: SHIPPED | OUTPATIENT
Start: 2021-03-02 | End: 2021-03-09

## 2021-03-02 RX ORDER — FLUCONAZOLE 150 MG/1
150 TABLET ORAL
Qty: 2 TABLET | Refills: 1 | Status: SHIPPED | OUTPATIENT
Start: 2021-03-02 | End: 2021-04-05 | Stop reason: ALTCHOICE

## 2021-03-02 SDOH — ECONOMIC STABILITY: TRANSPORTATION INSECURITY
IN THE PAST 12 MONTHS, HAS LACK OF TRANSPORTATION KEPT YOU FROM MEETINGS, WORK, OR FROM GETTING THINGS NEEDED FOR DAILY LIVING?: NOT ASKED

## 2021-03-02 SDOH — ECONOMIC STABILITY: TRANSPORTATION INSECURITY
IN THE PAST 12 MONTHS, HAS THE LACK OF TRANSPORTATION KEPT YOU FROM MEDICAL APPOINTMENTS OR FROM GETTING MEDICATIONS?: NOT ASKED

## 2021-03-02 SDOH — ECONOMIC STABILITY: FOOD INSECURITY: WITHIN THE PAST 12 MONTHS, THE FOOD YOU BOUGHT JUST DIDN'T LAST AND YOU DIDN'T HAVE MONEY TO GET MORE.: NEVER TRUE

## 2021-03-02 ASSESSMENT — ENCOUNTER SYMPTOMS
ABDOMINAL PAIN: 0
DIARRHEA: 0
COUGH: 0
SORE THROAT: 0
RESPIRATORY NEGATIVE: 1
SINUS PRESSURE: 0
SHORTNESS OF BREATH: 0

## 2021-03-02 NOTE — PROGRESS NOTES
[facility]  [facility]  [address]  [address]  Dept: [phone]    3/2/2021    TELEHEALTH EVALUATION -- Audio/Visual (During MUFJG-92 public health emergency)  Smitha Kline (:  1990) has requested an audio/video evaluation for the following concern(s):    HPI:  Appointment to discuss right ear pressure     Right ear pressure and pain- noted 1 week ago. Denies fevers. She is wondering if it's related to TMJ     Ear Fullness   There is pain in the right ear. This is a new problem. The current episode started in the past 7 days. The problem occurs constantly. The problem has been unchanged. There has been no fever. Pertinent negatives include no abdominal pain, coughing, diarrhea, headaches, hearing loss (Unchanged. Known right sided hearing loss ), neck pain, rash or sore throat. She has tried nothing for the symptoms. The treatment provided no relief. Her past medical history is significant for hearing loss (known right sided hearing loss ). There is no history of a chronic ear infection or a tympanostomy tube. Patient-Reported Vitals 3/2/2021   Patient-Reported Weight (No Data)   Patient-Reported Height 5 2   Patient-Reported Systolic (No Data)   Patient-Reported Diastolic (No Data)   Patient-Reported Pulse (No Data)   Patient-Reported Temperature (No Data)   Patient-Reported SpO2 (No Data)   Patient-Reported Peak Flow (No Data)        There were no vitals filed for this visit. Wt Readings from Last 3 Encounters:   20 158 lb (71.7 kg)   20 147 lb 9.6 oz (67 kg)   20 138 lb (62.6 kg)     BP Readings from Last 3 Encounters:   20 120/80   20 110/77   20 110/70       REVIEW OF SYSTEMS:   Review of Systems   Constitutional: Negative. Negative for chills, fatigue and fever. HENT: Positive for ear pain. Negative for hearing loss (Unchanged.  Known right sided hearing loss ), sinus pressure and sore throat. Respiratory: Negative. Negative for cough and shortness of breath. Cardiovascular: Negative. Negative for chest pain and palpitations. Gastrointestinal: Negative for abdominal pain and diarrhea. Musculoskeletal: Negative for neck pain. Skin: Negative for rash. Neurological: Positive for light-headedness. Negative for headaches.  Dizziness: 1 occurance        PAST MEDICAL HISTORY:    Past Medical History:   Diagnosis Date    Anxiety     Chronic low back pain     Chronic low back pain     Depression     Endometriosis     Fibromyalgia 2/5/2020    HPV (human papilloma virus) anogenital infection     Hyperhidrosis     PTSD (post-traumatic stress disorder)        FAMILY HISTORY:    Family History   Problem Relation Age of Onset    Depression Mother     Anxiety Disorder Mother     Depression Father     Ovarian Cancer Maternal Aunt         great aunt     ADHD Brother     Other Brother         autism     Osteoarthritis Maternal Grandmother     Cancer Maternal Grandfather         skin    COPD Maternal Grandfather     Lung Cancer Maternal Grandfather     ADHD Brother     Depression Brother     No Known Problems Paternal Grandmother     Colon Cancer Paternal Grandfather         COD    Diabetes type 2  Maternal Aunt     ADHD Son     Other Son         ODD        SOCIAL HISTORY:    Social History     Socioeconomic History    Marital status:      Spouse name: None    Number of children: 1    Years of education: None    Highest education level: None   Occupational History    Occupation: stay at home mom    Social Needs    Financial resource strain: Not hard at all   [employer] insecurity     Worry: Never true     Inability: Never true   [employer] needs     Medical: None     Non-medical: None   Tobacco Use    Smoking status: Former Smoker     Packs/day: 0.50     Years: 17.00     Pack years: 8.50     Types: Cigarettes     Quit date: 2018     Years since quitting: 3. 1    Smokeless tobacco: Never Used   Substance and Sexual Activity    Alcohol use: Yes     Comment: social     Drug use: No    Sexual activity: Yes     Partners: Male     Birth control/protection: Patch   Lifestyle    Physical activity     Days per week: None     Minutes per session: None    Stress: None   Relationships    Social connections     Talks on phone: None     Gets together: None     Attends Restorationism service: None     Active member of club or organization: None     Attends meetings of clubs or organizations: None     Relationship status: None    Intimate partner violence     Fear of current or ex partner: None     Emotionally abused: None     Physically abused: None     Forced sexual activity: None   Other Topics Concern    None   Social History Narrative    None       SURGICAL HISTORY:    Past Surgical History:   Procedure Laterality Date     SECTION      COLPOSCOPY      , 2011 x 2     ENDOMETRIAL BIOPSY  2020    results were WNL     TONSILLECTOMY      WISDOM TOOTH EXTRACTION         CURRENT MEDICATIONS:    Current Outpatient Medications   Medication Sig Dispense Refill    fluconazole (DIFLUCAN) 150 MG tablet Take 1 tablet by mouth every 72 hours as needed (vaginitis) 2 tablet 1    amoxicillin (AMOXIL) 500 MG capsule Take 1 capsule by mouth 2 times daily for 7 days 14 capsule 0    norelgestromin-ethinyl estradiol (ORTHO EVRA) 150-35 MCG/24HR Place 1 patch onto the skin once a week 9 patch 3    Fiber Adult Gummies 2 g CHEW Take 4 g by mouth daily 90 tablet 11    Multiple Vitamins-Minerals (MULTIVITAMIN GUMMIES WOMENS) CHEW Take 1 tablet by mouth daily 90 tablet 5    Probiotic Product (CULTURELLE PROBIOTICS) CHEW Take 1 tablet by mouth daily 90 tablet 5    Probiotic Product (RA PROBIOTIC GUMMIES) CHEW Take 1 tablet by mouth daily 90 tablet 5    levonorgestrel-ethinyl estradiol (LEVORA 0.15/30, 28,) 0.15-30 MG-MCG per tablet Take 1 tablet by mouth daily Patient to take active pills continuously and not take the placebo pills, so that she does not have a period. 4 packet 3     No current facility-administered medications for this visit. ALLERGIES:   Allergies   Allergen Reactions    Codeine      Per genetic testing she has found that she over-metabolizes medication     Other Hives     Paprika     Turmeric Hives    Tylenol [Acetaminophen]      Per genetic testing she has found that she over-metabolizes medication     Vicodin [Hydrocodone-Acetaminophen]      Per genetic testing she has found that she over-metabolizes medication        PHYSICAL EXAM:   Physical Exam  Vitals signs reviewed. Constitutional:       General: She is not in acute distress. Appearance: Normal appearance. She is well-developed. She is not ill-appearing or toxic-appearing. HENT:      Head: Normocephalic. Right Ear: Hearing normal.      Left Ear: Hearing normal.      Mouth/Throat:      Lips: Pink. Eyes:      General: Lids are normal.      Conjunctiva/sclera: Conjunctivae normal.   Neck:      Musculoskeletal: Normal range of motion. Pulmonary:      Effort: Pulmonary effort is normal. No respiratory distress. Musculoskeletal: Normal range of motion. Skin:     Findings: No rash. Neurological:      Mental Status: She is alert and oriented to person, place, and time. Mental status is at baseline. Coordination: Coordination is intact. Psychiatric:         Mood and Affect: Mood normal.         Behavior: Behavior normal. Behavior is cooperative. Thought Content: Thought content normal.         Judgment: Judgment normal.          ASSESSMENT/PLAN:  1. Right otitis media, unspecified otitis media type  2. Right ear pain  -     amoxicillin (AMOXIL) 500 MG capsule;  Take 1 capsule by mouth 2 times daily for 7 days, Disp-14 capsule, R-0Normal  -Antibiotic to completion, continue probiotics daily, notify office if symptoms worsen or fail to improve and take Diflucan for yeast infection if needed    3. Antibiotic-induced yeast infection  -     fluconazole (DIFLUCAN) 150 MG tablet; Take 1 tablet by mouth every 72 hours as needed (vaginitis), Disp-2 tablet, R-1Normal       Return if symptoms worsen or fail to improve. Kar Ahumada is a 26 y.o. female being evaluated by a Virtual Visit (video visit) encounter to address concerns as mentioned above. A caregiver was present when appropriate. Due to this being a TeleHealth encounter (During [facility] public [facility] emergency), evaluation of the following organ systems was limited: Vitals/Constitutional/EENT/Resp/CV/GI//MS/Neuro/Skin/Heme-Lymph-Imm. Pursuant to the emergency declaration under the [address] [place] [address] authority and the [employer] Act, this Virtual Visit was conducted with patient's (and/or legal guardian's) consent, to reduce the patient's risk of exposure to COVID-19 and provide necessary medical care. The patient (and/or legal guardian) has also been advised to contact this office for worsening conditions or problems, and seek emergency medical treatment and/or call 911 if deemed necessary. Services were provided through a video synchronous discussion virtually to substitute for in-person clinic visit. Patient and provider were located at their individual homes. --ALEX Vila - CNP on 3/2/2021 at 12:49 PM    (Please note that portions of this note were completed with a voice recognition program. Efforts were made to edit the dictations but occasionally words are mis-transcribed. )      An electronic signature was used to authenticate this note.

## 2021-04-05 ENCOUNTER — OFFICE VISIT (OUTPATIENT)
Dept: FAMILY MEDICINE CLINIC | Age: 31
End: 2021-04-05
Payer: OTHER GOVERNMENT

## 2021-04-05 VITALS
DIASTOLIC BLOOD PRESSURE: 78 MMHG | HEIGHT: 62 IN | WEIGHT: 153 LBS | OXYGEN SATURATION: 99 % | HEART RATE: 78 BPM | RESPIRATION RATE: 18 BRPM | BODY MASS INDEX: 28.16 KG/M2 | TEMPERATURE: 97.5 F | SYSTOLIC BLOOD PRESSURE: 118 MMHG

## 2021-04-05 DIAGNOSIS — K59.01 SLOW TRANSIT CONSTIPATION: ICD-10-CM

## 2021-04-05 DIAGNOSIS — G89.29 CHRONIC LOW BACK PAIN, UNSPECIFIED BACK PAIN LATERALITY, UNSPECIFIED WHETHER SCIATICA PRESENT: ICD-10-CM

## 2021-04-05 DIAGNOSIS — F41.9 ANXIETY AND DEPRESSION: Primary | ICD-10-CM

## 2021-04-05 DIAGNOSIS — K21.9 GASTROESOPHAGEAL REFLUX DISEASE WITHOUT ESOPHAGITIS: ICD-10-CM

## 2021-04-05 DIAGNOSIS — M54.50 CHRONIC LOW BACK PAIN, UNSPECIFIED BACK PAIN LATERALITY, UNSPECIFIED WHETHER SCIATICA PRESENT: ICD-10-CM

## 2021-04-05 DIAGNOSIS — F32.A ANXIETY AND DEPRESSION: Primary | ICD-10-CM

## 2021-04-05 PROCEDURE — 99214 OFFICE O/P EST MOD 30 MIN: CPT | Performed by: NURSE PRACTITIONER

## 2021-04-05 ASSESSMENT — ENCOUNTER SYMPTOMS
VOMITING: 0
DIARRHEA: 0
ABDOMINAL PAIN: 0
NAUSEA: 0
EYES NEGATIVE: 1
BACK PAIN: 0
COUGH: 0
CONSTIPATION: 1
RESPIRATORY NEGATIVE: 1
SHORTNESS OF BREATH: 0

## 2021-04-05 ASSESSMENT — PATIENT HEALTH QUESTIONNAIRE - PHQ9
1. LITTLE INTEREST OR PLEASURE IN DOING THINGS: 1
SUM OF ALL RESPONSES TO PHQ QUESTIONS 1-9: 2
SUM OF ALL RESPONSES TO PHQ QUESTIONS 1-9: 2
2. FEELING DOWN, DEPRESSED OR HOPELESS: 1
SUM OF ALL RESPONSES TO PHQ9 QUESTIONS 1 & 2: 2

## 2021-04-05 NOTE — PROGRESS NOTES
[facility]  [facility]  [address]  [facility] [address]  Dept: [phone]    4/5/2021    CHIEF COMPLAINT    Chief Complaint   Patient presents with    Gastroesophageal Reflux    Depression    Anxiety    Weight Loss     something other than just watching her food intake       HPI    Kaila Cornejo is a 33 y.o. female who presents   Chief Complaint   Patient presents with    Gastroesophageal Reflux    Depression    Anxiety    Weight Loss     something other than just watching her food intake     Appointment to f/u on Anxiety/Depression    Anxiety/Depression- she notes that she is in counseling which is helpful. She has started making bracelets again, but is finding it boring at this point. She is working out 5 days per week for 15-20 minutes at a time. She is working on building it up. She notes that she is getting winded or noting her muscles are shaking when she tries to increase too fast.   She will be seeing a new counselor on Thursday which is hopefully going to be a better fit. Will be seeing someone at [facility]    GERD- has gotten somewhat better. She is working on dietary modification. She continues taking Prilosec as need. She has noted that the 8 lbs. Weight concerns- she is frustrated with weight loss being slow. Smoking cessation - quit smoking 1 year ago. No longer vapes or smokes cigarettes. Constipation-improving. She is drinking ensure protein shakes in the AM with fiber in it, she is drinking more water, she is eating more fruits and veggies and has increased her activity.      Vitals:    04/05/21 1030   BP: 118/78   Site: Left Upper Arm   Position: Sitting   Cuff Size: Medium Adult   Pulse: 78   Resp: 18   Temp: 97.5 °F (36.4 °C)   TempSrc: Temporal   SpO2: 99%   Weight: 153 lb (69.4 kg)   Height: 5' 2\" (1.575 m)       Wt Readings from Last 3 Encounters:   04/05/21 153 lb (69.4 kg)   09/11/20 158 lb (71.7 kg)   06/19/20 147 lb 9.6 oz (67 kg)     BP Readings from Last 3 Encounters:   04/05/21 118/78   09/11/20 120/80   06/19/20 110/77       REVIEW OF SYSTEMS    Review of Systems   Constitutional: Negative. Negative for appetite change, chills, fatigue and fever. HENT: Negative. Eyes: Negative. Negative for visual disturbance. Respiratory: Negative. Negative for cough and shortness of breath. Cardiovascular: Negative. Negative for chest pain and palpitations. Gastrointestinal: Positive for constipation. Negative for abdominal pain, diarrhea, nausea and vomiting. Improved constipation with increased activity. Genitourinary: Negative. Negative for difficulty urinating, flank pain and hematuria. Musculoskeletal: Negative. Negative for back pain. Skin: Negative. Negative for rash. Neurological: Negative for dizziness and headaches. Hematological: Negative. Negative for adenopathy. Psychiatric/Behavioral: Negative. Negative for dysphoric mood, self-injury and suicidal ideas. The patient is not nervous/anxious.         PAST MEDICAL HISTORY    Past Medical History:   Diagnosis Date    Anxiety     Chronic low back pain     Chronic low back pain     Depression     Endometriosis     Fibromyalgia 2/5/2020    Gastroesophageal reflux disease without esophagitis 4/25/2021    HPV (human papilloma virus) anogenital infection     Hyperhidrosis     PTSD (post-traumatic stress disorder)        FAMILY HISTORY    Family History   Problem Relation Age of Onset    Depression Mother     Anxiety Disorder Mother     Depression Father     Ovarian Cancer Maternal Aunt         great aunt     ADHD Brother     Other Brother         autism     Osteoarthritis Maternal Grandmother     Cancer Maternal Grandfather         skin    COPD Maternal Grandfather     Lung Cancer Maternal Grandfather     ADHD Brother     Depression Brother     No Known Problems Paternal Grandmother     Colon Cancer Paternal Grandfather         COD    Diabetes type 2  Maternal Aunt     ADHD Son     Other Son         ODD        SOCIAL HISTORY    Social History     Socioeconomic History    Marital status:      Spouse name: None    Number of children: 1    Years of education: None    Highest education level: None   Occupational History    Occupation: stay at home mom    Social Needs    Financial resource strain: Not hard at all   [place] insecurity     Worry: Never true     Inability: Never true   [employer] needs     Medical: None     Non-medical: None   Tobacco Use    Smoking status: Former Smoker     Packs/day: 0.50     Years: 17.00     Pack years: 8.50     Types: Cigarettes     Quit date:      Years since quitting: 3.3    Smokeless tobacco: Former User     Quit date: 2020   Substance and Sexual Activity    Alcohol use: Yes     Comment: social     Drug use: No    Sexual activity: Yes     Partners: Male     Birth control/protection: Patch   Lifestyle    Physical activity     Days per week: None     Minutes per session: None    Stress: None   Relationships    Social connections     Talks on phone: None     Gets together: None     Attends Sabianist service: None     Active member of club or organization: None     Attends meetings of clubs or organizations: None     Relationship status: None    Intimate partner violence     Fear of current or ex partner: None     Emotionally abused: None     Physically abused: None     Forced sexual activity: None   Other Topics Concern    None   Social History Narrative    None       SURGICAL HISTORY    Past Surgical History:   Procedure Laterality Date     SECTION      COLPOSCOPY      , 2011 x 2     ENDOMETRIAL BIOPSY  2020    results were WNL     TONSILLECTOMY      WISDOM TOOTH EXTRACTION         CURRENT MEDICATIONS    No current outpatient medications on file.      No current facility-administered medications for this visit. ALLERGIES    Allergies   Allergen Reactions    Codeine      Per genetic testing she has found that she over-metabolizes medication     Other Hives     Paprika     Turmeric Hives    Tylenol [Acetaminophen]      Per genetic testing she has found that she over-metabolizes medication     Vicodin [Hydrocodone-Acetaminophen]      Per genetic testing she has found that she over-metabolizes medication        PHYSICAL EXAM   Physical Exam  Vitals signs and nursing note reviewed. Constitutional:       General: She is not in acute distress. Appearance: She is well-developed. She is not diaphoretic. Interventions: Face mask in place. HENT:      Head: Normocephalic. Right Ear: Hearing normal.      Left Ear: Hearing normal.   Eyes:      General:         Right eye: No discharge. Left eye: No discharge. Pupils: Pupils are equal.   Neck:      Musculoskeletal: Normal range of motion. Cardiovascular:      Rate and Rhythm: Normal rate and regular rhythm. Pulses: Normal pulses. Radial pulses are 2+ on the right side and 2+ on the left side. Heart sounds: Normal heart sounds, S1 normal and S2 normal. No murmur. Pulmonary:      Effort: Pulmonary effort is normal. No respiratory distress. Breath sounds: Normal breath sounds. No wheezing. Skin:     General: Skin is warm and dry. Neurological:      Mental Status: She is alert and oriented to person, place, and time. Psychiatric:         Behavior: Behavior normal. Behavior is cooperative. ASSESSMENT/PLAN  1. Anxiety and depression  Assessment & Plan:  Chronic. Stable. Encouraged to continued counseling, self-care, increased physical activity and notify office if symptoms worsen or fail to improve.        2. Chronic low back pain, unspecified back pain laterality, unspecified whether sciatica present  Assessment & Plan:   Well-controlled, lifestyle modifications recommended, over-the-counter pain relievers, utilizing heat and ice as needed and notify office if symptoms worsen or fail to improve with flareups. Discussed regular activity and stretching being components of preventative therapy for back health  3. Slow transit constipation  Assessment & Plan:   Well-controlled, Continue drinking plenty of water, increase fiber, eating more fruits and vegetables and maintain current level of activity. 4. BMI 27.0-27.9,adult  Assessment & Plan:   Encouraged patient to continue increased physical activity and healthy eating habits. Discussed that weight loss of 1 pound per week is adequate and that her BMI although elevated is not in the obese category. 5. Gastroesophageal reflux disease without esophagitis  Assessment & Plan:   Well-controlled, continue current medications to include Prilosec as needed. Discussed dietary and activity modifications to provide additional reduction in GERD symptoms. I have spent 35 minutes face to face with the patient more than 50 % if this time was spent counseling and coordinating care. Imelda Zack received counseling on the following healthy behaviors: nutrition, exercise and medication adherence  Reviewed prior labs and health maintenance  Continue current medications, diet and exercise. Discussed use, benefit, and side effects of prescribed medications. Barriers to medication compliance addressed. Patient given educational materials - see patient instructions  Was a self-tracking handout given in paper form or via [employer]? Yes    Requested Prescriptions      No prescriptions requested or ordered in this encounter       All patient questions answered. Patient voiced understanding. Quality Measures    Body mass index is 27.98 kg/m². Elevated. Weight control planned discussed Healthy diet and regular exercise. BP: 118/78 Blood pressure is normal. Treatment plan consists of No treatment change needed.     Lab Results   Component Value Date    LDLCHOLESTEROL 80 02/27/2019    (goal LDL reduction with dx if diabetes is 50% LDL reduction)      PHQ Scores 4/5/2021 4/30/2019 2/27/2019 2/21/2018 2/14/2018   PHQ2 Score 2 0 0 4 2   PHQ9 Score 2 0 0 16 2     Interpretation of Total Score Depression Severity: 1-4 = Minimal depression, 5-9 = Mild depression, 10-14 = Moderate depression, 15-19 = Moderately severe depression, 20-27 = Severe depression     Return in about 2 months (around 6/5/2021) for Anxiety, depression.     (Please note that portions of this note were completed with a voice recognition program. Efforts were made to edit the dictations but occasionally words are mis-transcribed.)      Electronically signed by ALEX Veliz CNP on 4/5/21 at 10:43 AM CARLOST

## 2021-04-05 NOTE — PROGRESS NOTES
Depression screening done  Chief Complaint   Patient presents with    Gastroesophageal Reflux    Depression    Anxiety    Weight Loss     something other than just watching her food intake

## 2021-04-25 PROBLEM — K21.9 GASTROESOPHAGEAL REFLUX DISEASE WITHOUT ESOPHAGITIS: Status: ACTIVE | Noted: 2021-04-25

## 2021-04-25 NOTE — ASSESSMENT & PLAN NOTE
Well-controlled, continue current medications to include Prilosec as needed. Discussed dietary and activity modifications to provide additional reduction in GERD symptoms.

## 2021-04-25 NOTE — ASSESSMENT & PLAN NOTE
Well-controlled, Continue drinking plenty of water, increase fiber, eating more fruits and vegetables and maintain current level of activity.

## 2021-04-25 NOTE — ASSESSMENT & PLAN NOTE
Encouraged patient to continue increased physical activity and healthy eating habits. Discussed that weight loss of 1 pound per week is adequate and that her BMI although elevated is not in the obese category.

## 2021-04-25 NOTE — ASSESSMENT & PLAN NOTE
Chronic. Stable. Encouraged to continued counseling, self-care, increased physical activity and notify office if symptoms worsen or fail to improve.

## 2021-04-25 NOTE — ASSESSMENT & PLAN NOTE
Well-controlled, lifestyle modifications recommended, over-the-counter pain relievers, utilizing heat and ice as needed and notify office if symptoms worsen or fail to improve with flareups.   Discussed regular activity and stretching being components of preventative therapy for back health

## 2021-06-01 ENCOUNTER — NURSE TRIAGE (OUTPATIENT)
Dept: OTHER | Facility: CLINIC | Age: 31
End: 2021-06-01

## 2021-06-04 ENCOUNTER — OFFICE VISIT (OUTPATIENT)
Dept: FAMILY MEDICINE CLINIC | Age: 31
End: 2021-06-04
Payer: OTHER GOVERNMENT

## 2021-06-04 VITALS
HEIGHT: 62 IN | RESPIRATION RATE: 18 BRPM | DIASTOLIC BLOOD PRESSURE: 76 MMHG | OXYGEN SATURATION: 99 % | HEART RATE: 79 BPM | BODY MASS INDEX: 28.85 KG/M2 | SYSTOLIC BLOOD PRESSURE: 118 MMHG | TEMPERATURE: 97.6 F | WEIGHT: 156.8 LBS

## 2021-06-04 DIAGNOSIS — F32.A ANXIETY AND DEPRESSION: Primary | ICD-10-CM

## 2021-06-04 DIAGNOSIS — M77.01 MEDIAL EPICONDYLITIS OF ELBOW, RIGHT: ICD-10-CM

## 2021-06-04 DIAGNOSIS — K59.01 SLOW TRANSIT CONSTIPATION: ICD-10-CM

## 2021-06-04 DIAGNOSIS — K21.9 GASTROESOPHAGEAL REFLUX DISEASE WITHOUT ESOPHAGITIS: ICD-10-CM

## 2021-06-04 DIAGNOSIS — M79.644 PAIN OF RIGHT MIDDLE FINGER: ICD-10-CM

## 2021-06-04 DIAGNOSIS — F41.9 ANXIETY AND DEPRESSION: Primary | ICD-10-CM

## 2021-06-04 PROBLEM — H04.129 TEAR FILM INSUFFICIENCY: Status: ACTIVE | Noted: 2021-06-04

## 2021-06-04 PROBLEM — F90.9 ATTENTION-DEFICIT HYPERACTIVITY DISORDER: Status: ACTIVE | Noted: 2021-06-04

## 2021-06-04 PROBLEM — R87.619 ABNORMAL PAP SMEAR OF CERVIX: Status: ACTIVE | Noted: 2021-06-04

## 2021-06-04 PROBLEM — F51.05 INSOMNIA RELATED TO ANOTHER MENTAL DISORDER: Status: ACTIVE | Noted: 2021-06-04

## 2021-06-04 PROCEDURE — 99214 OFFICE O/P EST MOD 30 MIN: CPT | Performed by: NURSE PRACTITIONER

## 2021-06-04 ASSESSMENT — ENCOUNTER SYMPTOMS
DIARRHEA: 0
CONSTIPATION: 1
ABDOMINAL PAIN: 0
RESPIRATORY NEGATIVE: 1
SHORTNESS OF BREATH: 0
EYES NEGATIVE: 1
BACK PAIN: 0
NAUSEA: 0
VOMITING: 0
COUGH: 0

## 2021-06-04 ASSESSMENT — PATIENT HEALTH QUESTIONNAIRE - PHQ9
SUM OF ALL RESPONSES TO PHQ QUESTIONS 1-9: 2
SUM OF ALL RESPONSES TO PHQ9 QUESTIONS 1 & 2: 2
1. LITTLE INTEREST OR PLEASURE IN DOING THINGS: 1
SUM OF ALL RESPONSES TO PHQ QUESTIONS 1-9: 2
SUM OF ALL RESPONSES TO PHQ QUESTIONS 1-9: 2
2. FEELING DOWN, DEPRESSED OR HOPELESS: 1

## 2021-06-04 NOTE — PROGRESS NOTES
Depression screening done  Chief Complaint   Patient presents with    Depression    Anxiety    Finger Pain     rt hand middle finger knuckle and right elbow -x 3 weeks

## 2021-06-04 NOTE — ASSESSMENT & PLAN NOTE
Uncontrolled, advised to take ibuprofen as needed for pain, ice and protected with finger splint as needed. By office if symptoms worsen or fail to improve.

## 2021-06-04 NOTE — PROGRESS NOTES
[facility]  [facility]  [address]  [facility] [address]  Dept: [phone]    6/4/2021    CHIEF COMPLAINT    Chief Complaint   Patient presents with    Depression    Anxiety    Finger Pain     rt hand middle finger knuckle and right elbow -x 3 weeks       HPI    Tonya Brown is a 33 y.o. female who presents   Chief Complaint   Patient presents with    Depression    Anxiety    Finger Pain     rt hand middle finger knuckle and right elbow -x 3 weeks   . Appointment for f/u on Anxiety and depression. Anxiety/Depression- going to counseling which is still very helpful. The counselor is helping her better manage her son's ODD and ADHD. Her son is also attending counseling with the same counselor which she finds to be beneficial.  Is noting that exercise is making her more depressed lately so she has stopped exercising as frequently. Working on self-care. Declines need for any medication at this time. Right elbow pain-noted after painting a canvas for several hours. She indicates that the pain began 2 weeks ago and has just recently started improving. She is right-handed. Pre-existing pressure in her elbows that she will snap her arm open and cause it to \"pop\". Attempted to do this after the pain in her right elbow was eliciting severe pain. Right middle finger painno known injury. Noting swelling and ecchymosis. Noting continued improvement in constipation and GERD with dietary modifications.       Vitals:    06/04/21 0801   BP: 118/76   Site: Left Upper Arm   Position: Sitting   Cuff Size: Medium Adult   Pulse: 79   Resp: 18   Temp: 97.6 °F (36.4 °C)   TempSrc: Temporal   SpO2: 99%   Weight: 156 lb 12.8 oz (71.1 kg)   Height: 5' 2\" (1.575 m)       Wt Readings from Last 3 Encounters:   06/04/21 156 lb 12.8 oz (71.1 kg)   04/05/21 153 lb (69.4 kg)   09/11/20 158 lb (71.7 kg)     BP Readings from Last 3 Encounters:   06/04/21 118/76   04/05/21 118/78   09/11/20 120/80       REVIEW OF SYSTEMS    Review of Systems   Constitutional: Negative. Negative for appetite change, chills, fatigue and fever. HENT: Negative. Eyes: Negative. Negative for visual disturbance. Respiratory: Negative. Negative for cough and shortness of breath. Cardiovascular: Negative. Negative for chest pain and palpitations. Gastrointestinal: Positive for constipation. Negative for abdominal pain, diarrhea, nausea and vomiting. See HPI   Genitourinary: Negative. Negative for difficulty urinating. Musculoskeletal: Positive for arthralgias ( Right elbow and middle finger pain). Negative for back pain. Skin: Negative. Negative for rash. Neurological: Negative. Negative for dizziness and headaches. Psychiatric/Behavioral: Negative. Negative for dysphoric mood ( See HPI), self-injury and suicidal ideas. The patient is not nervous/anxious ( See HPI).         PAST MEDICAL HISTORY    Past Medical History:   Diagnosis Date    Anxiety     Attention-deficit hyperactivity disorder 6/4/2021    Chronic low back pain     Chronic low back pain     Depression     Endometriosis     Fibromyalgia 2/5/2020    Gastroesophageal reflux disease without esophagitis 4/25/2021    HPV (human papilloma virus) anogenital infection     Hyperhidrosis     PTSD (post-traumatic stress disorder)        FAMILY HISTORY    Family History   Problem Relation Age of Onset    Depression Mother     Anxiety Disorder Mother     Depression Father     Ovarian Cancer Maternal Aunt         great aunt     ADHD Brother     Other Brother         autism     Osteoarthritis Maternal Grandmother     Cancer Maternal Grandfather         skin    COPD Maternal Grandfather     Lung Cancer Maternal Grandfather     ADHD Brother     Depression Brother     No Known Problems Paternal Grandmother     Colon Cancer Paternal Grandfather         COD    Diabetes type 2  Maternal Aunt  ADHD Son     Other Son         ODD        SOCIAL HISTORY    Social History     Socioeconomic History    Marital status:      Spouse name: None    Number of children: 1    Years of education: None    Highest education level: None   Occupational History    Occupation: stay at home mom    Tobacco Use    Smoking status: Former Smoker     Packs/day: 0.50     Years: 17.00     Pack years: 8.50     Types: Cigarettes     Quit date:      Years since quitting: 3.4    Smokeless tobacco: Former User     Quit date: 2020   Vaping Use    Vaping Use: Former    Quit date: 3/25/2020    Substances: Always   Substance and Sexual Activity    Alcohol use: Yes     Comment: social     Drug use: No    Sexual activity: Yes     Partners: Male     Birth control/protection: Patch   Other Topics Concern    None   Social History Narrative    None     Social Determinants of Health     Financial Resource Strain: Low Risk     Difficulty of Paying Living Expenses: Not hard at all   Food Insecurity: No Food Insecurity    Worried About [address] in the Last Year: Never true    [facility] [employer] in the Last Year: Never true   Transportation Needs:     Lack of Transportation (Medical):      Lack of Transportation (Non-Medical):    Physical Activity:     Days of Exercise per Week:     Minutes of Exercise per Session:    Stress:     Feeling of Stress :    Social Connections:     Frequency of Communication with Friends and Family:     Frequency of Social Gatherings with Friends and Family:     Attends Buddhist Services:     Active Member of Clubs or Organizations:     Attends Club or Organization Meetings:     Marital Status:    Intimate Partner Violence:     Fear of Current or Ex-Partner:     Emotionally Abused:     Physically Abused:     Sexually Abused:        SURGICAL HISTORY    Past Surgical History:   Procedure Laterality Date     SECTION  2008    COLPOSCOPY      , 2011 x 2     Stable. Encouraged to continued counseling, self-care and to notify office if symptoms worsen or fail to improve. Patient encouraged to try increasing physical activity again in the future, but to hold off at this time due to increased self-deprecating thoughts lately. 2. Slow transit constipation  Assessment & Plan:   Well-controlled, Continue drinking plenty of water, increase fiber, eating more fruits and vegetables and maintain current level of activity. 3. Medial epicondylitis of elbow, right  Assessment & Plan:   Borderline controlled, Improving slowly. Advised patient to consider getting a counterforce/counter point brace, resting, icing, taking ibuprofen 400 to 800 mg every 8 hours with food as needed and perform exercises as provided in AVS.  4. Pain of right middle finger  Assessment & Plan:   Uncontrolled, advised to take ibuprofen as needed for pain, ice and protected with finger splint as needed. By office if symptoms worsen or fail to improve. 5. Gastroesophageal reflux disease without esophagitis  Assessment & Plan:   Well-controlled, continue current medications to include Prilosec as needed. Discussed dietary and activity modifications to provide additional reduction in GERD symptoms. Juli Vizcaino received counseling on the following healthy behaviors: nutrition, exercise, medication adherence and tobacco cessation  Reviewed prior labs and health maintenance  Continue current medications, diet and exercise. Discussed use, benefit, and side effects of prescribed medications. Barriers to medication compliance addressed. Patient given educational materials - see patient instructions  Was a self-tracking handout given in paper form or via Memrisehart? Yes    Requested Prescriptions      No prescriptions requested or ordered in this encounter       All patient questions answered. Patient voiced understanding. Quality Measures    Body mass index is 28.68 kg/m². Elevated.  Weight control

## 2021-06-04 NOTE — PATIENT INSTRUCTIONS
Patient Education        Golsaturnino's Elbow: Exercises  Introduction  Here are some examples of exercises for you to try. The exercises may be suggested for a condition or for rehabilitation. Start each exercise slowly. Ease off the exercises if you start to have pain. You will be told when to start these exercises and which ones will work best for you. How to do the exercises  Wrist extensor stretch   1. Extend your affected arm in front of you and make a fist with your palm facing down. 2. Bend your wrist so that your fist points toward the floor. 3. With your other hand, gently bend your wrist farther until you feel a mild to moderate stretch in your forearm. 4. Hold for at least 15 to 30 seconds. 5. Repeat 2 to 4 times. 6. Repeat steps 1 through 5 with your fingers pointing toward the floor. Forearm extensor stretch   1. Place your affected elbow down at your side, bent at about 90 degrees. Then make a fist with your palm facing down. 2. Keeping your wrist bent, slowly straighten your elbow so your arm is down at your side. Then twist your fist out so your palm is facing out to the side and you feel a stretch. 3. Hold for at least 15 to 30 seconds. 4. Repeat 2 to 4 times. Wrist flexor stretch   1. Extend your affected arm in front of you with your palm facing away from your body. 2. Bend back your wrist, pointing your hand up toward the ceiling. 3. With your other hand, gently bend your wrist farther until you feel a mild to moderate stretch in your forearm. 4. Hold for at least 15 to 30 seconds. 5. Repeat 2 to 4 times. 6. Repeat steps 1 through 5, but this time extend your affected arm in front of you with your palm facing up. Then bend back your wrist, pointing your hand toward the floor. Wrist curls   1. Place your forearm on a table with your hand hanging over the edge of the table, palm up. 2. Place a 1- to 2-pound weight in your hand.  This may be a dumbbell, a can of food, or a filled water bottle. 3. Slowly raise and lower the weight while keeping your forearm on the table and your palm facing up. 4. Repeat this motion 8 to 12 times. 5. Switch arms, and do steps 1 through 4.  6. Repeat with your hand facing down toward the floor. Switch arms. Resisted wrist extension   1. Sit leaning forward with your legs slightly spread. Then place your affected forearm on your thigh with your hand and wrist in front of your knee. 2. Grasp one end of an exercise band with your palm down, and step on the other end.  3. Slowly bend your wrist upward for a count of 2, then lower your wrist slowly to a count of 5.  4. Repeat 8 to 12 times. Resisted wrist flexion   1. Sit leaning forward with your legs slightly spread. Then place your affected forearm on your thigh with your hand and wrist in front of your knee. 2. Grasp one end of an exercise band with your palm up, and step on the other end.  3. Slowly bend your wrist upward for a count of 2, then lower your wrist slowly to a count of 5.  4. Repeat 8 to 12 times. Neck stretch to the side   1. This stretch works best if you keep your shoulder down as you lean away from it. To help you remember to do this, start by relaxing your shoulders and lightly holding on to your thighs or your chair. 2. Tilt your head away from your affected elbow and toward your opposite shoulder. For example, if your right elbow is sore, keep your right shoulder down as you lean your head toward your left shoulder. 3. Hold for 15 to 30 seconds. Let the weight of your head stretch your muscles. 4. If you would like a little added stretch, use your hand to gently and steadily pull your head toward your shoulder. For example, if your right elbow is sore, use your left hand to gently pull your head toward your left shoulder. 5. Repeat 2 to 4 times. Resisted forearm pronation   1. Sit leaning forward with your legs slightly spread.  Then place your affected forearm on your thigh with your hand and wrist in front of your knee. 2. Grasp one end of an exercise band with your palm up, and step on the other end. 3. Keeping your wrist straight, roll your palm inward toward your thigh for a count of 2, then slowly move your wrist back to the starting position to a count of 5.  4. Repeat 8 to 12 times. Resisted supination   1. Sit leaning forward with your legs slightly spread. Then place your affected forearm on your thigh with your hand and wrist in front of your knee. 2. Grasp one end of an exercise band with your palm down, and step on the other end. 3. Keeping your wrist straight, roll your palm outward and away from your thigh for a count of 2, then slowly move your wrist back to the starting position to a count of 5.  4. Repeat 8 to 12 times. Follow-up care is a key part of your treatment and safety. Be sure to make and go to all appointments, and call your doctor if you are having problems. It's also a good idea to know your test results and keep a list of the medicines you take. Where can you learn more? Go to https://Evergreen [employer] Estate[employer]Boston Logic.[employer]. org and sign in to your "[employer]" account. Enter [phone] in the [facility] box to learn more about \"Golfer's Elbow: Exercises. \"     If you do not have an account, please click on the \"Sign Up Now\" link. Current as of: November 16, 2020               Content Version: 12.8  © 2006-2021 Healthwise, [facility]. Care instructions adapted under license by [facility] ([facility]). If you have questions about a medical condition or this instruction, always ask your healthcare professional. [place] [address] any warranty or liability for your use of this information. Patient Education        Tennis Elbow: Exercises  Introduction  Here are some examples of exercises for you to try. The exercises may be suggested for a condition or for rehabilitation. Start each exercise slowly.  Ease off the exercises if you start to have pain. You will be told when to start these exercises and which ones will work best for you. How to do the exercises  Wrist flexor stretch   1. Extend your arm in front of you with your palm up. 2. Bend your wrist, pointing your hand toward the floor. 3. With your other hand, gently bend your wrist farther until you feel a mild to moderate stretch in your forearm. 4. Hold for at least 15 to 30 seconds. Repeat 2 to 4 times. Wrist extensor stretch   1. Repeat steps 1 to 4 of the stretch above but begin with your extended hand palm down. Ball or sock squeeze   1. Hold a tennis ball (or a rolled-up sock) in your hand. 2. Make a fist around the ball (or sock) and squeeze. 3. Hold for about 6 seconds, and then relax for up to 10 seconds. 4. Repeat 8 to 12 times. 5. Switch the ball (or sock) to your other hand and do 8 to 12 times. Wrist deviation   1. Sit so that your arm is supported but your hand hangs off the edge of a flat surface, such as a table. 2. Hold your hand out like you are shaking hands with someone. 3. Move your hand up and down. 4. Repeat this motion 8 to 12 times. 5. Switch arms. 6. Try to do this exercise twice with each hand. Wrist curls   1. Place your forearm on a table with your hand hanging over the edge of the table, palm up. 2. Place a 1- to 2-pound weight in your hand. This may be a dumbbell, a can of food, or a filled water bottle. 3. Slowly raise and lower the weight while keeping your forearm on the table and your palm facing up. 4. Repeat this motion 8 to 12 times. 5. Switch arms, and do steps 1 through 4.  6. Repeat with your hand facing down toward the floor. Switch arms. Biceps curls   1. Sit leaning forward with your legs slightly spread and your left hand on your left thigh.   2. Place your right elbow on your right thigh, and hold the weight with your forearm horizontal.  3. Slowly curl the weight up and toward your chest.  4. Repeat this motion 8 to 12 times. 5. Switch arms, and do steps 1 through 4. Follow-up care is a key part of your treatment and safety. Be sure to make and go to all appointments, and call your doctor if you are having problems. It's also a good idea to know your test results and keep a list of the medicines you take. Where can you learn more? Go to https://DajiepepicewBlast Ramp.[employer]. org and sign in to your [employer] account. Enter C958 in the [employer] box to learn more about \"Tennis Elbow: Exercises. \"     If you do not have an account, please click on the \"Sign Up Now\" link. Current as of: November 16, 2020               Content Version: 12.8  © 2006-2021 Healthwise, Incorporated. Care instructions adapted under license by [facility] ([facility]). If you have questions about a medical condition or this instruction, always ask your healthcare professional. [place] [address] any warranty or liability for your use of this information. Patient Education        Hand Arthritis: Exercises  Introduction  Here are some examples of exercises for you to try. The exercises may be suggested for a condition or for rehabilitation. Start each exercise slowly. Ease off the exercises if you start to have pain. You will be told when to start these exercises and which ones will work best for you. How to do the exercises  Tendon glides   5. In this exercise, the steps follow one another to a make a continuous movement. 6. With your affected hand, point your fingers and thumb straight up. Your wrist should be relaxed, following the line of your fingers and thumb. 7. Curl your fingers so that the top two joints in them are bent, and your fingers wrap down. Your fingertips should touch or be near the base of your fingers. Your fingers will look like a hook. 8. Make a fist by bending your knuckles. Your thumb can gently rest against your index (pointing) finger. 9.  Unwind your fingers slightly so that your 4 with each finger. 12. Switch hands and repeat steps 1 through 5, even if only one hand is sore. DIP flexion   6. With your good hand, grasp one finger of your affected hand. Your thumb will be on the top side of your finger just below the joint that is closest to your fingernail. 7. Slowly bend your affected finger only at the joint closest to your fingernail. 8. Repeat 8 to 12 times. 9. Repeat steps 1 through 3 with each finger. 10. Switch hands and repeat steps 1 through 4, even if only one hand is sore. Follow-up care is a key part of your treatment and safety. Be sure to make and go to all appointments, and call your doctor if you are having problems. It's also a good idea to know your test results and keep a list of the medicines you take. Where can you learn more? Go to https://Cyber-Rainpepiceweb.[employer]. org and sign in to your [employer] account. Enter H169 in the [employer] box to learn more about \"Hand Arthritis: Exercises. \"     If you do not have an account, please click on the \"Sign Up Now\" link. Current as of: November 16, 2020               Content Version: 12.8  © 2006-2021 Healthwise, Incorporated. Care instructions adapted under license by [facility] ([facility]). If you have questions about a medical condition or this instruction, always ask your healthcare professional. [address] any warranty or liability for your use of this information.

## 2021-06-04 NOTE — ASSESSMENT & PLAN NOTE
Chronic. Stable. Encouraged to continued counseling, self-care and to notify office if symptoms worsen or fail to improve. Patient encouraged to try increasing physical activity again in the future, but to hold off at this time due to increased self-deprecating thoughts lately.

## 2021-06-28 ENCOUNTER — TELEPHONE (OUTPATIENT)
Dept: FAMILY MEDICINE CLINIC | Age: 31
End: 2021-06-28

## 2021-06-28 DIAGNOSIS — M77.01 MEDIAL EPICONDYLITIS OF ELBOW, RIGHT: Primary | ICD-10-CM

## 2021-06-28 NOTE — TELEPHONE ENCOUNTER
Still having right eblow pain . ( since 4/21)  Patient has compression sleve  , not helping , over the counter medication not working,  Victory Dates exercised ( help a little ) , stress balls ( not helping ) . Today her eblow -down numbness with tingling  About 5 mins. What should she do ?   Thank you

## 2021-06-28 NOTE — TELEPHONE ENCOUNTER
I would be happy to refer her to an orthopedic specialist for further evaluation.   Please provide patient with information below    [address], DO  [place], [address] [facility] [address], [place] [address]  [phone]

## 2021-07-01 DIAGNOSIS — M25.521 RIGHT ELBOW PAIN: Primary | ICD-10-CM

## 2021-07-02 ENCOUNTER — OFFICE VISIT (OUTPATIENT)
Dept: ORTHOPEDIC SURGERY | Age: 31
End: 2021-07-02
Payer: OTHER GOVERNMENT

## 2021-07-02 VITALS — HEART RATE: 84 BPM | DIASTOLIC BLOOD PRESSURE: 83 MMHG | SYSTOLIC BLOOD PRESSURE: 106 MMHG

## 2021-07-02 DIAGNOSIS — G56.21 ULNAR NERVE COMPRESSION, RIGHT: ICD-10-CM

## 2021-07-02 DIAGNOSIS — G56.21 NEURITIS OF RIGHT ULNAR NERVE: Primary | ICD-10-CM

## 2021-07-02 PROCEDURE — 99203 OFFICE O/P NEW LOW 30 MIN: CPT | Performed by: FAMILY MEDICINE

## 2021-07-02 NOTE — PROGRESS NOTES
Sports Medicine Consultation     CHIEF COMPLAINT:  Elbow Pain (Rt elbow. 3m. pain after painting. pain now with adl's)      HPI:  Chepe Hale is a 33y.o. year old female who is a new patient being seen for regarding new problem right elbow pain. The pain has been present for 3 month(s). The patient recalls a pain after painting by numbers injury. The patient has tried tennis elbow brace and HEP without improvement. The pain is described as can go from dull to sharp at intermittent times specifically things like vacuuming can be distruptive. The elbow does not swell. There is is  painful popping and clicking. The elbow does catch or lock. she has a past medical history of Anxiety, Attention-deficit hyperactivity disorder, Chronic low back pain, Chronic low back pain, Depression, Endometriosis, Fibromyalgia, Gastroesophageal reflux disease without esophagitis, HPV (human papilloma virus) anogenital infection, Hyperhidrosis, and PTSD (post-traumatic stress disorder). she has a past surgical history that includes Tonsillectomy; [place] tooth extraction;  section (); Colposcopy; and Endometrial biopsy (2020). family history includes ADHD in her brother, brother, and son; Anxiety Disorder in her mother; COPD in her maternal grandfather; Cancer in her maternal grandfather; Colon Cancer in her paternal grandfather; Depression in her brother, father, and mother; Diabetes type 2  in her maternal aunt; Lung Cancer in her maternal grandfather; No Known Problems in her paternal grandmother; Osteoarthritis in her maternal grandmother; Other in her brother and son; Ovarian Cancer in her maternal aunt.     Social History     Socioeconomic History    Marital status:      Spouse name: Not on file    Number of children: 1    Years of education: Not on file    Highest education level: Not on file   Occupational History    Occupation: stay at home mom    Tobacco Use    Smoking hoarseness, and swallowing problems. RESP:  Denies chronic cough, spitting up blood, and asthma/wheezing. GI: Denies abdominal pain, change in bowel habits, nausea or vomiting, and blood in stools. :  Denies frequent urination, burning or painful urination, blood in the urine, and bladder incontinence. NEURO:  Denies headache, memory loss, sleep disturbance, and tremor or movement disorder. PHYSICAL EXAM:   /83 (Site: Left Upper Arm)   Pulse 84   GENERAL: Wes Carbajal is a 33 y.o. female who is alert and oriented and sitting comfortably in our office. SKIN:  Intact without rashes, lesions or ulcerations. NEURO: Sensation to the extremity is intact. VASC:  Capillary refill is less than 3 seconds. Distal pulses are palpable. There is no lymphadenopathy. Elbow Exam:  Musculoskeletal/Neurologic:  Inspection-Deformity: no  Palpation-Tenderness: cubital tunnel over lat epicond  Elbow ROM-WNL  Pain with passive wrist flexion: no  Pain with passive wrist extension: no  Pain with active wrist flexion: yes  Pain with active wrist extension: no  Strength- WNL  Sensation-normal to light touch in median, ulnar, and radial distribution  Special Tests-No varus/valgus laxity  Milking Maneuver negative  Moulton-Haw negative    Tinel's at cubital tunnel:positive    Sensation-normal to light touch    PSYCH:  Good fund of knowledge and displays understanding of exam.    RADIOLOGY: No results found. Radiology:  3 views of the Right elbow were ordered, independently visualized by me, and discussed with patient. Findings: Right elbow radiographs demonstrate a calcification off the medial epicondyle that is inconsequential consistent with calcific tendinosis    Assessment no acute osseous abnormalities or joint effusions of the right elbow small calcification noted of the right medial epicondyle    IMPRESSION:     1. Neuritis of right ulnar nerve    2.  Ulnar nerve compression, right          PLAN:   We discussed some of the etiologies and natural histories of     ICD-10-CM    1. Neuritis of right ulnar nerve  G56.21 [facility] [place]   2. Ulnar nerve compression, right  G56.21 [facility] [place]   . We discussed the various treatment alternatives including anti-inflammatory medications, physical therapy, injections, further imaging studies and as a last resort surgery. This point patient's issue is ulnar nerve based in nature we will start her with ulnar nerve glides and have her do some formal physical therapy to alleviate the problem recommend elbow sleeve at night for pain follow-up based on progression or otherwise as needed. Return to clinic in No follow-ups on file. Yovana Comer     Please be aware portions of this note were completed using voice recognition software and unforeseen errors may have occurred    Electronically signed by Buck Chin DO, FAOASM on 7/2/21 at 9:24 AM EDT

## 2021-07-23 ENCOUNTER — HOSPITAL ENCOUNTER (OUTPATIENT)
Dept: PHYSICAL THERAPY | Facility: CLINIC | Age: 31
Setting detail: THERAPIES SERIES
Discharge: HOME | End: 2021-07-23
Payer: OTHER GOVERNMENT

## 2021-07-23 PROCEDURE — 97161 PT EVAL LOW COMPLEX 20 MIN: CPT

## 2021-07-23 PROCEDURE — 97110 THERAPEUTIC EXERCISES: CPT

## 2021-07-23 NOTE — CONSULTS
[] [facility]) - [facility]  [facility]  [address]  [phone]  F: [phone] [] [address]  [facility]   [address]  P: [phone]  F: [phone] [] [address] &  Therapy  [address]  P: [phone]  F: [phone] [] [address]  P: [phone]  F: [phone] [x] [address]  [facility]   [address]   [place]: [phone]  F: [phone]      Physical Therapy Upper Extremity Evaluation    Date:  2021  Patient: Ramon Decker   : 1990  MRN: [id number]  Physician: Dr. Patria Mosher[facility]   Medical Diagnosis: Neuritis of right ulnar nerve, Ulnar nerve compression, right   Rehab Codes: G56.21, G56.21  Onset Date: 2021    Next 's appt: n/a       Subjective:   CC/HPI: 32 y/o female presents to PT clinic with R elbow pain. Patient was doing a paint-by-numbers, and has had elbow pain ever since. Numbness started shortly after using the tennis elbow brace. Patient has tried tennis elbow brace and HEP without improvements. Patient has hx of fibromyalgia. Patient was swimming recreationally, and had pain in her elbow.        Past Medical History           Date Comments     HPV (human papilloma virus) anogenital infection [A63.0]       Anxiety [F41.9]       Depression [F32.9]       PTSD (post-traumatic stress disorder) [F43.10]       Chronic low back pain [M54.5, G89.29]       Chronic low back pain [M54.5, G89.29]       Fibromyalgia [M79.7] 2020      Gastroesophageal reflux disease without esophagitis [K21.9] 2021      Attention-deficit hyperactivity disorder [F90.9] 2021          PMHx: [] Unremarkable [] Diabetes [] HTN  [] Pacemaker   [] MI/Heart Problems [] Cancer [] Arthritis [] Other:              [x] Refer to full medical chart  In EPIC     Comorbidities:   [] Obesity [] Dialysis  [] N/A   [] Asthma/COPD [] Dementia [x] Other: fibromyalgia   [] Stroke [] Sleep apnea [] Other:   [] Vascular disease [] Rheumatic disease [] Other:     Tests:   [x] X-Ray: R elbow        Assessment no acute osseous abnormalities or joint effusions of the right    elbow small calcification noted of the right medial epicondyle         [] MRI:    [] Other:    Medications: [x] Refer to full medical record [] None [] Other:  Allergies:      [x] Refer to full medical record  [] None [] Other:    Function:  Hand Dominance  [] Right  [] Left  Marital Status  and son   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   Job status Homemaker    Work Activities/duties  Household duties   Recreational Activities Swimming, playing with her kids       ADL/IADL [x] Previously independent with all [x] Currently independent with all Who currently assists the patient with task    [] Previously independent with all except: [] Currently independent with all except:    Bathing  [] Assist [] Assist    Dress/grooming [] Assist [] Assist    Transfer/mobility [] Assist [] Assist    Feeding [] Assist [] Assist    Toileting [] Assist [] Assist    Driving [] Assist [] Assist    Housekeeping [] Assist [] Assist    Grocery shop/meal prep [] Assist [] Assist        Gait Prior level of function Current level of function    [x] Independent  [] Assist [x] Independent  [] Assist   Device: [x] Independent [x] Independent    [] Straight Cane [] Quad cane [] Straight Cane [] Quad cane    [] Standard walker [] Rolling walker   [] 4 wheeled walker [] Standard walker [] Rolling walker   [] 4 wheeled walker    [] Wheelchair [] Wheelchair       Pain present?  Yes    Location R elbow    Pain Rating currently 7/10    Pain at worse 9/10   Pain at best 5/10    Description of pain Constant achiness  Intermittent stabbing  Various depending on activity or position   Altered Sensation Numbness to forearm and fingertips, worse to the 3rd-5th digits    What makes it worse Elbow brace, Lifting bucket, washing hair   What makes it better Soaking it helps, hot and cold compression  CBD cream and ibuprofen give no relief    Symptom progression Gradually worsening   Sleep Frequent waking due to the pain  Unable to lay in certain positions           Objective:      STRENGTH    Left Right   C5 Shld Abd 4+ 4+   Shld Flexion 5 5   Shld IR     Shld ER     Shld HAB     Shld Ext     C6 Elb Flex 5 5   C7 Elb Ext 4 4   C8 EPL     T1 Fing Abd      50#  47#  42#  avg = 46 44#  42#  40#  avg = 42             AROM    Left Right   [facility]ld [facility]   Shld Flex [facility] WFL   Shld IR     Shld ER     Shld HAB               Elbow Flex WFL WFL   Elbow Ext WFL WFL   Supination 69 68   Pronation 68 46        Wrist flex  60 74   Wrist ext 75 56   Wrist UD 35 37   Wrist RD 29 24         OBSERVATION No Deficit Deficit Not Tested Comments   Forward Head [] [x] []    Rounded Shoulders [] [x] []    Kyphosis [x] [] []    Scap Height/Position [] [] [x]    Winging [] [] [x]    SH Rhythm [] [] [x]    INSPECTION/PALPATION       SC/AC Joint [] [] [x]    Supraspinatus [] [] [x]    Biceps tendon/groove [] [] [x]    Posterior shld [] [] [x]    Elbow  [] [] [x]    NEUROLOGICAL       Cervical ROM/Quadrant [] [] [x]    Reflexes [] [] [x]    Compression/Distraction [] [] [x]    Sensation [] [x] [] Decreased sensation to the ulnar nerve distribution on the R   Pain to palpation of the R elbow, medial and lateral to the olecranon   Pain to the palpation of the wrist flexor muscles       Flexibility Normal LUE Deficit RUE Deficit   Supinators [] [] [x]   Pronators [x] [] []   Other:      Wrist flexors  -- Tight                   Functional Test: Upper Extremity Functional Index  Score: 37% functionally impaired     Comments:    Assessment:  34 y/o female presents to PT clinic with bilateral elbow pain R>L.  Patient with decreased sensation to the R ulnar distribution. Pain to the wrist flexors. Generalized tenderness to palpation of the R elbow this date. Decreased R wrist and elbow ROM into pronation/supination. Patient provided with exercises this date to address the impairments. Patient would benefit from skilled physical therapy services in order to: improve R wrist/elbow ROM, improve R  strength, and decreased pain and numbness to ease ADL's and improve quality of life     Problems:    [x] ? Pain: 5-9/10 pain to her R elbow   [x] ? ROM: dec R wrist extension and pronation ROM   [x] ? Strength: dec  strength       Goals  MET NOT MET ON-  GOING  Details   Date Addressed:        STG: To be met in 6 treatments           1. ? Pain: Decrease R elbow pain levels to <3/10 with ADLs []  []  []      2. ? ROM: Increase wrist/elbow ROM to equal bilat to reduce difficulty with ADLs []  []  []      3. ? Strength: Increase MMT to 5/5 without increase in pain throughout to ease functional limitations and mobility  []  []  []     4. Improve R hand  strength to at least 95% of the L hand []  []  []     5. Independent with Home Exercise Programs []  []  []      []  []  []     Date Addressed:        LTG: To be met in 8 treatments       1. Improve score on assessment tool Upper Extremity Functional Scale from 37% impairment to less than <20% impairment  []  []  []     2.  Reduce pain levels to <2/10 or less with ADLs []  []  []                      Patient goals: decrease pain     Rehab Potential:  [x] Good  [] Fair  [] Poor   Suggested Professional Referral:  [x] No  [] Yes:  Barriers to Goal Achievement:  [x] No  [] Yes:  Domestic Concerns:  [x] No  [] Yes:    Pt. Education:  [x] Plans/Goals, Risks/Benefits discussed  [x] Home exercise program  Method of Education: [x] Verbal  [x] Demo  [x] Written  Discussed decreased use of her RUE to allow the muscles and nerves to rest. Encouraged neutral wrist movement when completing fine motor tasks. Access Code: [email or web address]  URL: [email or web address]  Date: 07/23/2021  Prepared by: Surya Valentine    Exercises  Standing Wrist Extension Stretch - 3-4 x daily - 7 x weekly - 3 sets - 30 hold  Standing Ulnar Nerve Glide - 3-4 x daily - 7 x weekly - 3-5 reps  Finger Abduction/Adduction - 3-4 x daily - 7 x weekly - 10 reps  Wrist Extension/Flexion - 3-4 x daily - 7 x weekly - 3-5 reps  Forearm Rotation (Supination/Pronation) - 3-4 x daily - 7 x weekly - 3-5 reps  Seated Elbow Flexion and Extension AROM - 3-4 x daily - 7 x weekly - 3-5 reps      Comprehension of Education:  [x] Verbalizes understanding. [x] Demonstrates understanding. [x] Needs Review. [] Demonstrates/verbalizes understanding of HEP/Ed previously given. Treatment Plan:  [x] Therapeutic Exercise   82850  [] Iontophoresis: 4 mg/mL Dexamethasone Sodium Phosphate  mAmin  75351   [] Therapeutic Activity  61274 [] Vasopneumatic cold with compression  74612    [] Gait Training   14077 [] Ultrasound   81154   [] Neuromuscular Re-education  38216 [] Electrical Stimulation Unattended  18226   [x] Manual Therapy  43027 [] Electrical Stimulation Attended  72816   [x] Instruction in HEP  [] Lumbar/Cervical Traction  74571   [] Aquatic Therapy   08442 [] Cold/hotpack    [] Massage   98665      [] Dry Needling, 1 or 2 muscles  09452   [] Biofeedback, first 15 minutes   65228  [] Biofeedback, additional 15 minutes   45235 [] Dry Needling, 3 or more muscles  24812     []  Medication allergies reviewed for use of    Dexamethasone Sodium Phosphate 4mg/ml     with iontophoresis treatments. Pt is not allergic.        Frequency: 1 x/week for 8 visits    Todays Treatment:  Modalities:   Precautions: Fibromyalgia   Exercises:  Exercise    R ulnar nerve decompression  Reps/ Time Weight/ Level Comments         Pulley's            Standing wrist extension stretch  3x30\"     Ulnar nerve glides  x10            Finger abd/add  x10      Wrist flex/ext  x10 A    Pronation/supination  x10  2#    Elbow flex/ext x10      Other:    Specific Instructions for next treatment: progress mobility, improve R  strength       Evaluation Complexity:  History (Personal factors, comorbidities) [] 0 [x] 1-2 [] 3+   Exam (limitations, restrictions) [] 1-2 [x] 3 [] 4+   Clinical presentation (progression) [x] Stable [] Evolving  [] Unstable   Decision Making [x] Low [] Moderate [] High    [x] Low Complexity [] Moderate Complexity [] High Complexity        Treatment Charges: Mins Units   [x] Evaluation       [x]  Low       []  Moderate       []  High 30 1   []  Modalities     [x]  Ther Exercise 10 1   []  Manual Therapy     []  Ther Activities     []  Aquatics     []  Vasocompression     []  Other       TOTAL TREATMENT TIME: 40 min     Time in: 08:03    Time Out: 08:50    Electronically signed by: Jojo Knox PT        Physician Signature:________________________________Date:__________________  By signing above or cosigning this note, I have reviewed this plan of care and certify a need for medically necessary rehabilitation services.      *PLEASE SIGN ABOVE AND FAX BACK ALL PAGES*

## 2021-08-05 ENCOUNTER — TELEPHONE (OUTPATIENT)
Dept: FAMILY MEDICINE CLINIC | Age: 31
End: 2021-08-05

## 2021-08-05 NOTE — TELEPHONE ENCOUNTER
----- Message from Oscar Penny sent at 8/5/2021  1:23 PM EDT -----  Subject: Message to Provider    QUESTIONS  Information for Provider? patient is calling for status for referral   request for [facility] and Therapy, please   f/u  ---------------------------------------------------------------------------  --------------  CALL BACK INFO  What is the best way for the office to contact you? OK to leave message on   voicemail  Preferred Call Back Phone Number? [phone]  ---------------------------------------------------------------------------  --------------  SCRIPT ANSWERS  Relationship to Patient?  Self

## 2021-08-05 NOTE — TELEPHONE ENCOUNTER
Pt needs a prior auth for pt from [facility] I called the [phone] requested a referral form

## 2021-08-31 ENCOUNTER — HOSPITAL ENCOUNTER (OUTPATIENT)
Dept: PHYSICAL THERAPY | Facility: CLINIC | Age: 31
Setting detail: THERAPIES SERIES
Discharge: HOME | End: 2021-08-31
Payer: OTHER GOVERNMENT

## 2021-08-31 PROCEDURE — 97110 THERAPEUTIC EXERCISES: CPT

## 2021-08-31 NOTE — FLOWSHEET NOTE
[] [address] [facility] &  Therapy  [address]  [phone]  F: [phone] [] [address]  [facility] [address]   [address]  P: [phone]  F: [phone] [] [address] &  Therapy  [address]  P: [phone]  F: [phone] [] [address]  P: [phone]  F: [phone] [x] [address]  [facility]   [address]   [place]: [phone]  F: [phone]      Physical Therapy Daily Treatment Note    Date:  2021  Patient Name:  Darline Weiss    :  1990  MRN: [id number]    Visit# / Alexis Peaks: Dr. Marely Myers: Thang Salvage Alexey after eval)   Medical Diagnosis: Neuritis of right ulnar nerve, Ulnar nerve compression, right    Rehab Codes: G56.21, G56.21  Onset Date: 2021                                   Next 's appt: n/a   Total visits: ; Progress note for Medicare patient due at visit 10     Cancels/No Shows: 0/0  Subjective:    Pain:  [] Yes  [] No Location: R elbow Pain Rating: (0-10 scale) 7-8/10  Pain altered Tx:  [] No  [] Yes  Action:  Comments: Pt arrives to therapy reporting high R elbow pain and some tenderness. Pt reports compliance with HEP issued at evaulation, reporting minimal reduction in pain.      Objective:  Modalities:     Precautions: Fibromyalgia   Exercises:  Exercise     R ulnar nerve decompression  Reps/ Time Weight/ Level Comments             Jaquelin's  2'    flexion             Standing wrist extension stretch  3x30\"       Ulnar nerve glides  x10                  Finger abd/add  x15       Wrist flex/ext  x15 A     Pronation/supination  x15 2#     Elbow flex/ext x15  Fatigued after 11 reps   Putty squeeze x30 green    Other:     Specific Instructions for next treatment: progress mobility, improve R  strength      Treatment Charges: Mins Units   []  Modalities     [x]  Ther Exercise 41 3   []  Manual Therapy     []  Ther Activities     []  Aquatics     []  Vasocompression     []  Other     Total Treatment time 41 3       Assessment: [x] Progressing toward goals. Initiated treatment with pulleys with some fatigued noted after 2 minutes. Continued with exercises per log with fair tolerance, providing verbal cues for technique with good carryover. Added putty squeezes to progress  strength and increased reps for wrist flex/ext, supination/pronation, and elbow flex/ext with fair tolerance. Allowed for rest breaks throughout treatment as needed due to fatigue with exercises. Pt reports some soreness at end of treatment. [] No change. [] Other:  [x] Patient would continue to benefit from skilled physical therapy services in order to: improve R wrist/elbow ROM, improve R  strength, and decreased pain and numbness to ease ADL's and improve quality of life        Goals  MET NOT MET ON-  GOING  Details   Date Addressed:            STG: To be met in 6 treatments  ?          1. ? Pain: Decrease R elbow pain levels to <3/10 with ADLs []? ?  []??  []??      2. ? ROM: Increase wrist/elbow ROM to equal bilat to reduce difficulty with ADLs []? ?  []??  []??      3. ? Strength: Increase MMT to 5/5 without increase in pain throughout to ease functional limitations and mobility  []? ?  []??  []??      4. Improve R hand  strength to at least 95% of the L hand []? ?  []??  []??      5. Independent with Home Exercise Programs []? ?  []??  []??        []? ?  []??  []??      Date Addressed:            LTG: To be met in 8 treatments           1. Improve score on assessment tool Upper Extremity Functional Scale from 37% impairment to less than <20% impairment  []??  []??  []??      2. Reduce pain levels to <2/10 or less with ADLs []? ?  []??  []??        Patient goals: decrease pain       Pt. Education:  [x] Yes  [] No  [x] Reviewed Prior HEP/Ed  Method of Education: [x] Verbal  [x] Demo  [] Written  Comprehension of Education:  [x] Verbalizes understanding. [] Demonstrates understanding. [x] Needs review. [] Demonstrates/verbalizes HEP/Ed previously given. Plan: [x] Continue current frequency toward long and short term goals.     [x] Specific Instructions for subsequent treatments: progress mobility, improve R  strength, trial MFR    Time In: 11:07am            Time Out: 11:50am    Electronically signed by:  Harvey Joe PTA

## 2021-09-10 ENCOUNTER — HOSPITAL ENCOUNTER (OUTPATIENT)
Dept: PHYSICAL THERAPY | Facility: CLINIC | Age: 31
Setting detail: THERAPIES SERIES
Discharge: HOME | End: 2021-09-10
Payer: OTHER GOVERNMENT

## 2021-09-10 PROCEDURE — 97016 VASOPNEUMATIC DEVICE THERAPY: CPT

## 2021-09-10 PROCEDURE — 97110 THERAPEUTIC EXERCISES: CPT

## 2021-09-10 PROCEDURE — 97140 MANUAL THERAPY 1/> REGIONS: CPT

## 2021-09-10 NOTE — FLOWSHEET NOTE
[] [facility]) [facility] &  Therapy  [address]  [phone]  F: [phone] [] [address]  [place]   [address]  P: [phone]  F: [phone] [] [address] &  Therapy  [address]  P: [phone]  F: [phone] [] [address]  P: [phone]  F: [phone] [x] [address]  [facility]   [address]   [place]: [phone]  F: [phone]      Physical Therapy Daily Treatment Note    Date:  9/10/2021  Patient Name:  Chepe Hale    :  1990  MRN: [id number]  Visit# / Charissa Peers: Dr. Zen Torres: Alex Littlejohn (18 visits approved until 21, Honorio Lloyd #[id number])   Medical Diagnosis: Neuritis of right ulnar nerve, Ulnar nerve compression, right    Rehab Codes: G56.21, G56.21  Onset Date: 2021                                   Next 's appt: n/a   Total visits: 3/18; Progress note for Medicare patient due at visit 10     Cancels/No Shows: 0/0  Subjective:    Pain:  [] Yes  [] No Location: R elbow Pain Rating: (0-10 scale) mild/10  Pain altered Tx:  [] No  [] Yes  Action:  Comments: Pt arrives with reports of mild pain to her forearm. She has been resting her arm at home and not lifting as much as she used to. She reports decreased numbness and tingling to her R hand over the last week.      Objective:  Modalities:     Precautions: Fibromyalgia   Exercises:  Exercise     R ulnar nerve decompression  Reps/ Time Weight/ Level Comments             Pulley's  2'/2'    flexion, scaption             Standing wrist extension stretch  3x30\"       Standing wrist flexion stretch  3x30\"     Ulnar nerve glides  x5   Following ice              Finger abd/add  x15       Wrist flex x15 1#    Eccentric wrist extension  x15      Pronation/supination  x15 1#     Elbow flex/ext 2x10 1#/A     Putty squeeze            3-way bicep curls  x10   Sitting edge of bed               Other: MFR using the Hawkgrips to the R wrist extensor muscles    Proximal radioulnar glides to improve supination ROM of the R elbow with passive stretching into supination    Vasocompression at moderate intensity at 34' to improve post-exercise soreness and deep tissue swelling     Specific Instructions for next treatment: progress mobility, improve R  strength      Treatment Charges: Mins Units   []  Modalities     [x]  Ther Exercise 35 2   [x]  Manual Therapy 10 1   []  Ther Activities     []  Aquatics     [x]  Vasocompression 10 1   []  Other     Total Treatment time 55 3       Assessment: [x] Progressing toward goals. Initiated treatment with pulleys in flexion and scaption. She completed forearm stretches with continued tightness noted. She has pain with resisted ulnar and radial deviation. No pain with active wrist flexion. Encouraged patient to complete bicep curls with a neutral wrist to prevent overactivity of the wrist extensor muscle group. Initiated wrist extensor myofascial release and manual to improve supination. Increased ROM following. Ended session with vaso compression to decrease deep swelling and muscle soreness. Decreased pain following session. [] No change. [] Other:  [x] Patient would continue to benefit from skilled physical therapy services in order to: improve R wrist/elbow ROM, improve R  strength, and decreased pain and numbness to ease ADL's and improve quality of life        Goals  MET NOT MET ON-  GOING  Details   Date Addressed:            STG: To be met in 6 treatments  ?          1. ? Pain: Decrease R elbow pain levels to <3/10 with ADLs []? ?  []??  []??      2. ? ROM: Increase wrist/elbow ROM to equal bilat to reduce difficulty with ADLs []? ?  []??  []??      3. ? Strength:  Increase MMT to 5/5 without increase in pain throughout to ease functional limitations and mobility  []? ?  []??  []??      4. Improve R hand  strength to at least 95% of the L hand []? ?  []??  []??      5. Independent with Home Exercise Programs []? ?  []??  []??        []? ?  []??  []??      Date Addressed:            LTG: To be met in 8 treatments           1. Improve score on assessment tool Upper Extremity Functional Scale from 37% impairment to less than <20% impairment  []??  []??  []??      2. Reduce pain levels to <2/10 or less with ADLs []? ?  []??  []??                        Patient goals: decrease pain     Pt. Education:  [x] Yes  [] No  [x] Reviewed Prior HEP/Ed  Method of Education: [x] Verbal  [x] Demo  [] Written  Educated patient to continue to ice and be aware of repeated exercises at home that may be causing nerve irritation. Comprehension of Education:  [x] Verbalizes understanding. [x] Demonstrates understanding. [x] Needs review. [] Demonstrates/verbalizes HEP/Ed previously given. Plan: [x] Continue current frequency toward long and short term goals.     [x] Specific Instructions for subsequent treatments: progress mobility, improve R  strength, humeroulnar distraction, radioulnar glides    Time In: 14:00            Time Out: 15:00    Electronically signed by:  Calin Abbasi PT

## 2021-09-17 ENCOUNTER — HOSPITAL ENCOUNTER (OUTPATIENT)
Dept: PHYSICAL THERAPY | Facility: CLINIC | Age: 31
Setting detail: THERAPIES SERIES
Discharge: HOME | End: 2021-09-17
Payer: OTHER GOVERNMENT

## 2021-09-17 PROCEDURE — 97140 MANUAL THERAPY 1/> REGIONS: CPT

## 2021-09-17 PROCEDURE — 97110 THERAPEUTIC EXERCISES: CPT

## 2021-09-17 NOTE — FLOWSHEET NOTE
[] [facility]) [facility] &  Therapy  [address]  [phone]  F: [phone] [] [address]  [facility]   [address]  P: [phone]  F: [phone] [] [address] &  Therapy  [address]  P: [phone]  F: [phone] [] [address]  P: [phone]  F: [phone] [x] [address]  [facility]   [address]   [place]: [phone]  F: [phone]      Physical Therapy Daily Treatment Note    Date:  2021  Patient Name:  Magda Sheth    :  1990  MRN: [id number]  Visit# / Lyric Laine: Dr. Rizwan Santos: Lisa Reynolds (18 visits approved until 21, Gisela Love #[id number])   Medical Diagnosis: Neuritis of right ulnar nerve, Ulnar nerve compression, right    Rehab Codes: G56.21, G56.21  Onset Date: 2021                                   Next 's appt: n/a   Total visits:      Cancels/No Shows: 0/0    Subjective:    Pain:  [] Yes  [] No Location: R elbow Pain Rating: (0-10 scale) mild/10  Pain altered Tx:  [] No  [] Yes  Action:  Comments: Pt arrives with reports of mild pain to her forearm. She reports that she has been cracking her R elbow which is painful to do but relieves pain afterwards. Reports that she does have increased stress at home and is guilty of clenching her fists.      Objective:  Modalities:     Precautions: Fibromyalgia   Exercises:  Exercise     R ulnar nerve decompression  Reps/ Time Weight/ Level Comments             Pulley's  3'/3'    flexion, scaption             Standing wrist extension stretch  3x30\"       Standing wrist flexion stretch       Ulnar nerve glides  x10                Finger abd/add  x15       Wrist flex   Held    Wrist extension  x15 A Pronation/supination  x15 1#     Elbow flex/ext      Putty squeeze            3-way bicep curls  x10  1#          Tband       Rows  x10 Orange     Bilat ER  x10 Orange          Upper trap stretch  3x30\"     Other: MFR using the Hawkgrips to the R wrist flexor muscles   Radioulnar distraction on the R elbow 2'    Proximal radioulnar glides to improve pronation/supination ROM of the R elbow 2'     Somatic Dysfunctions Normal Deficit Details   Cervical   [] []    Thoracic   [] [x] R rib elevation MET    Rib   [] []    Pelvis   [] []    Lumbar [] []    SI   [] []    FRS=flexed, rotated, side-bent  ERS=extended, rotated, side-bent   MOB=Mobilization  MFR=Myofascial Release  MET=Muscle Energy Technique  MFR=Myofascial Release         Specific Instructions for next treatment: progress mobility, improve R  strength      Treatment Charges: Mins Units   []  Modalities     [x]  Ther Exercise 30 2   [x]  Manual Therapy 15 1   []  Ther Activities     []  Aquatics     []  Vasocompression     []  Other     Total Treatment time 45 3       Assessment: [x] Progressing toward goals. Initiated treatment with pulleys in flexion and scaption. She completed forearm stretches with continued tightness noted. Held resisted wrist flexion this date due to increase in symptoms since last visit. Patient admits to increase stress due to have a special needs son, but discussed that increased wrist clenching may be contributing to her symptoms. Completed MFR to the R wrist flexor muscles this date. Patient has an elevated R first rib this date which was corrected with manual and an upper trap stretch. Initiated scapular strengthening with Tband, with instruction to maintain a neutral wrist when gripping and try to complete the exercise using her back muscles. Decreased pain following session. [] No change.      [] Other:  [x] Patient would continue to benefit from skilled physical therapy services in order to: improve R wrist/elbow ROM, improve R  strength, and decreased pain and numbness to ease ADL's and improve quality of life        Goals  MET NOT MET ON-  GOING  Details   Date Addressed:            STG: To be met in 6 treatments  ?          1. ? Pain: Decrease R elbow pain levels to <3/10 with ADLs []? ?  []??  []??      2. ? ROM: Increase wrist/elbow ROM to equal bilat to reduce difficulty with ADLs []? ?  []??  []??      3. ? Strength: Increase MMT to 5/5 without increase in pain throughout to ease functional limitations and mobility  []? ?  []??  []??      4. Improve R hand  strength to at least 95% of the L hand []? ?  []??  []??      5. Independent with Home Exercise Programs []? ?  []??  []??        []? ?  []??  []??      Date Addressed:            LTG: To be met in 8 treatments           1. Improve score on assessment tool Upper Extremity Functional Scale from 37% impairment to less than <20% impairment  []??  []??  []??      2. Reduce pain levels to <2/10 or less with ADLs []? ?  []??  []??                        Patient goals: decrease pain     Pt. Education:  [x] Yes  [] No  [x] Reviewed Prior HEP/Ed  Method of Education: [x] Verbal  [x] Demo  [x] Written  Added scapular strengthening this date with focus on wrist position gripping on the band. Access Code: PAULINE [facility]  URL: [email or web address]  Date: 09/17/2021  Prepared by: Nicole Browning    Exercises  Wrist AROM Radial Ulnar Deviation - 1 x daily - 7 x weekly - 3 sets - 10 reps  Scapular Retraction with Resistance - 1 x daily - 7 x weekly - 10 reps - 3 sets  Shoulder External Rotation and Scapular Retraction with Resistance - 1 x daily - 7 x weekly - 10 reps - 3 sets  Seated Wrist Extension with Dumbbell - 1 x daily - 7 x weekly - 3 sets - 10 reps    Comprehension of Education:  [x] Verbalizes understanding. [x] Demonstrates understanding. [x] Needs review. [] Demonstrates/verbalizes HEP/Ed previously given.      Plan: [x] Continue current frequency toward long and short term goals.     [x] Specific Instructions for subsequent treatments: progress mobility, improve R  strength, humeroulnar distraction, radioulnar glides, MFR to wrist flexors     Time In: 13:54            Time Out: 14:52    Electronically signed by:  Jojo Knox PT

## 2021-09-21 ENCOUNTER — HOSPITAL ENCOUNTER (OUTPATIENT)
Dept: PHYSICAL THERAPY | Facility: CLINIC | Age: 31
Setting detail: THERAPIES SERIES
Discharge: HOME | End: 2021-09-21
Payer: OTHER GOVERNMENT

## 2021-09-21 PROCEDURE — 97110 THERAPEUTIC EXERCISES: CPT

## 2021-09-21 PROCEDURE — 97140 MANUAL THERAPY 1/> REGIONS: CPT

## 2021-09-21 NOTE — FLOWSHEET NOTE
[] [facility]) - [facility] &  Therapy  [address]  [phone]  F: [phone] [] [address]  [place] [address]   [address]  P: [phone]  F: [phone] [] [address] &  Therapy  [address]  P: [phone]  F: [phone] [] [address]  P: [phone]  F: [phone] [x] [address]  [facility]   [address]   [place]: [phone]  F: [phone]      Physical Therapy Daily Treatment Note    Date:  2021  Patient Name:  Joni Ponce    :  1990  MRN: [id number]  Visit# / Shyrl Baldo: Dr. Sanders Stain: Jenny Slaughter (18 visits approved until 21, Anastasia Drew #[id number])   Medical Diagnosis: Neuritis of right ulnar nerve, Ulnar nerve compression, right    Rehab Codes: G56.21, G56.21  Onset Date: 2021                                   Next 's appt: n/a   Total visits:      Cancels/No Shows: 0/0    Subjective:    Pain:  [] Yes  [] No Location: R elbow Pain Rating: (0-10 scale) mild/10  Pain altered Tx:  [] No  [] Yes  Action:  Comments: Pt arrives with reports of mild pain to her forearm. Pt reports she has been having less numbness and tingling since starting therapy.      Objective:  Modalities:     Precautions: Fibromyalgia   Exercises:  Exercise     R ulnar nerve decompression  Reps/ Time Weight/ Level Comments             Pulley's  3'/3'    flexion, scaption             Standing wrist extension stretch  3x30\"       Standing wrist flexion stretch       Ulnar nerve glides  x10                Finger abd/add  x15       Wrist flex   Held    Wrist extension  x15 A     Pronation/supination  x20 1#     Elbow flex/ext      Putty squeeze            3-way bicep curls  x10  2#          Tband Rows  x15 Orange     Bilat ER  x15 Orange          Scapular retraction  5\"x15     Upper trap stretch  3x30\"     Other: MFR using the Hawkgrips to the R wrist flexor muscles        Specific Instructions for next treatment: progress mobility, improve R  strength      Treatment Charges: Mins Units   []  Modalities     [x]  Ther Exercise 25 2   [x]  Manual Therapy 15 1   []  Ther Activities     []  Aquatics     []  Vasocompression     []  Other     Total Treatment time 40 3       Assessment: [x] Progressing toward goals. Initiated treatment with pulleys in flexion and scaption followed by stretches. Continued with manual using hawkgrips to R wrist flexors with some pain and tightness noted but reports slight relief of pain immediately after. Increased reps for pronation/supinations and increased resistance to 2# for bicep curls to progress UE strength with good tolerance and some fatigue noted. Educated pt to try to maintain a neutral wrist when gripping to reduce tightening on wrist flexors. Encouraged pt to continue with daily compliance of HEP, pt verbalizes understanding. Pt denies any increase in pain at end of treatment but reports some soreness. [] No change. [] Other:  [x] Patient would continue to benefit from skilled physical therapy services in order to: improve R wrist/elbow ROM, improve R  strength, and decreased pain and numbness to ease ADL's and improve quality of life        Goals  MET NOT MET ON-  GOING  Details   Date Addressed:            STG: To be met in 6 treatments            1. ? Pain: Decrease R elbow pain levels to <3/10 with ADLs []  []  []      2. ? ROM: Increase wrist/elbow ROM to equal bilat to reduce difficulty with ADLs []  []  []      3. ? Strength: Increase MMT to 5/5 without increase in pain throughout to ease functional limitations and mobility  []  []  []      4. Improve R hand  strength to at least 95% of the L hand []  []  []      5.  Independent with Home Exercise Programs []  []  []        []  []  []      Date Addressed:            LTG: To be met in 8 treatments           1. Improve score on assessment tool Upper Extremity Functional Scale from 37% impairment to less than <20% impairment  []  []  []      2. Reduce pain levels to <2/10 or less with ADLs []  []  []                        Patient goals: decrease pain     Pt. Education:  [x] Yes  [] No  [x] Reviewed Prior HEP/Ed  Method of Education: [x] Verbal  [x] Demo  [x] Written  Added scapular strengthening this date with focus on wrist position gripping on the band. Access Code: PAULINE [place]  URL: [email or web address]  Date: 09/17/2021  Prepared by: Letitia Cummings    Exercises  Wrist AROM Radial Ulnar Deviation - 1 x daily - 7 x weekly - 3 sets - 10 reps  Scapular Retraction with Resistance - 1 x daily - 7 x weekly - 10 reps - 3 sets  Shoulder External Rotation and Scapular Retraction with Resistance - 1 x daily - 7 x weekly - 10 reps - 3 sets  Seated Wrist Extension with Dumbbell - 1 x daily - 7 x weekly - 3 sets - 10 reps    Comprehension of Education:  [x] Verbalizes understanding. [x] Demonstrates understanding. [x] Needs review. [] Demonstrates/verbalizes HEP/Ed previously given. Plan: [x] Continue current frequency toward long and short term goals.     [x] Specific Instructions for subsequent treatments: progress mobility, improve R  strength, humeroulnar distraction, radioulnar glides, MFR to wrist flexors     Time In: 3:58pm            Time Out: 4:40pm    Electronically signed by:  Lennox Barth PTA

## 2021-09-24 ENCOUNTER — HOSPITAL ENCOUNTER (OUTPATIENT)
Dept: PHYSICAL THERAPY | Facility: CLINIC | Age: 31
Setting detail: THERAPIES SERIES
Discharge: HOME | End: 2021-09-24
Payer: OTHER GOVERNMENT

## 2021-09-24 PROCEDURE — 97110 THERAPEUTIC EXERCISES: CPT

## 2021-09-24 PROCEDURE — 97140 MANUAL THERAPY 1/> REGIONS: CPT

## 2021-09-24 NOTE — FLOWSHEET NOTE
[] [facility]) - [facility] &  Therapy  [address]  [phone]  F: [phone] [] [address]  [place] [address]   [address]  P: [phone]  F: [phone] [] [address] &  Therapy  [address]  P: [phone]  F: [phone] [] [address]  P: [phone]  F: [phone] [x] [address]  [facility]   [address]   [place]: [phone]  F: [phone]      Physical Therapy Daily Treatment Note    Date:  2021  Patient Name:  Jason Nguyen    :  1990  MRN: [id number]  Visit# / Halie Martinez: Dr. Rhonda Vásquez: Hallie Edwards (18 visits approved until 21, [address] [place] [address] #[id number])   Medical Diagnosis: Neuritis of right ulnar nerve, Ulnar nerve compression, right    Rehab Codes: G56.21, G56.21  Onset Date: 2021                                   Next 's appt: n/a   Total visits:      Cancels/No Shows: 0/0    Subjective:    Pain:  [] Yes  [x] No Location: R elbow Pain Rating: (0-10 scale) 0/10  Pain altered Tx:  [x] No  [] Yes  Action:  Comments: Pt arrives with reports of pain on Wednesday when she attempted to empty out the litter box. No complaints of pain this date but she has refrained from doing any lifting over the last few days. She reports that she felt good after last PT treatment session.      Objective:  Modalities:     Precautions: Fibromyalgia   Exercises:  Exercise     R ulnar nerve decompression  Reps/ Time Weight/ Level Comments             Pulley's  3'/3'    flexion, scaption             Standing wrist extension stretch  3x30\"       Standing wrist flexion stretch  3x30\"     Ulnar nerve glides  x10                Finger abd/add  x15       Wrist flex   Held    Wrist extension  x15 A Pronation/supination  x20 2#     Putty squeeze            3-way bicep curls  x10  2#          Tband       Rows  x15 Lime     Shoulder Ext  x15 Lime     Bilat ER             Scapular retraction  5\"x15     Upper trap stretch  3x30\"     Pronation/Supination  Towel Twists  x10      Other: MFR using the Hawkgrips to the R wrist flexor muscles, passive stretching into pronation and supination, passive wrist extension stretch with neutral wrist          Specific Instructions for next treatment: progress mobility, improve R  strength      Treatment Charges: Mins Units   []  Modalities     [x]  Ther Exercise 29 2   [x]  Manual Therapy 15 1   []  Ther Activities     []  Aquatics     []  Vasocompression     []  Other     Total Treatment time 44 3       Assessment: [x] Progressing toward goals. Initiated treatment with pulleys in flexion and scaption followed by stretches. Added corner stretch to improve pectoral muscle group flexibility. Progressed with increased resistance with exercises. Added wrist flexion mobilization with movement using the BandIT brace. Decreased pain compared to movement without the brace. Ended session with manual using hawkgrips to R wrist flexors. Pt denies any increase in pain at end of treatment but reports some soreness. [] No change. [] Other:  [x] Patient would continue to benefit from skilled physical therapy services in order to: improve R wrist/elbow ROM, improve R  strength, and decreased pain and numbness to ease ADL's and improve quality of life        Goals  MET NOT MET ON-  GOING  Details   Date Addressed:            STG: To be met in 6 treatments            1. ? Pain: Decrease R elbow pain levels to <3/10 with ADLs []  []  []      2. ? ROM: Increase wrist/elbow ROM to equal bilat to reduce difficulty with ADLs []  []  []      3. ? Strength: Increase MMT to 5/5 without increase in pain throughout to ease functional limitations and mobility  []  []  []      4.   Improve R hand  strength to at least 95% of the L hand []  []  []      5. Independent with Home Exercise Programs []  []  []        []  []  []      Date Addressed:            LTG: To be met in 8 treatments           1. Improve score on assessment tool Upper Extremity Functional Scale from 37% impairment to less than <20% impairment  []  []  []      2. Reduce pain levels to <2/10 or less with ADLs []  []  []                        Patient goals: decrease pain     Pt. Education:  [x] Yes  [] No  [x] Reviewed Prior HEP/Ed  Method of Education: [x] Verbal  [x] Demo  [x] Written  Added scapular strengthening this date with focus on wrist position gripping on the band. Comprehension of Education:  [x] Verbalizes understanding. [x] Demonstrates understanding. [x] Needs review. [] Demonstrates/verbalizes HEP/Ed previously given. Plan: [x] Continue current frequency toward long and short term goals.     [x] Specific Instructions for subsequent treatments: progress mobility, improve R  strength, humeroulnar distraction, radioulnar glides, MFR to wrist flexors     Time In: 1:55pm            Time Out: 2:45pm    Electronically signed by:  Megan Haskins PT

## 2021-09-28 ENCOUNTER — HOSPITAL ENCOUNTER (OUTPATIENT)
Dept: PHYSICAL THERAPY | Facility: CLINIC | Age: 31
Setting detail: THERAPIES SERIES
Discharge: HOME | End: 2021-09-28
Payer: OTHER GOVERNMENT

## 2021-09-28 PROCEDURE — 97140 MANUAL THERAPY 1/> REGIONS: CPT

## 2021-09-28 PROCEDURE — 97110 THERAPEUTIC EXERCISES: CPT

## 2021-09-28 NOTE — FLOWSHEET NOTE
[] [facility]) - [facility] &  Therapy  [address]  [phone]  F: [phone] [] [address]  [place] [address]   [address]  P: [phone]  F: [phone] [] [address] &  Therapy  [address]  P: [phone]  F: [phone] [] [address]  P: [phone]  F: [phone] [x] [address]  [facility]   [address]   [place]: [phone]  F: [phone]      Physical Therapy Daily Treatment Note    Date:  2021  Patient Name:  Jay Gillis    :  1990  MRN: [id number]  Visit# / Regina Azevedo: Dr. Daly Oliver: Henrry Myers (18 visits approved until 21, [facility] #[id number])   Medical Diagnosis: Neuritis of right ulnar nerve, Ulnar nerve compression, right    Rehab Codes: G56.21, G56.21  Onset Date: 2021                                   Next 's appt: n/a   Total visits:      Cancels/No Shows: 0/0    Subjective:    Pain:  [] Yes  [x] No Location: R elbow Pain Rating: (0-10 scale) 6-7/10  Pain altered Tx:  [x] No  [] Yes  Action:  Comments: Pt arrives reporting increased pain this date. She reports that she has increased pain when she is cleaning and using spray bottles. She reports that she was able to lift her recycling bin the other day without pain.     Objective:  Modalities:     Precautions: Fibromyalgia   Exercises:  Exercise     R ulnar nerve decompression  Reps/ Time Weight/ Level Comments             Pulley's  3'/3'    flexion, scaption             Standing wrist extension stretch  3x30\"       Standing wrist flexion stretch  3x30\"     Ulnar nerve glides  x10                Finger abd/add         Wrist flex x10 2# With BAND-IT brace     Wrist extension  x15 2# Pronation/supination  x20 2#     Putty squeeze            3-way bicep curls  x10  2#          Tband       Rows  2x10 Lime     Shoulder Ext  2x10 Lime     Bilat ER  2x10 Lime           Scapular retraction       Upper trap stretch  3x30\"     Pronation/Supination  Towel Twists        Other: MFR using the Hawkgrips to the R wrist flexor and medial biceps muscles, humeroulnar distraction to R elbow in supine 2'      Specific Instructions for next treatment: progress mobility, improve R  strength, wrist cock-up splint for night use       Treatment Charges: Mins Units   []  Modalities     [x]  Ther Exercise 29 2   [x]  Manual Therapy 15 1   []  Ther Activities     []  Aquatics     []  Vasocompression     []  Other     Total Treatment time 44 3       Assessment: [x] Progressing toward goals. Initiated treatment with pulleys in flexion and scaption followed by stretches. Patient has more soreness into her upper arm than previous visits. Continued with exercises per log with increased resistance and continued focus on wrist/elbow positions during exercises. Continued to stress positional awareness throughout her day at home. Assessed her positioning while spraying a spray bottle. Patient has increased pain to her anterior forearm, likely from overactivity and soreness from the session. Will assess at next visit the area of nerve compression and irritation and alter treatment plan accordingly. [] No change.      [] Other:  [x] Patient would continue to benefit from skilled physical therapy services in order to: improve R wrist/elbow ROM, improve R  strength, and decreased pain and numbness to ease ADL's and improve quality of life        Goals  MET NOT MET ON-  GOING  Details   Date Addressed:            STG: To be met in 6 treatments            1. ? Pain: Decrease R elbow pain levels to <3/10 with ADLs []  []  []      2. ? ROM: Increase wrist/elbow ROM to equal bilat to reduce difficulty with ADLs []  []  [] 3. ? Strength: Increase MMT to 5/5 without increase in pain throughout to ease functional limitations and mobility  []  []  []      4. Improve R hand  strength to at least 95% of the L hand []  []  []      5. Independent with Home Exercise Programs []  []  []        []  []  []      Date Addressed:            LTG: To be met in 8 treatments           1. Improve score on assessment tool Upper Extremity Functional Scale from 37% impairment to less than <20% impairment  []  []  []      2. Reduce pain levels to <2/10 or less with ADLs []  []  []                        Patient goals: decrease pain     Pt. Education:  [x] Yes  [] No  [x] Reviewed Prior HEP/Ed  Method of Education: [x] Verbal  [x] Demo  [x] Written    Comprehension of Education:  [x] Verbalizes understanding. [x] Demonstrates understanding. [x] Needs review. [x] Demonstrates/verbalizes HEP/Ed previously given. Plan: [x] Continue current frequency toward long and short term goals.     [x] Specific Instructions for subsequent treatments: progress mobility, improve R  strength, humeroulnar distraction, radioulnar glides, MFR to wrist flexors     Time In: 1:55pm            Time Out: 2:45pm    Electronically signed by:  Keith Eaton PT

## 2021-10-01 ENCOUNTER — HOSPITAL ENCOUNTER (OUTPATIENT)
Dept: PHYSICAL THERAPY | Facility: CLINIC | Age: 31
Setting detail: THERAPIES SERIES
Discharge: HOME | End: 2021-10-01
Payer: OTHER GOVERNMENT

## 2021-10-01 PROCEDURE — 97110 THERAPEUTIC EXERCISES: CPT

## 2021-10-01 NOTE — FLOWSHEET NOTE
[] [facility]) - [facility] &  Therapy  [address]  [phone]  F: [phone] [] [address]  [facility]   [address]  P: [phone]  F: [phone] [] [address] &  Therapy  [address]  P: [phone]  F: [phone] [] [address]  P: [phone]  F: [phone] [x] [facility]  Outpatient Rehabilitation &  Therapy  [facility]   [address]   [place]: [phone]  F: [phone]      Physical Therapy Daily Treatment Note     Date:  10/1/2021  Patient Name:  Ananda Groves    :  1990  MRN: [id number]  Visit# / Tom Diane: Dr. Brandon Ernst: Artur Chappell (18 visits approved until 21, [facility] #[phone])   Medical Diagnosis: Neuritis of right ulnar nerve, Ulnar nerve compression, right    Rehab Codes: G56.21, G56.21   Onset Date: 2021                                   Next 's appt: n/a   Total visits:      Cancels/No Shows: 0/0    Subjective:    Pain:  [] Yes  [x] No Location: R elbow Pain Rating: (0-10 scale) 6-7/10  Pain altered Tx:  [x] No  [] Yes  Action:  Comments: Pt arrives with no pain complaints this morning. She reports mild bruising to her R forearm following the use of HawkGrips last session. Objective:  Modalities:     Precautions: Fibromyalgia   Exercises:  Exercise     R ulnar nerve decompression  Reps/ Time Weight/ Level Comments             Pulley's  3'/3'    flexion, scaption          bike next?     Standing wrist extension stretch  3x30\"       Corner Stretch 3x30\"     Ulnar nerve glides                  Finger abd/add         Wrist flex x15 2#    Wrist extension  x15 2#     Pronation/supination x20 2#     Putty squeeze            3-way bicep curls  x10  3#          Tband       Rows  2x10 Lime Shoulder Ext  2x10 Lime     Bilat ER  2x10 Lime           Upper trap stretch  3x30\"     Pronation/Supination  Towel Twists        Prone Retraction  x10     Prone depression  x10      Prone Shoulder ext  2x10  A From neutral    Other: R Shoulder and elbow PROM, passive stretching into pronation and supination  passive triceps stretch 3x30\"       Specific Instructions for next treatment: progress mobility, improve R  strength, wrist cock-up splint for night use?, progress scapular stabilization       Treatment Charges: Mins Units   []  Modalities     [x]  Ther Exercise 39 3   [x]  Manual Therapy 5 --   []  Ther Activities     []  Aquatics     []  Vasocompression     []  Other     Total Treatment time 44 3       Assessment: [x] Progressing toward goals. Initiated treatment with pulleys in flexion and scaption followed by stretches. Patient has more soreness into her upper arm than previous visits. Continued with exercises per log with increased resistance and continued focus on wrist/elbow positions during exercises. Initiated prone scapular exercises with tactile and verbal feedback required to get full scapular retraction ROM. Held MFR to the forearm this date. Minimal R pec minor tightness noted during passive shoulder ROM. She has no pain during or after session. [] No change. [] Other:  [x] Patient would continue to benefit from skilled physical therapy services in order to: improve R wrist/elbow ROM, improve R  strength, and decreased pain and numbness to ease ADL's and improve quality of life        Goals  MET NOT MET ON-  GOING  Details   Date Addressed:            STG: To be met in 6 treatments            1. ? Pain: Decrease R elbow pain levels to <3/10 with ADLs []  []  []      2. ? ROM: Increase wrist/elbow ROM to equal bilat to reduce difficulty with ADLs []  []  []      3. ? Strength:  Increase MMT to 5/5 without increase in pain throughout to ease functional limitations and mobility  [] []  []      4. Improve R hand  strength to at least 95% of the L hand []  []  []      5. Independent with Home Exercise Programs []  []  []        []  []  []      Date Addressed:            LTG: To be met in 8 treatments           1. Improve score on assessment tool Upper Extremity Functional Scale from 37% impairment to less than <20% impairment  []  []  []      2. Reduce pain levels to <2/10 or less with ADLs []  []  []                        Patient goals: decrease pain     Pt. Education:  [x] Yes  [] No  [x] Reviewed Prior HEP/Ed  Method of Education: [x] Verbal  [x] Demo  [x] Written  Comprehension of Education:  [x] Verbalizes understanding. [x] Demonstrates understanding. [x] Needs review. [x] Demonstrates/verbalizes HEP/Ed previously given. Plan: [x] Continue current frequency toward long and short term goals.     [x] Specific Instructions for subsequent treatments: progress mobility, improve R  strength, humeroulnar distraction, radioulnar glides, MFR to wrist flexors     Time In: 10:00am            Time Out: 10:50am    Electronically signed by:  Pieter Pack, PT

## 2021-10-05 ENCOUNTER — HOSPITAL ENCOUNTER (OUTPATIENT)
Dept: PHYSICAL THERAPY | Facility: CLINIC | Age: 31
Setting detail: THERAPIES SERIES
Discharge: HOME | End: 2021-10-05
Payer: OTHER GOVERNMENT

## 2021-10-05 PROCEDURE — 97140 MANUAL THERAPY 1/> REGIONS: CPT

## 2021-10-05 PROCEDURE — 97110 THERAPEUTIC EXERCISES: CPT

## 2021-10-05 NOTE — FLOWSHEET NOTE
[] [facility] &  Therapy  [address]  [phone]  F: [phone] [] [address]  [address]   [address]  P: [phone]  F: [phone] [] [address] &  Therapy  [address]  P: [phone]  F: [phone] [] [address]  P: [phone]  F: [phone] [x] [facility]  Outpatient Rehabilitation &  Therapy  [facility]   [address]   [place]: [phone]  F: [phone]      Physical Therapy Daily Treatment Note     Date:  10/5/2021  Patient Name:  Antoine Gaytan    :  1990  MRN: [id number]  Visit# / Dylan Grimm: Dr. Ada Smith: Debbie Don (18 visits approved until 21, [facility] #[id number])   Medical Diagnosis: Neuritis of right ulnar nerve, Ulnar nerve compression, right    Rehab Codes: G56.21, G56.21   Onset Date: 2021                                   Next 's appt: n/a   Total visits:      Cancels/No Shows: 0/0    Subjective:    Pain:  [] Yes  [x] No Location: R elbow Pain Rating: (0-10 scale) 3-4/10  Pain altered Tx:  [x] No  [] Yes  Action:  Comments: Pt arrives with mild pain to her R forearm she believes may be from the Hawkgrips used last week. She has been lifting more things at home without an increase in pain.      Objective:  Modalities:     Precautions: Fibromyalgia   Exercises:  Exercise     R ulnar nerve decompression  Reps/ Time Weight/ Level Comments             Pulley's      flexion, scaption    Bike   5'      Standing wrist extension stretch  3x30\"       Standing wrist flexion stretch  3x30\"     Corner Stretch 3x30\"     Ulnar nerve glides  x10   Encouraged increased shoulder horiz abd              Finger abd/add         Wrist flex      Wrist extension        Pronation/supination Putty squeeze            3-way bicep curls             Tband       Rows  2x10 Lime     Shoulder Ext  2x10 Lime     Bilat ER  2x10 Lime           Upper trap stretch  3x30\"     Pronation/Supination  Towel Twists              Prone Bench    Added 10/6    Prone Retraction  x15     Prone Rows  x15     Prone Shoulder ext  x15      Horiz abd  x15      Scaption  x15                  Other: R Shoulder PROM x5 into each flexion, IR, ER  passive triceps stretch 4x30\" in sitting   pec minor release on R via direct inhibition        Specific Instructions for next treatment: progress mobility, improve R  strength, progress scapular stabilization     Somatic Dysfunctions Normal Deficit Details   Cervical   [] []    Thoracic   [] [x] FRSL T4-T7 MOB    Rib   [] [x] Posterior L ribs 4-7    FRS=flexed, rotated, side-bent  ERS=extended, rotated, side-bent   MOB=Mobilization  MFR=Myofascial Release  MET=Muscle Energy Technique  MFR=Myofascial Release          STRENGTH     Left Right   C5 Shld Abd 4+ 4+   Shld Flexion 5 5   Shld IR       Shld ER       Shld HAB       Shld Ext       C6 Elb Flex 5 5   C7 Elb Ext 5 4   C8 EPL       T1 Fing Abd        L  42  42  35  Av# R  50  52  42  avg = 48#                    AROM     Left Right   Shld Abd [facility] WFL   Sh[facility] Flex [facility] W[facility]   Shld IR       Shld ER       Shld HAB                    Elbow Flex  WFL   Elbow Ext  WFL   Supination  90   Pronation  74          Wrist flex   74   Wrist ext  56   Wrist UD  40   Wrist RD  24         Treatment Charges: Mins Units   []  Modalities     [x]  Ther Exercise 30 2   [x]  Manual Therapy 10 1   []  Ther Activities     []  Aquatics     []  Vasocompression     []  Other     Total Treatment time 40 3       Assessment: [x] Progressing toward goals. Initiated treatment with pulleys in flexion and scaption followed by stretches. Patient has more soreness into her upper arm than previous visits.  Continued with exercises per log with increased resistance and continued focus on wrist/elbow positions during exercises. Initiated prone scapular exercises with tactile and verbal feedback required to get full scapular retraction ROM. Held MFR to the forearm this date. Minimal R pec minor tightness noted during passive shoulder ROM. She has no pain during or after session. [] No change. [x] Other:  strength on R is improved since initial visit, though decreased on the L. Supination/pronation AROM has significantly improved from 68/46 to 90/74 pronation/supination respectively. She has decreased pain and has gradually increased use of her LUE at home. Frequency of numbness has gradually decreased since onset. [x] Patient would continue to benefit from skilled physical therapy services in order to: improve R wrist/elbow ROM, improve R  strength, and decreased pain and numbness to ease ADL's and improve quality of life        Goals  MET NOT MET ON-  GOING  Details   Date Addressed: 10/5/21           STG: To be met in 6 treatments            1. ? Pain: Decrease R elbow pain levels to <3/10 with ADLs []  []  [x]      2. ? ROM: Increase wrist/elbow ROM to equal bilat to reduce difficulty with ADLs [x]  []  []  See ROM measurements above    3. ? Strength: Increase MMT to 5/5 without increase in pain throughout to ease functional limitations and mobility  []  []  [x]  Decrease in scapular stabilization strength    4. Improve R hand  strength to at least 95% of the L hand []  []  [x]      5. Independent with Home Exercise Programs [x]  []  []        []  []  []      Date Addressed:            LTG: To be met in 8 treatments           1. Improve score on assessment tool Upper Extremity Functional Scale from 37% impairment to less than <20% impairment  []  []  []      2. Reduce pain levels to <2/10 or less with ADLs []  []  []                        Patient goals: decrease pain     Pt.  Education:  [x] Yes  [] No  [x] Reviewed Prior HEP/Ed  Method of Education: [x] Verbal  [x] Demo  [x] Written   Continue previous HEP with addition of those below. Access Code: [email or web address]  URL: [email or web address]  Date: 10/05/2021  Prepared by: Leonard Parra    Exercises  Prone Middle Trapezius with Legs Straight on Swiss Ball - 1 x daily - 7 x weekly - 3 sets - 10 reps  Prone Shoulder Extension on Swiss Ball - 1 x daily - 7 x weekly - 3 sets - 10 reps  Prone Scapular Retraction on Swiss Ball - 1 x daily - 7 x weekly - 3 sets - 10 reps  Doorway Pec Stretch at 120 Degrees Abduction - 1 x daily - 7 x weekly - 10 reps - 30 hold  Doorway Pec Stretch at 90 Degrees Abduction - 1 x daily - 7 x weekly - 10 reps - 30 hold      Comprehension of Education:  [x] Verbalizes understanding. [x] Demonstrates understanding. [x] Needs review. [x] Demonstrates/verbalizes HEP/Ed previously given. Plan: [x] Continue current frequency toward long and short term goals.     [x] Specific Instructions for subsequent treatments: progress mobility, improve R  strength, humeroulnar distraction, radioulnar glides, MFR to wrist flexors     Time In: 1700            Time Out: 1745    Electronically signed by:  Leonard Parra, PT

## 2021-10-08 ENCOUNTER — HOSPITAL ENCOUNTER (OUTPATIENT)
Dept: PHYSICAL THERAPY | Facility: CLINIC | Age: 31
Setting detail: THERAPIES SERIES
Discharge: HOME | End: 2021-10-08
Payer: OTHER GOVERNMENT

## 2021-10-08 PROCEDURE — 97140 MANUAL THERAPY 1/> REGIONS: CPT

## 2021-10-08 PROCEDURE — 97110 THERAPEUTIC EXERCISES: CPT

## 2021-10-08 NOTE — FLOWSHEET NOTE
[] [facility]) [facility] &  Therapy  [address]  [phone]  F: [phone] [] [address]  [facility]   [address]  P: [phone]  F: [phone] [] [address] &  Therapy  [address]  P: [phone]  F: [phone] [] [address]  P: [phone]  F: [phone] [x] [facility]  Outpatient Rehabilitation &  Therapy  [facility]   [address]   [place]: [phone]  F: [phone]      Physical Therapy Daily Treatment Note     Date:  10/8/2021  Patient Name:  Karma Meek    :  1990  MRN: [id number]  Visit# / Mara Filler: Dr. Elizabeth An: Rosario Gallardo (18 visits approved until 21, [facility] #[id number])   Medical Diagnosis: Neuritis of right ulnar nerve, Ulnar nerve compression, right    Rehab Codes: G56.21, G56.21   Onset Date: 2021                                   Next 's appt: n/a   Total visits: 10/18     Cancels/No Shows: 0/0    Subjective:    Pain:  [] Yes  [x] No Location: R elbow Pain Rating: (0-10 scale) 3-4/10  Pain altered Tx:  [x] No  [] Yes  Action:  Comments: Pt arrives with mild pain, but overall seeing gradual improvements.      Objective:  Modalities:     Precautions: Fibromyalgia   Exercises:  Exercise     R ulnar nerve decompression  Reps/ Time Weight/ Level Comments             Pulley's      flexion, scaption    Bike   5'      Standing wrist extension stretch  3x30\"       Standing wrist flexion stretch  3x30\"     Corner Stretch 3x30\"  At 90 and 120 degrees abd   Ulnar nerve glides  x10   Encouraged increased shoulder horiz abd              Finger abd/add  x15       Wrist flex      Wrist extension  x15 2#     Pronation/supination x15 2#     Putty squeeze            Bicep curls  x12 4# bilat  Neutral wrist          Tband       Rows  2x10 Blue     Shoulder Ext  x8 Blue     Bilat ER  x10      Triceps pull downs  x15 Blue     Upper trap stretch  3x30\"     Pronation/Supination  Towel Twists              Prone Bench    Added 10/6    Prone Retraction  x15     Prone Rows  x15     Prone Shoulder ext  x15      Horiz abd  x15      Scaption                    Other: R triceps MFR using Hawkgrips followed by active triceps contractions x10      MFR to the R sided thoracic paraspinals including rhomboid and middle trap     Specific Instructions for next treatment: progress mobility, improve R  strength, progress scapular stabilization     Somatic Dysfunctions Normal Deficit Details   Cervical   [] []    Thoracic   [] [x]    Rib   [] [x] Posterior R ribs 6-8 MOB     FRS=flexed, rotated, side-bent  ERS=extended, rotated, side-bent   MOB=Mobilization  MFR=Myofascial Release  MET=Muscle Energy Technique  MFR=Myofascial Release        Treatment Charges: Mins Units   []  Modalities     [x]  Ther Exercise 35 2   [x]  Manual Therapy 12 1   []  Ther Activities     []  Aquatics     []  Vasocompression     []  Other     Total Treatment time 47 3       Assessment: [x] Progressing toward goals. Initiated treatment with arms press/pulls on the bike. Continued to focus on scapular retraction strength and postural education was provided thoughout session. Progressed resistance to the band resistance exercises with patient completing limited repetitions secondary to quick fatigue. Triceps tightness and mid-back tightness was addressed with MFR. Patient reports mild soreness in her R triceps at the end of session. [] No change. [x] Other:  strength on R is improved since initial visit, though decreased on the L. Supination/pronation AROM has significantly improved from 68/46 to 90/74 pronation/supination respectively. She has decreased pain and has gradually increased use of her LUE at home. Frequency of numbness has gradually decreased since onset. [x] Patient would continue to benefit from skilled physical therapy services in order to: improve R wrist/elbow ROM, improve R  strength, and decreased pain and numbness to ease ADL's and improve quality of life        Goals  MET NOT MET ON-  GOING  Details   Date Addressed: 10/5/21           STG: To be met in 6 treatments            1. ? Pain: Decrease R elbow pain levels to <3/10 with ADLs []  []  [x]      2. ? ROM: Increase wrist/elbow ROM to equal bilat to reduce difficulty with ADLs [x]  []  []  See ROM measurements above    3. ? Strength: Increase MMT to 5/5 without increase in pain throughout to ease functional limitations and mobility  []  []  [x]  Decrease in scapular stabilization strength    4. Improve R hand  strength to at least 95% of the L hand []  []  [x]      5. Independent with Home Exercise Programs [x]  []  []        []  []  []      Date Addressed:            LTG: To be met in 8 treatments           1. Improve score on assessment tool Upper Extremity Functional Scale from 37% impairment to less than <20% impairment  []  []  []      2. Reduce pain levels to <2/10 or less with ADLs []  []  []                        Patient goals: decrease pain     Pt. Education:  [x] Yes  [] No  [x] Reviewed Prior HEP/Ed  Method of Education: [x] Verbal  [x] Demo  [x] Written    Access Code: [facility])  URL: [email or web address]  Date: 10/08/2021  Prepared by: Phil Holland    Exercises  Standing Overhead Triceps Stretch - 2-3 x daily - 7 x weekly - 3 sets - 30 hold    Comprehension of Education:  [x] Verbalizes understanding. [x] Demonstrates understanding. [x] Needs review. [x] Demonstrates/verbalizes HEP/Ed previously given. Plan: [x] Continue current frequency toward long and short term goals.     [x] Specific Instructions for subsequent treatments: progress mobility, improve R  strength, humeroulnar distraction, radioulnar glides, MFR to wrist flexors Time In: 14:02            Time Out: 14:54    Electronically signed by:  Veda Eugene, PT

## 2021-10-12 ENCOUNTER — HOSPITAL ENCOUNTER (OUTPATIENT)
Dept: PHYSICAL THERAPY | Facility: CLINIC | Age: 31
Setting detail: THERAPIES SERIES
Discharge: HOME | End: 2021-10-12
Payer: OTHER GOVERNMENT

## 2021-10-15 ENCOUNTER — HOSPITAL ENCOUNTER (OUTPATIENT)
Dept: PHYSICAL THERAPY | Facility: CLINIC | Age: 31
Setting detail: THERAPIES SERIES
Discharge: HOME | End: 2021-10-15
Payer: OTHER GOVERNMENT

## 2021-10-15 PROCEDURE — 97110 THERAPEUTIC EXERCISES: CPT

## 2021-10-15 NOTE — FLOWSHEET NOTE
[] [facility] &  Therapy  [address]  [phone]  F: [phone] [] [address]  [facility]   [address]  P: [phone]  F: [phone] [] [facility] &  Therapy  [address]  P: [phone]  F: [phone] [] [address]  P: [phone]  F: [phone] [x] [address]  [facility]   [address]   [place]: [phone]  F: [phone]      Physical Therapy Daily Treatment Note      Date:  10/15/2021  Patient Name:  Ayana Chapman    :  1990  MRN: [id number]  Visit# / Helen Avery: Dr. Mccloud Valerio: Sourav Luna (18 visits approved until 21, Nakul Rinaldi #[id number])   Medical Diagnosis: Neuritis of right ulnar nerve, Ulnar nerve compression, right    Rehab Codes: G56.21, G56.21   Onset Date: 2021                                   Next 's appt: n/a   Total visits:      Cancels/No Shows: 0/0    Subjective:    Pain:  [] Yes  [x] No Location: R elbow Pain Rating: (0-10 scale) 0/10  Pain altered Tx:  [x] No  [] Yes  Action:  Comments: Pt arrives with no pain complaints this date. She reports that she was able to clean her house using a spray bottle without having any pain into her arm.       Objective:  Modalities:     Precautions: Fibromyalgia   Exercises:  Exercise     R ulnar nerve decompression  Reps/ Time Weight/ Level Comments             Louey's      flexion, scaption    Bike   5'      Standing wrist extension stretch  3x30\"       Standing wrist flexion stretch  3x30\"     Pectoral Stretch 3x30\"  At 90 and 120 degrees abd  In Doorway this date    Ulnar nerve glides     Encouraged increased shoulder horiz abd     Triceps Stretch  3x30\"   R only    Finger abd/add  [employer] Wrist flex      Wrist extension  x15 3#     Pronation/supination x15 3#     Putty squeeze            Bicep curls  x17 3# bilat  Neutral wrist          Tband       Rows  2x10 Blue     Shoulder Ext  2x10  Blue     Bilat ER  2x10 Blue     Triceps pull downs  2x10  Blue     Upper trap stretch  3x30\"     Pronation/Supination  Towel Twists              Prone Bench    Added 10/6    Prone Retraction       Prone Rows  2x10 3#    Prone Shoulder ext  2x10  3#    Horiz abd  x10 1#    Scaption   x10 1#          Standing abduction  x18 1#     Other: R triceps MFR using Hawkgrips followed by active triceps contractions x10  - HELD     MFR to the R sided thoracic paraspinals including rhomboid and middle trap - HELD     Specific Instructions for next treatment: progress mobility, improve R  strength, progress scapular stabilization         Treatment Charges: Mins Units   []  Modalities     [x]  Ther Exercise 42 3   []  Manual Therapy     []  Ther Activities     []  Aquatics     []  Vasocompression     []  Other     Total Treatment time 42 3       Assessment: [x] Progressing toward goals. Initiated treatment with arms press/pulls on the bike. Due to patient having minimal pain complaints this date, focus on scapular and UE strengthening. Added weight to prone program and progressed resistance band with good tolerance. She was provided with Blue band for home use. Intermittent cueing on  position with the added weight, good carryover. Patient with mild soreness at end of session. Decreasing frequency to 1x/week with patient encouraged to complete HEP daily. [] No change.     [] Other:   [x] Patient would continue to benefit from skilled physical therapy services in order to: improve R wrist/elbow ROM, improve R  strength, and decreased pain and numbness to ease ADL's and improve quality of life        Goals  MET NOT MET ON-  GOING  Details   Date Addressed: 10/5/21           STG: To be met in 6 treatments 1. ? Pain: Decrease R elbow pain levels to <3/10 with ADLs []  []  [x]      2. ? ROM: Increase wrist/elbow ROM to equal bilat to reduce difficulty with ADLs [x]  []  []  See ROM measurements above    3. ? Strength: Increase MMT to 5/5 without increase in pain throughout to ease functional limitations and mobility  []  []  [x]  Decrease in scapular stabilization strength    4. Improve R hand  strength to at least 95% of the L hand []  []  [x]      5. Independent with Home Exercise Programs [x]  []  []        []  []  []      Date Addressed:            LTG: To be met in 8 treatments           1. Improve score on assessment tool Upper Extremity Functional Scale from 37% impairment to less than <20% impairment  []  []  []      2. Reduce pain levels to <2/10 or less with ADLs []  []  []                        Patient goals: decrease pain     Pt. Education:  [x] Yes  [] No  [x] Reviewed Prior HEP/Ed  Method of Education: [x] Verbal  [x] Demo  [x] Written  Provided blue Tband for home use. Continue previous HEP. Comprehension of Education:  [x] Verbalizes understanding. [x] Demonstrates understanding. [x] Needs review. [x] Demonstrates/verbalizes HEP/Ed previously given. Plan: [] Continue current frequency toward long and short term goals. [x]  Decrease frequency to 1x/week to prepare patient for discharge as appropriate.      [x] Specific Instructions for subsequent treatments: progress and scapular strength     Time In: 08:02            Time Out: 08:49    Electronically signed by:  Paulo Heredia, PT

## 2021-10-22 ENCOUNTER — HOSPITAL ENCOUNTER (OUTPATIENT)
Dept: PHYSICAL THERAPY | Facility: CLINIC | Age: 31
Setting detail: THERAPIES SERIES
Discharge: HOME | End: 2021-10-22
Payer: OTHER GOVERNMENT

## 2021-10-22 PROCEDURE — 97110 THERAPEUTIC EXERCISES: CPT

## 2021-10-22 NOTE — FLOWSHEET NOTE
Pronation/supination       Putty squeeze            3-way bicep curls  x16 3# bilat  Neutral wrist          Tband       Rows  2x10 Blue     Shoulder Ext  2x10  Blue     Bilat ER  2x10 Blue     Triceps pull downs  2x10  Blue     Upper trap stretch  3x30\"     Pronation/Supination  Towel Twists              Cable Column    Using rope    Rows  x10 48#    Extension  x10  48#    Triceps pull downs  x10 48#           TRX Rows  x20           Prone Bench    Added 10/6    Prone Retraction       Prone Rows  2x10 3#    Prone Shoulder ext  2x10  3#    Horiz abd  x20 3#    Scaption  x15           Standing abduction  x18 3#     Standing scaption  x18 3#          Counter push ups  x10   Poor core stability    Chest press  x20  Blue ball     Other: R triceps MFR using Hawkgrips followed by active triceps contractions x10  - HELD     MFR to the R sided thoracic paraspinals including rhomboid and middle trap - HELD     Specific Instructions for next treatment: progress mobility, improve R  strength, progress scapular stabilization         Treatment Charges: Mins Units   []  Modalities     [x]  Ther Exercise 40 3   []  Manual Therapy     []  Ther Activities     []  Aquatics     []  Vasocompression     []  Other     Total Treatment time 40 3       Assessment: [x] Progressing toward goals. Continued to progress patient's strength with increasing resistance and adding dynamic scapular strength with push-up and TRX rows. Good technique, though patient was noted to have poor core stabilization requiring external cueing to correct. Mild discomfort to the R wrist with counter push-ups that was resolved after completion of wrist stretches. To continue to progress strength program and prepare the patient for discharge in the next few weeks. [] No change.     [] Other:   [x] Patient would continue to benefit from skilled physical therapy services in order to: improve R wrist/elbow ROM, improve R  strength, and decreased pain and numbness to ease ADL's and improve quality of life        Goals  MET NOT MET ON-  GOING  Details   Date Addressed: 10/5/21           STG: To be met in 6 treatments            1. ? Pain: Decrease R elbow pain levels to <3/10 with ADLs []  []  [x]      2. ? ROM: Increase wrist/elbow ROM to equal bilat to reduce difficulty with ADLs [x]  []  []  See ROM measurements above    3. ? Strength: Increase MMT to 5/5 without increase in pain throughout to ease functional limitations and mobility  []  []  [x]  Decrease in scapular stabilization strength    4. Improve R hand  strength to at least 95% of the L hand []  []  [x]      5. Independent with Home Exercise Programs [x]  []  []        []  []  []      Date Addressed:            LTG: To be met in 8 treatments           1. Improve score on assessment tool Upper Extremity Functional Scale from 37% impairment to less than <20% impairment  []  []  []      2. Reduce pain levels to <2/10 or less with ADLs []  []  []                        Patient goals: decrease pain     Pt. Education:  [x] Yes  [] No  [x] Reviewed Prior HEP/Ed  Method of Education: [x] Verbal  [x] Demo  [x] Written  Provided blue Tband for home use. Continue previous HEP. Comprehension of Education:  [x] Verbalizes understanding. [x] Demonstrates understanding. [x] Needs review. [x] Demonstrates/verbalizes HEP/Ed previously given. Plan: [x] Continue current frequency (1x/week) toward long and short term goals. []  Decrease frequency to 1x/week to prepare patient for discharge as appropriate.      [x] Specific Instructions for subsequent treatments: progress scapular strength     Time In: 09:15            Time Out: 09:58    Electronically signed by:  Haze Curling, PT

## 2021-10-29 ENCOUNTER — HOSPITAL ENCOUNTER (OUTPATIENT)
Dept: PHYSICAL THERAPY | Facility: CLINIC | Age: 31
Setting detail: THERAPIES SERIES
Discharge: HOME | End: 2021-10-29
Payer: OTHER GOVERNMENT

## 2021-10-29 ENCOUNTER — VIRTUAL VISIT (OUTPATIENT)
Dept: FAMILY MEDICINE CLINIC | Age: 31
End: 2021-10-29
Payer: OTHER GOVERNMENT

## 2021-10-29 DIAGNOSIS — M77.01 MEDIAL EPICONDYLITIS OF ELBOW, RIGHT: ICD-10-CM

## 2021-10-29 DIAGNOSIS — K59.01 SLOW TRANSIT CONSTIPATION: ICD-10-CM

## 2021-10-29 DIAGNOSIS — F41.9 ANXIETY AND DEPRESSION: Primary | ICD-10-CM

## 2021-10-29 DIAGNOSIS — F32.A ANXIETY AND DEPRESSION: Primary | ICD-10-CM

## 2021-10-29 PROCEDURE — 97110 THERAPEUTIC EXERCISES: CPT

## 2021-10-29 PROCEDURE — 99212 OFFICE O/P EST SF 10 MIN: CPT | Performed by: NURSE PRACTITIONER

## 2021-10-29 ASSESSMENT — ENCOUNTER SYMPTOMS
COUGH: 0
CONSTIPATION: 0
DIARRHEA: 0
EYES NEGATIVE: 1
ABDOMINAL PAIN: 0
VOMITING: 0
RESPIRATORY NEGATIVE: 1
NAUSEA: 0
BACK PAIN: 0
SHORTNESS OF BREATH: 0

## 2021-10-29 NOTE — FLOWSHEET NOTE
[] [facility] &  Therapy  [address]  [phone]  F: [phone] [] [address]  [facility]   [address]  P: [phone]  F: [phone] [] [address] &  Therapy  [address]  P: [phone]  F: [phone] [] [address]  P: [phone]  F: [phone] [x] [facility]  Outpatient Rehabilitation &  Therapy  [facility]   [place]: [phone]  F: [phone]      Physical Therapy Daily Treatment Note      Date:  10/29/2021  Patient Name:  Juancho Womack    :  1990  MRN: [id number]  Visit# / Yancy Bullock: Dr. Ashu Dotson: Radha Dunham (18 visits approved until 21, Silvano Jolley #[id number])   Medical Diagnosis: Neuritis of right ulnar nerve, Ulnar nerve compression, right    Rehab Codes: G56.21, G56.21    Onset Date: 2021                                   Next 's appt: n/a   Total visits:                                 Cancels/No Shows: 0/0    Subjective:    Pain:  []? Yes  [x]? No   Location: R elbow       Pain Rating: (0-10 scale) 0/10  Pain altered Tx:  [x]? No  []? Yes  Action:  Comments: Pt arrives with no pain complaints.      Objective:  Modalities:     Precautions: Fibromyalgia    Exercises:  Exercise     R ulnar nerve decompression  Reps/ Time Weight/ Level Comments             Pulley's      flexion, scaption    Bike   5'      Standing wrist extension stretch  3x30\"       Standing wrist flexion stretch  3x30\"     Pectoral Stretch 3x30\"  At 90 and 120 degrees abd  In Doorway this date    Ulnar nerve glides     Encouraged increased shoulder horiz abd     Triceps Stretch  3x30\"   R only    3-way bicep curls  x16 4# bilat  Neutral wrist          Tband       Rows  2x10 Purple    Shoulder Ext  2x10 Purple     Bilat ER  2x10 Purple     Triceps pull downs  2x10  Purple    Upper trap stretch       Pronation/Supination  Towel Twists              Cable Column    Using rope    Rows       Extension       Triceps pull downs       PNF D2 flexion  2x9 12#          TRX Rows  x20           Prone Bench    Added 10/6    Prone Retraction       Prone Rows  x17 4#    Prone Shoulder ext  x20  2#    Horiz abd  x20 3#    Scaption  x15 2#          Standing abduction       Standing scaption             Counter push ups  2x11   Poor core stability    Chest press  2x20  Blue ball           Clock wall slides  x5 Green 1,3,5 o'clock direction   Other: R triceps MFR using Hawkgrips followed by active triceps contractions x10  - HELD     MFR to the R sided thoracic paraspinals including rhomboid and middle trap - HELD     Specific Instructions for next treatment: progress mobility, improve R  strength, progress scapular stabilization       Treatment Charges: Mins Units   []  Modalities     [x]  Ther Exercise 45 3   []  Manual Therapy     []  Ther Activities     []  Aquatics     []  Vasocompression     []  Other     Total Treatment time 45 3       Assessment: [x] Progressing toward goals. Continued previous program with progression in weight and resistance. No pain with progressions, though she needs intermittent rest breaks secondary to fatigue. To continue to progress strength program and prepare the patient for discharge in the next few weeks. [] No change.     [] Other:   [x] Patient would continue to benefit from skilled physical therapy services in order to: improve R wrist/elbow ROM, improve R  strength, and decreased pain and numbness to ease ADL's and improve quality of life        Goals  MET NOT MET ON-  GOING  Details   Date Addressed: 10/5/21           STG: To be met in 6 treatments            1. ? Pain: Decrease R elbow pain levels to <3/10 with ADLs []  []  [x]      2. ? ROM: Increase wrist/elbow ROM to equal bilat to reduce difficulty with ADLs [x]  []  []  See ROM measurements above    3. ? Strength: Increase MMT to 5/5 without increase in pain throughout to ease functional limitations and mobility  []  []  [x]  Decrease in scapular stabilization strength    4. Improve R hand  strength to at least 95% of the L hand []  []  [x]      5. Independent with Home Exercise Programs [x]  []  []        []  []  []      Date Addressed:            LTG: To be met in 8 treatments           1. Improve score on assessment tool Upper Extremity Functional Scale from 37% impairment to less than <20% impairment  []  []  []      2. Reduce pain levels to <2/10 or less with ADLs []  []  []                        Patient goals: decrease pain     Pt. Education:  [x] Yes  [] No  [x] Reviewed Prior HEP/Ed  Method of Education: [x] Verbal  [x] Demo  [x] Written  Provided purple Tband for home use. Continue previous HEP. Access Code: [email or web address]  URL: [email or web address]  Date: 10/29/2021  Prepared by: Jose M Minaya    Exercises  Shoulder PNF D2 with Resistance - 1 x daily - 7 x weekly - 3 sets - 15 reps  Standing Shoulder Single Arm PNF D2 Extension with Anchored Resistance - 1 x daily - 7 x weekly - 3 sets - 15 reps      Comprehension of Education:  [x] Verbalizes understanding. [x] Demonstrates understanding. [x] Needs review. [x] Demonstrates/verbalizes HEP/Ed previously given. Plan: [x] Continue current frequency (1x/week) toward long and short term goals. []  Decrease frequency to 1x/week to prepare patient for discharge as appropriate.      [x] Specific Instructions for subsequent treatments: progress scapular strength     Time In: 09:15            Time Out: 09:58    Electronically signed by:  Jose M Minaya PT

## 2021-10-29 NOTE — PROGRESS NOTES
Visit Information    Have you changed or started any medications since your last visit including any over-the-counter medicines, vitamins, or herbal medicines? no   Have you stopped taking any of your medications? Is so, why? -  no  Are you having any side effects from any of your medications? - no    Have you seen any other physician or provider since your last visit?  no   Have you had any other diagnostic tests since your last visit?  no   Have you been seen in the emergency room and/or had an admission in a hospital since we last saw you?  no   Have you had your routine dental cleaning in the past 6 months?  yes      Do you have an active MyChart account? If no, what is the barrier?   Yes    Patient Care Team:  ALEX Alfred CNP as PCP - General (Nurse Practitioner)  ALEX Alfred CNP as PCP - [facility]    Medical History Review  Past Medical, Family, and Social History reviewed and does contribute to the patient presenting condition    Health Maintenance   Topic Date Due    Hepatitis C screen  Never done    Varicella vaccine (1 of 2 - 2-dose childhood series) Never done    Cervical cancer screen  04/16/2021    COVID-19 Vaccine (2 - Pfizer 2-dose series) 10/29/2021    DTaP/Tdap/Td vaccine (3 - Td or Tdap) 03/14/2028    Flu vaccine  Completed    HIV screen  Completed    Hepatitis A vaccine  Aged Out    Hepatitis B vaccine  Aged Out    Hib vaccine  Aged Out    Meningococcal (ACWY) vaccine  Aged Out    Pneumococcal 0-64 years Vaccine  Aged Out

## 2021-10-29 NOTE — PROGRESS NOTES
[facility]  [facility]  [address]  [facility] [address]  Dept: [phone]    10/29/2021    TELEHEALTH EVALUATION -- Audio/Visual (During MOUVT-88 public health emergency)  Liza Love (:  1990) has requested an audio/video evaluation for the following concern(s):    HPI:  Appointment to f/u on Anxiety and depression. Anxiety/Depression- continues going to counseling which is still very helpful. The counselor is helping her better manage her son's ODD and ADHD. Her son is also attending counseling with the same counselor which she finds to be beneficial.  Her son is getting multiple therapies at this time which is helping both of them. Continues working on self-care. Continues to deny needing medication at this time. Right elbow pain- She has almost completed her PT course and is doing well. She is being mindful of reintroducing activities to ensure that she is not aggravating her elbow again. Constipation and bloatingshe is starting to take a 50 billion women's probiotic. She feels much better since starting it. Patient-Reported Vitals 10/29/2021   Patient-Reported Weight 144lb   Patient-Reported Height 5' 2\"   Patient-Reported Systolic -   Patient-Reported Diastolic -   Patient-Reported Pulse -   Patient-Reported Temperature -   Patient-Reported SpO2 -   Patient-Reported Peak Flow -        There were no vitals filed for this visit. Wt Readings from Last 3 Encounters:   21 156 lb 12.8 oz (71.1 kg)   21 153 lb (69.4 kg)   20 158 lb (71.7 kg)     BP Readings from Last 3 Encounters:   21 106/83   21 118/76   21 118/78       REVIEW OF SYSTEMS:   Review of Systems   Constitutional: Negative. Negative for appetite change, chills, fatigue and fever. HENT: Negative. Eyes: Negative. Negative for visual disturbance. Respiratory: Negative. Negative for cough and shortness of breath. Cardiovascular: Negative. Negative for chest pain and palpitations. Gastrointestinal: Negative for abdominal pain, constipation (Resolved with probiotic.), diarrhea, nausea and vomiting. Genitourinary: Negative. Negative for difficulty urinating, flank pain and hematuria. Musculoskeletal: Negative. Negative for back pain. Arthralgias:  Right elbow pain. Skin: Negative. Negative for rash. Neurological: Negative. Negative for dizziness and headaches. Hematological: Negative. Negative for adenopathy. Psychiatric/Behavioral: Positive for dysphoric mood. Negative for self-injury and suicidal ideas. The patient is nervous/anxious.         PAST MEDICAL HISTORY:    Past Medical History:   Diagnosis Date    Anxiety     Attention-deficit hyperactivity disorder 6/4/2021    Chronic low back pain     Chronic low back pain     Depression     Endometriosis     Fibromyalgia 2/5/2020    Gastroesophageal reflux disease without esophagitis 4/25/2021    HPV (human papilloma virus) anogenital infection     Hyperhidrosis     PTSD (post-traumatic stress disorder)        FAMILY HISTORY:    Family History   Problem Relation Age of Onset    Depression Mother     Anxiety Disorder Mother     Depression Father     Ovarian Cancer Maternal Aunt         great aunt     ADHD Brother     Other Brother         autism     Osteoarthritis Maternal Grandmother     Cancer Maternal Grandfather         skin    COPD Maternal Grandfather     Lung Cancer Maternal Grandfather     ADHD Brother     Depression Brother     No Known Problems Paternal Grandmother     Colon Cancer Paternal Grandfather         COD    Diabetes type 2  Maternal Aunt     ADHD Son     Other Son         ODD        SOCIAL HISTORY:    Social History     Socioeconomic History    Marital status:      Spouse name: None    Number of children: 1    Years of education: None    Highest education level: None   Occupational History    Occupation: stay at home mom    Tobacco Use    Smoking status: Former Smoker     Packs/day: 0.50     Years: 17.00     Pack years: 8.50     Types: Cigarettes     Quit date: 2018     Years since quitting: 3.8    Smokeless tobacco: Former User     Quit date: 2020   Vaping Use    Vaping Use: Former    Quit date: 3/25/2020    Substances: Always   Substance and Sexual Activity    Alcohol use: Yes     Comment: social     Drug use: No    Sexual activity: Yes     Partners: Male     Birth control/protection: Patch   Other Topics Concern    None   Social History Narrative    None     Social Determinants of Health     Financial Resource Strain: Low Risk     Difficulty of Paying Living Expenses: Not hard at all   Food Insecurity: No Food Insecurity    Worried About [address] in the Last Year: Never true    [address] in the Last Year: Never true   Transportation Needs:     Lack of Transportation (Medical):      Lack of Transportation (Non-Medical):    Physical Activity:     Days of Exercise per Week:     Minutes of Exercise per Session:    Stress:     Feeling of Stress :    Social Connections:     Frequency of Communication with Friends and Family:     Frequency of Social Gatherings with Friends and Family:     Attends Worship Services:     Active Member of Clubs or Organizations:     Attends Club or Organization Meetings:     Marital Status:    Intimate Partner Violence:     Fear of Current or Ex-Partner:     Emotionally Abused:     Physically Abused:     Sexually Abused:        SURGICAL HISTORY:    Past Surgical History:   Procedure Laterality Date     SECTION  2008    COLPOSCOPY      , 2011 x 2     ENDOMETRIAL BIOPSY  2020    results were WNL     TONSILLECTOMY      WISDOM TOOTH EXTRACTION         CURRENT MEDICATIONS:    Current Outpatient Medications   Medication Sig Dispense Refill    Bacillus Coagulans-Inulin (PROBIOTIC-PREBIOTIC PO) Take 2 tablets by mouth daily      Specialty Vitamins Products (ULTIMATE FAT BURNER PO) Take by mouth       No current facility-administered medications for this visit. ALLERGIES:   Allergies   Allergen Reactions    Codeine      Per genetic testing she has found that she over-metabolizes medication     Other Hives     Paprika     Turmeric Hives    Tylenol [Acetaminophen]      Per genetic testing she has found that she over-metabolizes medication     Vicodin [Hydrocodone-Acetaminophen]      Per genetic testing she has found that she over-metabolizes medication        PHYSICAL EXAM:   Physical Exam  Vitals reviewed. Constitutional:       General: She is not in acute distress. Appearance: Normal appearance. She is well-developed. She is not ill-appearing or toxic-appearing. HENT:      Head: Normocephalic. Right Ear: Hearing normal.      Left Ear: Hearing normal.      Mouth/Throat:      Lips: Pink. Eyes:      General: Lids are normal.      Conjunctiva/sclera: Conjunctivae normal.   Pulmonary:      Effort: Pulmonary effort is normal. No respiratory distress. Musculoskeletal:         General: Normal range of motion. Cervical back: Normal range of motion. Skin:     Findings: No rash. Neurological:      Mental Status: She is alert and oriented to person, place, and time. Mental status is at baseline. Coordination: Coordination is intact. Psychiatric:         Mood and Affect: Mood normal.         Behavior: Behavior normal. Behavior is cooperative. Thought Content: Thought content normal.         Judgment: Judgment normal.          ASSESSMENT/PLAN:  1. Anxiety and depression  Assessment & Plan:  Chronic. Stable. Encouraged to continued counseling, self-care and to notify office if symptoms worsen or fail to improve. Patient encouraged to try increasing physical activity again in the future, but to hold off at this time due to increased self-deprecating thoughts lately.   2. Medial epicondylitis of elbow, right  Assessment & Plan:    At goal, continue current medications, lifestyle modifications recommended, continue resting as able, utilizing ice as needed and complete physical therapy course. 3. Slow transit constipation  Assessment & Plan:   Well-controlled, Continue probiotic, adequate water intake and dietary modification       Return in about 6 months (around 4/29/2022) for depression, Anxiety. Liza Love is a 33 y.o. female being evaluated by a Virtual Visit (video visit) encounter to address concerns as mentioned above. A caregiver was present when appropriate. Due to this being a TeleHealth encounter (During JDIJL-09 public health emergency), evaluation of the following organ systems was limited: Vitals/Constitutional/EENT/Resp/CV/GI//MS/Neuro/Skin/Heme-Lymph-Imm. Pursuant to the emergency declaration under the [address] [place] [address], [address] [place] [address] authority and the "[employer]" and [employer] Act, this Virtual Visit was conducted with patient's (and/or legal guardian's) consent, to reduce the patient's risk of exposure to COVID-19 and provide necessary medical care. The patient (and/or legal guardian) has also been advised to contact this office for worsening conditions or problems, and seek emergency medical treatment and/or call 911 if deemed necessary. Services were provided through a video synchronous discussion virtually to substitute for in-person clinic visit. Patient and provider were located at their individual homes. --ALEX Monahan CNP on 10/29/2021 at 11:52 PM    (Please note that portions of this note were completed with a voice recognition program. Efforts were made to edit the dictations but occasionally words are mis-transcribed. )      An electronic signature was used to authenticate this note.

## 2021-10-30 NOTE — ASSESSMENT & PLAN NOTE
At goal, continue current medications, lifestyle modifications recommended, continue resting as able, utilizing ice as needed and complete physical therapy course.

## 2021-11-09 ENCOUNTER — APPOINTMENT (OUTPATIENT)
Dept: PHYSICAL THERAPY | Facility: CLINIC | Age: 31
End: 2021-11-09
Payer: OTHER GOVERNMENT

## 2021-11-10 ENCOUNTER — HOSPITAL ENCOUNTER (OUTPATIENT)
Dept: PHYSICAL THERAPY | Facility: CLINIC | Age: 31
Setting detail: THERAPIES SERIES
Discharge: HOME | End: 2021-11-10
Payer: OTHER GOVERNMENT

## 2021-11-10 PROCEDURE — 97110 THERAPEUTIC EXERCISES: CPT

## 2021-11-10 NOTE — FLOWSHEET NOTE
[x] [facility]  Outpatient Rehabilitation &  Therapy  [facility]   [place]: [phone]  [place]: [phone]      Physical Therapy Daily Treatment Note      Date:  11/10/2021  Patient Name:  Lamonte Limon    :  1990  MRN: [id number]  Visit# / Early Shells: Dr. Liz Sousa: Kelly Segovia (18 visits approved until 21, Jessica Reynaga #[id number])   Medical Diagnosis: Neuritis of right ulnar nerve, Ulnar nerve compression, right    Rehab Codes: G56.21, G56.21    Onset Date: 2021                                   Next 's appt: n/a     Total visits:                                 Cancels/No Shows: 0/0    Subjective:    Pain:  []? Yes  [x]? No   Location: R elbow       Pain Rating: (0-10 scale) 0/10  Pain altered Tx:  [x]? No  []? Yes  Action:  Comments: Patient arrived noting muscle soreness from starting new job, but no complaint of pain.      Objective:  Modalities:     Precautions: Fibromyalgia    Exercises:  Exercise     R ulnar nerve decompression  Reps/ Time Weight/ Level Comments             Pulley's      flexion, scaption    Bike   5'      Standing wrist extension stretch  3x30\"       Standing wrist flexion stretch  3x30\"     Pectoral Stretch 3x30\"  At 90 and 120 degrees abd  In Doorway this date    Ulnar nerve glides     Encouraged increased shoulder horiz abd     Triceps Stretch  3x30\"   R only    3-way bicep curls  2x10 4# bilat  Neutral wrist          Tband       Rows  2x10 Purple    Shoulder Ext  2x10  Purple     Bilat ER  2x10 Purple     Triceps pull downs  2x10  Purple    Upper trap stretch       Pronation/Supination  Towel Twists              Cable Column    Using rope    Rows  2x10 60#    Extension  2x10 48#    Triceps pull downs  2x10 60#    PNF D2 flexion  2x9 48#          TRX Rows  x20           Prone Bench       Prone Retraction       Prone Rows  x20 3#    Prone Shoulder ext  x20  3#    Horiz abd  x20 3#    Scaption  x15 3# Standing abduction       Standing scaption             Counter push ups  2x11   Poor core stability    Chest press  2x20  Blue ball           Clock wall slides  x5 Green 1,3,5 o'clock direction   Other: R triceps MFR using Hawkgrips followed by active triceps contractions x10  - HELD     MFR to the R sided thoracic paraspinals including rhomboid and middle trap - HELD     Specific Instructions for next treatment: progress mobility, improve R  strength, progress scapular stabilization       Treatment Charges: Mins Units   []  Modalities     [x]  Ther Exercise 35 2   []  Manual Therapy     []  Ther Activities     []  Aquatics     []  Vasocompression     []  Other     Total Treatment time 35 2       Assessment: [x] Progressing toward goals. Progressed strength program this date. Patient notes fatigue with progressions with no associated pain. Patient notes overall improvement with symptoms during program. Notes next visit will likely be her last.     [] No change. [] Other:   [x] Patient would continue to benefit from skilled physical therapy services in order to: improve R wrist/elbow ROM, improve R  strength, and decreased pain and numbness to ease ADL's and improve quality of life        Goals  MET NOT MET ON-  GOING  Details   Date Addressed: 10/5/21           STG: To be met in 6 treatments            1. ? Pain: Decrease R elbow pain levels to <3/10 with ADLs []  []  [x]      2. ? ROM: Increase wrist/elbow ROM to equal bilat to reduce difficulty with ADLs [x]  []  []  See ROM measurements above    3. ? Strength: Increase MMT to 5/5 without increase in pain throughout to ease functional limitations and mobility  []  []  [x]  Decrease in scapular stabilization strength    4. Improve R hand  strength to at least 95% of the L hand []  []  [x]      5. Independent with Home Exercise Programs [x]  []  []        []  []  []      Date Addressed:            LTG: To be met in 8 treatments           1.  Improve score on assessment tool Upper Extremity Functional Scale from 37% impairment to less than <20% impairment  []  []  []      2. Reduce pain levels to <2/10 or less with ADLs []  []  []                        Patient goals: decrease pain      Pt. Education:  [x] Yes  [] No  [x] Reviewed Prior HEP/Ed  Method of Education: [x] Verbal  [x] Demo  [] Written  Comprehension of Education:  [x] Verbalizes understanding. [x] Demonstrates understanding. [x] Needs review. [x] Demonstrates/verbalizes HEP/Ed previously given. Plan: [x] Continue current frequency (1x/week) toward long and short term goals. []  Decrease frequency to 1x/week to prepare patient for discharge as appropriate.      [x] Specific Instructions for subsequent treatments: progress scapular strength     Time In: 1725              Time Out: 1800    Electronically signed by:  Nhan Orourke PTA

## 2021-11-12 ENCOUNTER — HOSPITAL ENCOUNTER (OUTPATIENT)
Dept: PHYSICAL THERAPY | Facility: CLINIC | Age: 31
Setting detail: THERAPIES SERIES
Discharge: HOME | End: 2021-11-12
Payer: OTHER GOVERNMENT

## 2021-11-12 NOTE — FLOWSHEET NOTE
[x] [facility]\""[facility]  Outpatient Rehabilitation &  Therapy  [facility]   [place]: [phone]  F: [phone]      Physical Therapy Cancel/No Show note    Date: 2021  Patient: Isiah Franco  : 1990  MRN: [id number]    Cancels/No Shows to date:     For today's appointment patient:    []  Cancelled    [] Rescheduled appointment    [x] No-show     Reason given by patient:    []  Patient ill    []  Conflicting appointment    [] No transportation      [] Conflict with work    [] No reason given    [] Weather related    [] INNEL-07    [x] Other:      Comments:  No call, no show       [] Next appointment was confirmed    Electronically signed by: Jordon Javier

## 2022-01-03 ENCOUNTER — HOSPITAL ENCOUNTER (OUTPATIENT)
Age: 32
Setting detail: SPECIMEN
Discharge: HOME | End: 2022-01-03

## 2022-01-03 ENCOUNTER — OFFICE VISIT (OUTPATIENT)
Dept: PRIMARY CARE CLINIC | Age: 32
End: 2022-01-03
Payer: OTHER GOVERNMENT

## 2022-01-03 VITALS — SYSTOLIC BLOOD PRESSURE: 115 MMHG | DIASTOLIC BLOOD PRESSURE: 79 MMHG | OXYGEN SATURATION: 100 % | HEART RATE: 84 BPM

## 2022-01-03 DIAGNOSIS — R68.89 FLU-LIKE SYMPTOMS: Primary | ICD-10-CM

## 2022-01-03 PROCEDURE — 99202 OFFICE O/P NEW SF 15 MIN: CPT | Performed by: INTERNAL MEDICINE

## 2022-01-03 ASSESSMENT — ENCOUNTER SYMPTOMS
FLU SYMPTOMS: 1
SORE THROAT: 1

## 2022-01-03 NOTE — PROGRESS NOTES
[facility] [address] [place] [address] IN CARE  [address] [address]  Dept: [phone]  Dept Fax: [phone]    Kacy Adhikari is a 33 y.o. female who presents to the urgent care today for her medicalconditions/complaints as noted below. Kacy Adhikari is c/o of Sinus Problem, Pharyngitis, and Concern For COVID-19      HPI:     Influenza  This is a new problem. The problem occurs constantly. The problem has been unchanged. Associated symptoms include congestion and a sore throat. Nothing aggravates the symptoms. She has tried nothing for the symptoms. The treatment provided no relief. Exposed to Cover Lockscreen. Vaccinated. Past Medical History:   Diagnosis Date    Anxiety     Attention-deficit hyperactivity disorder 6/4/2021    Chronic low back pain     Chronic low back pain     Depression     Endometriosis     Fibromyalgia 2/5/2020    Gastroesophageal reflux disease without esophagitis 4/25/2021    HPV (human papilloma virus) anogenital infection     Hyperhidrosis     PTSD (post-traumatic stress disorder)         Current Outpatient Medications   Medication Sig Dispense Refill    Bacillus Coagulans-Inulin (PROBIOTIC-PREBIOTIC PO) Take 2 tablets by mouth daily      Specialty Vitamins Products (ULTIMATE FAT BURNER PO) Take by mouth       No current facility-administered medications for this visit.      Allergies   Allergen Reactions    Codeine      Per genetic testing she has found that she over-metabolizes medication     Other Hives     Paprika     Turmeric Hives    Tylenol [Acetaminophen]      Per genetic testing she has found that she over-metabolizes medication     Vicodin [Hydrocodone-Acetaminophen]      Per genetic testing she has found that she over-metabolizes medication        Health Maintenance   Topic Date Due    Hepatitis C screen  Never done    Varicella vaccine (1 of 2 - 2-dose childhood series) Never done    Cervical cancer screen  04/16/2021    COVID-19 Vaccine (3 - Booster for Pfizer series) 04/29/2022    Depression Monitoring  06/04/2022    DTaP/Tdap/Td vaccine (3 - Td or Tdap) 03/14/2028    Flu vaccine  Completed    HIV screen  Completed    Hepatitis A vaccine  Aged Out    Hepatitis B vaccine  Aged Out    Hib vaccine  Aged Out    Meningococcal (ACWY) vaccine  Aged Out    Pneumococcal 0-64 years Vaccine  Aged Out       Subjective:      Review of Systems   HENT: Positive for congestion and sore throat. Objective:     Physical Exam  /79   Pulse 84   SpO2 100%       Assessment:       Diagnosis Orders   1. Flu-like symptoms  COVID-19       Plan:      No follow-ups on file. No orders of the defined types were placed in this encounter. Orders Placed This Encounter   Procedures    COVID-19     Standing Status:   Future     Standing Expiration Date:   1/3/2023     Scheduling Instructions:      1) Due to current limited availability of the COVID-19 test, tests will be prioritized based on responses to questions above. Testing may be delayed due to volume. 2) Print and instruct patient to adhere to CDC home isolation program. (Link Above)              3) Set up or refer patient for a monitoring program.              4) Have patient sign up for and leverage [employer] (if not previously done). Order Specific Question:   Is this test for diagnosis or screening? Answer:   Diagnosis of ill patient     Order Specific Question:   Symptomatic for COVID-19 as defined by CDC? Answer:   Yes     Order Specific Question:   Date of Symptom Onset     Answer:   1/1/2022     Order Specific Question:   Hospitalized for COVID-19? Answer:   No     Order Specific Question:   Admitted to ICU for COVID-19? Answer:   No     Order Specific Question:   Employed in healthcare setting? Answer:   No     Order Specific Question:   Resident in a congregate (group) care setting?      Answer:   No     Order Specific Question:   Pregnant? Answer:   No     Order Specific Question:   Previously tested for COVID-19? Answer:   No            Patient given educational materials - see patient instructions. Discussed use, benefit, and side effects of prescribed medications. All patientquestions answered. Pt voiced understanding.     Electronically signed by Avis Jurado MD on 1/3/2022at 10:51 AM

## 2022-01-03 NOTE — LETTER
[facility]  [address] [address]  Phone: [phone]  Fax: [phone]    Ambrose Garcia MD        January 3, 2022     Patient: Yannick Bolton   YOB: 1990   Date of Visit: 1/3/2022       To Whom It May Concern: It is my medical opinion that Yannick Bolton should remain out of work until 1-4 days pending a negative covid test.    If you have any questions or concerns, please don't hesitate to call.     Sincerely,        Ambrose Garcia MD

## 2022-01-04 DIAGNOSIS — R68.89 FLU-LIKE SYMPTOMS: ICD-10-CM

## 2022-01-06 ENCOUNTER — PATIENT MESSAGE (OUTPATIENT)
Dept: FAMILY MEDICINE CLINIC | Age: 32
End: 2022-01-06

## 2022-01-06 LAB
SARS-COV-2: ABNORMAL
SARS-COV-2: DETECTED
SOURCE: ABNORMAL

## 2022-01-06 NOTE — TELEPHONE ENCOUNTER
From: Kacy Adhikari  To: Chinedu Harmon  Sent: 1/6/2022 12:29 AM EST  Subject: Hello    Good morning Renetta Arreaga,    I was wondering if my test results have gotten to you or anyone with [address] yet? Was wondering if there was an kishore that I can download that has all the information even on my results so I can follow it (like a tracking code/number)? I'm starting to feel better, but can't go back to work until I get my test results. I did the test on Monday at [place] [address] walk in clinic. Thank you ahead of time for your time, patience,and help. If what I am asking isn't possible, that's ok.      Sincerely,   Kacy Adhikari

## 2022-01-06 NOTE — TELEPHONE ENCOUNTER
Reviewed notes and it appears patient was called earlier today by the urgent care who performed her testing.

## 2022-03-10 ENCOUNTER — TELEMEDICINE (OUTPATIENT)
Dept: FAMILY MEDICINE CLINIC | Age: 32
End: 2022-03-10
Payer: OTHER GOVERNMENT

## 2022-03-10 DIAGNOSIS — F41.9 ANXIETY AND DEPRESSION: ICD-10-CM

## 2022-03-10 DIAGNOSIS — M77.01 MEDIAL EPICONDYLITIS OF ELBOW, RIGHT: Primary | ICD-10-CM

## 2022-03-10 DIAGNOSIS — F32.A ANXIETY AND DEPRESSION: ICD-10-CM

## 2022-03-10 PROBLEM — Z97.5 USES INTRAUTERINE DEVICE FOR BIRTH CONTROL: Status: ACTIVE | Noted: 2022-03-10

## 2022-03-10 PROCEDURE — 99212 OFFICE O/P EST SF 10 MIN: CPT | Performed by: NURSE PRACTITIONER

## 2022-03-10 SDOH — ECONOMIC STABILITY: FOOD INSECURITY: WITHIN THE PAST 12 MONTHS, THE FOOD YOU BOUGHT JUST DIDN'T LAST AND YOU DIDN'T HAVE MONEY TO GET MORE.: NEVER TRUE

## 2022-03-10 SDOH — ECONOMIC STABILITY: TRANSPORTATION INSECURITY
IN THE PAST 12 MONTHS, HAS LACK OF TRANSPORTATION KEPT YOU FROM MEETINGS, WORK, OR FROM GETTING THINGS NEEDED FOR DAILY LIVING?: NO

## 2022-03-10 SDOH — ECONOMIC STABILITY: TRANSPORTATION INSECURITY
IN THE PAST 12 MONTHS, HAS THE LACK OF TRANSPORTATION KEPT YOU FROM MEDICAL APPOINTMENTS OR FROM GETTING MEDICATIONS?: NO

## 2022-03-10 SDOH — ECONOMIC STABILITY: FOOD INSECURITY: WITHIN THE PAST 12 MONTHS, YOU WORRIED THAT YOUR FOOD WOULD RUN OUT BEFORE YOU GOT MONEY TO BUY MORE.: NEVER TRUE

## 2022-03-10 ASSESSMENT — PATIENT HEALTH QUESTIONNAIRE - PHQ9
2. FEELING DOWN, DEPRESSED OR HOPELESS: 0
9. THOUGHTS THAT YOU WOULD BE BETTER OFF DEAD, OR OF HURTING YOURSELF: 0
6. FEELING BAD ABOUT YOURSELF - OR THAT YOU ARE A FAILURE OR HAVE LET YOURSELF OR YOUR FAMILY DOWN: 0
2. FEELING DOWN, DEPRESSED OR HOPELESS: 0
3. TROUBLE FALLING OR STAYING ASLEEP: 0
1. LITTLE INTEREST OR PLEASURE IN DOING THINGS: 0
SUM OF ALL RESPONSES TO PHQ QUESTIONS 1-9: 0
10. IF YOU CHECKED OFF ANY PROBLEMS, HOW DIFFICULT HAVE THESE PROBLEMS MADE IT FOR YOU TO DO YOUR WORK, TAKE CARE OF THINGS AT HOME, OR GET ALONG WITH OTHER PEOPLE: 0
5. POOR APPETITE OR OVEREATING: 0
SUM OF ALL RESPONSES TO PHQ QUESTIONS 1-9: 0
8. MOVING OR SPEAKING SO SLOWLY THAT OTHER PEOPLE COULD HAVE NOTICED. OR THE OPPOSITE, BEING SO FIGETY OR RESTLESS THAT YOU HAVE BEEN MOVING AROUND A LOT MORE THAN USUAL: 0
7. TROUBLE CONCENTRATING ON THINGS, SUCH AS READING THE NEWSPAPER OR WATCHING TELEVISION: 0
SUM OF ALL RESPONSES TO PHQ QUESTIONS 1-9: 0
SUM OF ALL RESPONSES TO PHQ9 QUESTIONS 1 & 2: 0
4. FEELING TIRED OR HAVING LITTLE ENERGY: 0
SUM OF ALL RESPONSES TO PHQ QUESTIONS 1-9: 0
1. LITTLE INTEREST OR PLEASURE IN DOING THINGS: 0
SUM OF ALL RESPONSES TO PHQ QUESTIONS 1-9: 0
SUM OF ALL RESPONSES TO PHQ9 QUESTIONS 1 & 2: 0
SUM OF ALL RESPONSES TO PHQ QUESTIONS 1-9: 0

## 2022-03-10 ASSESSMENT — ENCOUNTER SYMPTOMS: RESPIRATORY NEGATIVE: 1

## 2022-03-10 ASSESSMENT — SOCIAL DETERMINANTS OF HEALTH (SDOH): HOW HARD IS IT FOR YOU TO PAY FOR THE VERY BASICS LIKE FOOD, HOUSING, MEDICAL CARE, AND HEATING?: NOT HARD AT ALL

## 2022-03-10 NOTE — ASSESSMENT & PLAN NOTE
Uncontrolled, changes made today: Will place referral back to physical therapy. Continue home exercises, over-the-counter anti-inflammatories as needed, resting and utilizing ice as needed. Monitor for activity modifications that could be made at work to aid in reduction of symptoms.   Continue wearing copper fit compression sleeve as needed

## 2022-03-10 NOTE — LETTER
[address]  [facility] [address] [place] [address]  Phone: [phone]  Fax: [phone]    ALEX Servin CNP        March 10, 2022     Patient: Kaila Cornejo   YOB: 1990   Date of Visit: 3/10/2022       To Whom It May Concern: It is my medical opinion that Kaila Cornejo may return to full duty immediately with no restrictions. Please excuse her absence during our appointment today. If you have any questions or concerns, please don't hesitate to call.     Sincerely,        ALEX Servin CNP

## 2022-03-10 NOTE — ASSESSMENT & PLAN NOTE
Chronic. Stable. Encouraged to continued counseling, self-care and to notify office if symptoms worsen or fail to improve.

## 2022-03-10 NOTE — PROGRESS NOTES
[address] [facility] [address]  Dept: [phone]    3/10/2022    TELEHEALTH EVALUATION -- Audio/Visual (During [facility]-29 public health emergency)  Franny Bolden (:  1990) has requested an audio/video evaluation for the following concern(s):    HPI:  Appointment to discuss her radial nerve pain discomfort that has reoccurred recently. She is now working at [employer]. Radial nerve pain- she is having a recurrence of her medial epicondylitis. Recently she is also noted having numbness and tingling in her hands that started in the last 1 to 1-1/2 weeks. She has previously gone to physical therapy which was incredibly helpful. Since the recurrence of her discomfort she has been wearing a copper fit compression sleeve. A counterforce brace has previously been helpful, but is not the most comfortable device to wear while working. Anxiety and depression-well-controlled without medication at this time. Continue seeing counselor regularly. Overall she reports that she feels really good     Patient-Reported Vitals 3/10/2022   Patient-Reported Weight 138 lbs   Patient-Reported Height 5 feet 2 inches   Patient-Reported Systolic -   Patient-Reported Diastolic -   Patient-Reported Pulse -   Patient-Reported Temperature -   Patient-Reported SpO2 -   Patient-Reported Peak Flow -        There were no vitals filed for this visit. Wt Readings from Last 3 Encounters:   21 156 lb 12.8 oz (71.1 kg)   21 153 lb (69.4 kg)   20 158 lb (71.7 kg)     BP Readings from Last 3 Encounters:   22 115/79   21 106/83   21 118/76       REVIEW OF SYSTEMS:   Review of Systems   Constitutional: Negative. Negative for chills, fatigue and fever. Respiratory: Negative. Musculoskeletal: Positive for arthralgias. Neurological: Positive for numbness.    Psychiatric/Behavioral: Negative for dysphoric mood ( See HPI). The patient is not nervous/anxious ( See HPI).         PAST MEDICAL HISTORY:    Past Medical History:   Diagnosis Date    Anxiety     Attention-deficit hyperactivity disorder 2021    Chronic low back pain     Chronic low back pain     Depression     Endometriosis     Fibromyalgia 2020    Gastroesophageal reflux disease without esophagitis 2021    HPV (human papilloma virus) anogenital infection     Hyperhidrosis     PTSD (post-traumatic stress disorder)        FAMILY HISTORY:    Family History   Problem Relation Age of Onset    Depression Mother     Anxiety Disorder Mother     Depression Father     Ovarian Cancer Maternal Aunt         great aunt     ADHD Brother     Other Brother         autism     Osteoarthritis Maternal Grandmother     Cancer Maternal Grandfather         skin    COPD Maternal Grandfather     Lung Cancer Maternal Grandfather     ADHD Brother     Depression Brother     No Known Problems Paternal Grandmother     Colon Cancer Paternal Grandfather         COD    Diabetes type 2  Maternal Aunt     ADHD Son     Other Son         ODD        SOCIAL HISTORY:    Social History     Socioeconomic History    Marital status:      Spouse name: Not on file    Number of children: 1    Years of education: Not on file    Highest education level: Not on file   Occupational History    Occupation: stay at home mom    Tobacco Use    Smoking status: Former Smoker     Packs/day: 0.50     Years: 17.00     Pack years: 8.50     Types: Cigarettes     Quit date:      Years since quittin.1    Smokeless tobacco: Former User     Quit date: 2020   Vaping Use    Vaping Use: Former    Quit date: 3/25/2020    Substances: Always   Substance and Sexual Activity    Alcohol use: Yes     Comment: social     Drug use: No    Sexual activity: Yes     Partners: Male     Birth control/protection: Patch   Other Topics Concern    Not on file Social History Narrative    Not on file     Social Determinants of Health     Financial Resource Strain: Low Risk     Difficulty of Paying Living Expenses: Not hard at all   Food Insecurity: No Food Insecurity    Worried About Running Out of Food in the Last Year: Never true    [address] in the Last Year: Never true   Transportation Needs: No Transportation Needs    Lack of Transportation (Medical): No    Lack of Transportation (Non-Medical): No   Physical Activity:     Days of Exercise per Week: Not on file    Minutes of Exercise per Session: Not on file   Stress:     Feeling of Stress : Not on file   Social Connections:     Frequency of Communication with Friends and Family: Not on file    Frequency of Social Gatherings with Friends and Family: Not on file    Attends Orthodoxy Services: Not on file    Active Member of Clubs or Organizations: Not on file    Attends Club or Organization Meetings: Not on file    Marital Status: Not on file   Intimate Partner Violence:     Fear of Current or Ex-Partner: Not on file    Emotionally Abused: Not on file    Physically Abused: Not on file    Sexually Abused: Not on file   Housing Stability:     Unable to Pay for Housing in the Last Year: Not on file    Number of [place] in the Last Year: Not on file    Unstable Housing in the Last Year: Not on file       SURGICAL HISTORY:    Past Surgical History:   Procedure Laterality Date     SECTION  2008    COLPOSCOPY      , 2011 x 2     ENDOMETRIAL BIOPSY  2020    results were WNL     TONSILLECTOMY      WISDOM TOOTH EXTRACTION         CURRENT MEDICATIONS:    Current Outpatient Medications   Medication Sig Dispense Refill    Bacillus Coagulans-Inulin (PROBIOTIC-PREBIOTIC PO) Take 2 tablets by mouth daily      Specialty Vitamins Products (ULTIMATE FAT BURNER PO) Take by mouth       No current facility-administered medications for this visit.        ALLERGIES:   Allergies Encouraged to continued counseling, self-care and to notify office if symptoms worsen or fail to improve. Return if symptoms worsen or fail to improve. Tonya Brown is a 33 y.o. female being evaluated by a Virtual Visit (video visit) encounter to address concerns as mentioned above. A caregiver was present when appropriate. Due to this being a TeleHealth encounter (During [facility] public health emergency), evaluation of the following organ systems was limited: Vitals/Constitutional/EENT/Resp/CV/GI//MS/Neuro/Skin/Heme-Lymph-Imm. Pursuant to the emergency declaration under the [address] [place] [address], [address] [place] [address] authority and the [employer] Act, this Virtual Visit was conducted with patient's (and/or legal guardian's) consent, to reduce the patient's risk of exposure to COVID-19 and provide necessary medical care. The patient (and/or legal guardian) has also been advised to contact this office for worsening conditions or problems, and seek emergency medical treatment and/or call 911 if deemed necessary. Services were provided through a video synchronous discussion virtually to substitute for in-person clinic visit. Patient and provider were located at their individual homes. --ALEX Che - CNP on 3/10/2022 at 9:00 AM    (Please note that portions of this note were completed with a voice recognition program. Efforts were made to edit the dictations but occasionally words are mis-transcribed. )      An electronic signature was used to authenticate this note.

## 2022-04-19 ENCOUNTER — TELEPHONE (OUTPATIENT)
Dept: FAMILY MEDICINE CLINIC | Age: 32
End: 2022-04-19
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
## 2022-04-19 NOTE — TELEPHONE ENCOUNTER
She should be seen in person for evaluation. I would consider an urgent care or ER since we cannot see her in person this week.  We can call her with a cancellation or she would see Dr. Fernando Sees on Thursday since she has a cancellation

## 2022-04-20 ENCOUNTER — HOSPITAL ENCOUNTER (EMERGENCY)
Facility: CLINIC | Age: 32
Discharge: HOME | End: 2022-04-20
Attending: EMERGENCY MEDICINE
Payer: OTHER GOVERNMENT

## 2022-04-20 ENCOUNTER — HOSPITAL ENCOUNTER (OUTPATIENT)
Age: 32
Discharge: HOME | End: 2022-04-22

## 2022-04-20 VITALS
OXYGEN SATURATION: 99 % | RESPIRATION RATE: 15 BRPM | WEIGHT: 134 LBS | BODY MASS INDEX: 24.66 KG/M2 | SYSTOLIC BLOOD PRESSURE: 119 MMHG | DIASTOLIC BLOOD PRESSURE: 80 MMHG | HEART RATE: 70 BPM | TEMPERATURE: 98.7 F | HEIGHT: 62 IN

## 2022-04-20 DIAGNOSIS — S06.0X0A CONCUSSION WITHOUT LOSS OF CONSCIOUSNESS, INITIAL ENCOUNTER: ICD-10-CM

## 2022-04-20 DIAGNOSIS — S09.90XA INJURY OF HEAD, INITIAL ENCOUNTER: Primary | ICD-10-CM

## 2022-04-20 PROCEDURE — 6370000000 HC RX 637 (ALT 250 FOR IP): Performed by: EMERGENCY MEDICINE

## 2022-04-20 PROCEDURE — 99283 EMERGENCY DEPT VISIT LOW MDM: CPT

## 2022-04-20 RX ORDER — CYCLOBENZAPRINE HCL 10 MG
10 TABLET ORAL ONCE
Status: COMPLETED | OUTPATIENT
Start: 2022-04-20 | End: 2022-04-20

## 2022-04-20 RX ORDER — CYCLOBENZAPRINE HCL 10 MG
10 TABLET ORAL 3 TIMES DAILY PRN
Qty: 21 TABLET | Refills: 0 | Status: SHIPPED | OUTPATIENT
Start: 2022-04-20 | End: 2022-04-30

## 2022-04-20 RX ADMIN — CYCLOBENZAPRINE 10 MG: 10 TABLET, FILM COATED ORAL at 23:20

## 2022-04-20 ASSESSMENT — PAIN DESCRIPTION - DESCRIPTORS: DESCRIPTORS: ACHING

## 2022-04-20 ASSESSMENT — ENCOUNTER SYMPTOMS
SORE THROAT: 0
STRIDOR: 0
SHORTNESS OF BREATH: 0
COUGH: 0
PHOTOPHOBIA: 1
NAUSEA: 0
WHEEZING: 0
ABDOMINAL PAIN: 0
CONSTIPATION: 0
EYE PAIN: 0
DIARRHEA: 0
EYE REDNESS: 0
VOMITING: 0
COLOR CHANGE: 0
EYE DISCHARGE: 0

## 2022-04-20 ASSESSMENT — PAIN DESCRIPTION - LOCATION: LOCATION: HEAD

## 2022-04-20 ASSESSMENT — PAIN SCALES - GENERAL: PAINLEVEL_OUTOF10: 5

## 2022-04-21 ENCOUNTER — HOSPITAL ENCOUNTER (OUTPATIENT)
Dept: CT IMAGING | Age: 32
Discharge: HOME | End: 2022-04-23
Payer: OTHER GOVERNMENT

## 2022-04-21 DIAGNOSIS — S06.0X0A CONCUSSION WITHOUT LOSS OF CONSCIOUSNESS, INITIAL ENCOUNTER: ICD-10-CM

## 2022-04-21 DIAGNOSIS — S09.90XA INJURY OF HEAD, INITIAL ENCOUNTER: ICD-10-CM

## 2022-04-21 PROCEDURE — 70450 CT HEAD/BRAIN W/O DYE: CPT
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
## 2022-07-12 ENCOUNTER — PATIENT MESSAGE (OUTPATIENT)
Dept: FAMILY MEDICINE CLINIC | Age: 32
End: 2022-07-12

## 2022-07-12 ENCOUNTER — TELEMEDICINE (OUTPATIENT)
Dept: FAMILY MEDICINE CLINIC | Age: 32
End: 2022-07-12
Payer: OTHER GOVERNMENT

## 2022-07-12 DIAGNOSIS — J20.8 ACUTE VIRAL BRONCHITIS: Primary | ICD-10-CM

## 2022-07-12 DIAGNOSIS — R05.9 COUGH: ICD-10-CM

## 2022-07-12 PROCEDURE — 99212 OFFICE O/P EST SF 10 MIN: CPT | Performed by: NURSE PRACTITIONER

## 2022-07-12 RX ORDER — GUAIFENESIN AND CODEINE PHOSPHATE 100; 10 MG/5ML; MG/5ML
5 SOLUTION ORAL EVERY 4 HOURS PRN
Qty: 180 ML | Refills: 0 | Status: SHIPPED | OUTPATIENT
Start: 2022-07-12 | End: 2022-07-19

## 2022-07-12 RX ORDER — BENZONATATE 200 MG/1
200 CAPSULE ORAL 3 TIMES DAILY PRN
Qty: 30 CAPSULE | Refills: 1 | Status: SHIPPED | OUTPATIENT
Start: 2022-07-12 | End: 2022-08-14

## 2022-07-12 SDOH — ECONOMIC STABILITY: FOOD INSECURITY: WITHIN THE PAST 12 MONTHS, YOU WORRIED THAT YOUR FOOD WOULD RUN OUT BEFORE YOU GOT MONEY TO BUY MORE.: NEVER TRUE

## 2022-07-12 SDOH — ECONOMIC STABILITY: FOOD INSECURITY: WITHIN THE PAST 12 MONTHS, THE FOOD YOU BOUGHT JUST DIDN'T LAST AND YOU DIDN'T HAVE MONEY TO GET MORE.: NEVER TRUE

## 2022-07-12 ASSESSMENT — ENCOUNTER SYMPTOMS
CHEST TIGHTNESS: 0
VOMITING: 1
NAUSEA: 0
SORE THROAT: 1
COUGH: 1
SHORTNESS OF BREATH: 0

## 2022-07-12 ASSESSMENT — PATIENT HEALTH QUESTIONNAIRE - PHQ9
3. TROUBLE FALLING OR STAYING ASLEEP: 1
SUM OF ALL RESPONSES TO PHQ9 QUESTIONS 1 & 2: 2
SUM OF ALL RESPONSES TO PHQ QUESTIONS 1-9: 6
9. THOUGHTS THAT YOU WOULD BE BETTER OFF DEAD, OR OF HURTING YOURSELF: 0
SUM OF ALL RESPONSES TO PHQ QUESTIONS 1-9: 6
SUM OF ALL RESPONSES TO PHQ QUESTIONS 1-9: 2
8. MOVING OR SPEAKING SO SLOWLY THAT OTHER PEOPLE COULD HAVE NOTICED. OR THE OPPOSITE, BEING SO FIGETY OR RESTLESS THAT YOU HAVE BEEN MOVING AROUND A LOT MORE THAN USUAL: 0
5. POOR APPETITE OR OVEREATING: 1
4. FEELING TIRED OR HAVING LITTLE ENERGY: 2
2. FEELING DOWN, DEPRESSED OR HOPELESS: 1
6. FEELING BAD ABOUT YOURSELF - OR THAT YOU ARE A FAILURE OR HAVE LET YOURSELF OR YOUR FAMILY DOWN: 0
SUM OF ALL RESPONSES TO PHQ QUESTIONS 1-9: 2
7. TROUBLE CONCENTRATING ON THINGS, SUCH AS READING THE NEWSPAPER OR WATCHING TELEVISION: 0
10. IF YOU CHECKED OFF ANY PROBLEMS, HOW DIFFICULT HAVE THESE PROBLEMS MADE IT FOR YOU TO DO YOUR WORK, TAKE CARE OF THINGS AT HOME, OR GET ALONG WITH OTHER PEOPLE: 0
1. LITTLE INTEREST OR PLEASURE IN DOING THINGS: 1
SUM OF ALL RESPONSES TO PHQ QUESTIONS 1-9: 6
1. LITTLE INTEREST OR PLEASURE IN DOING THINGS: 1
2. FEELING DOWN, DEPRESSED OR HOPELESS: 1
SUM OF ALL RESPONSES TO PHQ QUESTIONS 1-9: 2
SUM OF ALL RESPONSES TO PHQ9 QUESTIONS 1 & 2: 2
SUM OF ALL RESPONSES TO PHQ QUESTIONS 1-9: 6
SUM OF ALL RESPONSES TO PHQ QUESTIONS 1-9: 2

## 2022-07-12 ASSESSMENT — SOCIAL DETERMINANTS OF HEALTH (SDOH): HOW HARD IS IT FOR YOU TO PAY FOR THE VERY BASICS LIKE FOOD, HOUSING, MEDICAL CARE, AND HEATING?: NOT HARD AT ALL

## 2022-07-12 NOTE — TELEPHONE ENCOUNTER
From: Nghia Chang  To: Ronald July  Sent: 7/12/2022 12:39 PM EDT  Subject: Nehemiasnaomie Wilder note    I have the fax number for my place of employment. Could you please fax over the doctor note? [phone]. Can you please have a letter head that says \"Attention Markos Maldonado Or Tony\"?

## 2022-07-12 NOTE — PATIENT INSTRUCTIONS
New Updates for [facility]    Thank you for choosing [facility] to give you the best care! [facility] ([facility]) is always trying to think of new ways to help their patients. We are asking all patients to try out the new digital registration that is now available through the [place] [address]. Down load today! . Via the kishore you're now able to update your personal and registration information prior to your upcoming appointment. This will save you time once you arrive at the office to check-in, not to mention your information remains safe!! Many other perks come from signing up for an account, such as:   Requesting refills   Scheduling an appointment   Completing an [address] Sending a message to the office/provider   Having access to your medication list   Paying your bill/copay prior to your appointment   Scheduling your yearly mammogram   Review your test results    If you are not familiar the [facility]ORE, please ask one of us and we will be happy to answer any questions or help you set-up your account.

## 2022-07-12 NOTE — PROGRESS NOTES
[address] [place] [address] [facility] [address]  Dept: [phone]    2022    TELEHEALTH EVALUATION -- Audio/Visual (During SVKME-75 public health emergency)  Nash Kelly (:  1990) has requested an audio/video evaluation for the following concern(s):    HPI:  Appoinment for sick rochelle..       For the last 4 days she has had a cough, congestion, chills, fever up to 99.2, vomiting one time since her. She soaked her side of the bed the night of  with sweat. She was getting light headed, hot and sweaty at work today and was sent home. She is taking Mucinex DM, Mucinex sinus max, cough drops, using Vicks vapor rub and steam baths with some of her symptoms. She has taken 2 home COVID tests were negative. Vacation to [place] to visit a friend from  until  late night. Patient-Reported Vitals 2022   Patient-Reported Weight 134 lbs   Patient-Reported Height 5 feet 2 inches   Patient-Reported Systolic -   Patient-Reported Diastolic -   Patient-Reported Pulse -   Patient-Reported Temperature -   Patient-Reported SpO2 -   Patient-Reported Peak Flow -        There were no vitals filed for this visit. Wt Readings from Last 3 Encounters:   22 134 lb (60.8 kg)   21 156 lb 12.8 oz (71.1 kg)   21 153 lb (69.4 kg)     BP Readings from Last 3 Encounters:   22 119/80   22 115/79   21 106/83       REVIEW OF SYSTEMS:   Review of Systems   Constitutional: Positive for activity change, appetite change, chills, diaphoresis, fatigue and fever. HENT: Positive for postnasal drip and sore throat. Eyes: Negative for visual disturbance. Respiratory: Positive for cough. Negative for chest tightness and shortness of breath. Chest congestion    Cardiovascular: Negative. Gastrointestinal: Positive for vomiting (X 1 with coughing ). Negative for nausea. Genitourinary: Negative. Partners: Male     Birth control/protection: Patch   Other Topics Concern    None   Social History Narrative    None     Social Determinants of Health     Financial Resource Strain: Low Risk     Difficulty of Paying Living Expenses: Not hard at all   Food Insecurity: No Food Insecurity    Worried About Running Out of Food in the Last Year: Never true    [place] of Food in the Last Year: Never true   Transportation Needs: No Transportation Needs    Lack of Transportation (Medical): No    Lack of Transportation (Non-Medical): No   Physical Activity:     Days of Exercise per Week: Not on file    Minutes of Exercise per Session: Not on file   Stress:     Feeling of Stress : Not on file   Social Connections:     Frequency of Communication with Friends and Family: Not on file    Frequency of Social Gatherings with Friends and Family: Not on file    Attends Anabaptist Services: Not on file    Active Member of Clubs or Organizations: Not on file    Attends Club or Organization Meetings: Not on file    Marital Status: Not on file   Intimate Partner Violence:     Fear of Current or Ex-Partner: Not on file    Emotionally Abused: Not on file    Physically Abused: Not on file    Sexually Abused: Not on file   Housing Stability:     Unable to Pay for Housing in the Last Year: Not on file    Number of [place] in the Last Year: Not on file    Unstable Housing in the Last Year: Not on file       SURGICAL HISTORY:    Past Surgical History:   Procedure Laterality Date     SECTION  2008   [address] [place]      , 2011 x 2     ENDOMETRIAL BIOPSY  2020    results were WNL     TONSILLECTOMY      WISDOM TOOTH EXTRACTION         CURRENT MEDICATIONS:    Current Outpatient Medications   Medication Sig Dispense Refill    guaiFENesin-codeine (CHERATUSSIN AC) 100-10 MG/5ML syrup Take 5 mLs by mouth every 4 hours as needed for Cough for up to 7 days.  180 mL 0    benzonatate (TESSALON) 200 MG capsule Take 1 capsule by mouth 3 times daily as needed for Cough 30 capsule 1    Bacillus Coagulans-Inulin (PROBIOTIC-PREBIOTIC PO) Take 2 tablets by mouth daily       No current facility-administered medications for this visit. ALLERGIES:   Allergies   Allergen Reactions    Codeine      Per genetic testing she has found that she over-metabolizes medication     Other Hives     Paprika     Turmeric Hives    Tylenol [Acetaminophen]      Per genetic testing she has found that she over-metabolizes medication     Vicodin [Hydrocodone-Acetaminophen]      Per genetic testing she has found that she over-metabolizes medication        PHYSICAL EXAM:   Physical Exam  Vitals reviewed. Constitutional:       General: She is not in acute distress. Appearance: Normal appearance. She is well-developed. She is ill-appearing. She is not toxic-appearing. HENT:      Head: Normocephalic. Right Ear: Hearing normal.      Left Ear: Hearing normal.      Mouth/Throat:      Lips: Pink. Eyes:      General: Lids are normal.      Conjunctiva/sclera: Conjunctivae normal.   Pulmonary:      Effort: Pulmonary effort is normal. No respiratory distress. Musculoskeletal:         General: Normal range of motion. Cervical back: Normal range of motion. Skin:     Findings: No rash. Neurological:      Mental Status: She is alert and oriented to person, place, and time. Mental status is at baseline. Coordination: Coordination is intact. Psychiatric:         Mood and Affect: Mood normal.         Behavior: Behavior normal. Behavior is cooperative. Thought Content: Thought content normal.         Judgment: Judgment normal.          ASSESSMENT/PLAN:  1. Acute viral bronchitis  2. Cough  -     guaiFENesin-codeine (CHERATUSSIN AC) 100-10 MG/5ML syrup; Take 5 mLs by mouth every 4 hours as needed for Cough for up to 7 days. , Disp-180 mL, R-0Normal  -     benzonatate (TESSALON) 200 MG capsule;  Take 1 capsule by mouth 3 times daily as needed for Cough, Disp-30 capsule, R-1Normal    Patient advised to take to continue to treat symptoms with cough syrup, tessalon and OTC medications. Increase fluids, rest, take time off work as ordered. If not improving or feeling worse will call in abx on Friday and extend time off work. -Discussed use, benefit, and side effects of prescribed medications. Barriers to medication compliance addressed. Return if symptoms worsen or fail to improve. Jessica Black is a 27 y.o. female being evaluated by a Virtual Visit (video visit) encounter to address concerns as mentioned above. A caregiver was present when appropriate. Due to this being a TeleHealth encounter (During EREEV-82 public health emergency), evaluation of the following organ systems was limited: Vitals/Constitutional/EENT/Resp/CV/GI//MS/Neuro/Skin/Heme-Lymph-Imm. Pursuant to the emergency declaration under the [address] [place] [address] and the [employer] and [employer] Act, this Virtual Visit was conducted with patient's (and/or legal guardian's) consent, to reduce the patient's risk of exposure to COVID-19 and provide necessary medical care. The patient (and/or legal guardian) has also been advised to contact this office for worsening conditions or problems, and seek emergency medical treatment and/or call 911 if deemed necessary. Services were provided through a video synchronous discussion virtually to substitute for in-person clinic visit. Patient and provider were located at their individual homes. --ALEX Flores - CNP on 7/12/2022 at 12:29 PM    (Please note that portions of this note were completed with a voice recognition program. Efforts were made to edit the dictations but occasionally words are mis-transcribed. )      An electronic signature was used to authenticate this note.

## 2022-07-12 NOTE — PROGRESS NOTES
Depression screening done  Financial resource strain done  Chief Complaint   Patient presents with    Cough     x 4 days, chills, sweat, congesetion, fever, vomiting    Sinusitis     Mucinex, cough drops, vicks vapor rub and stream baths.  Not helping    Covid Testing     tested negative with home test (2 different ones)

## 2022-07-27 ENCOUNTER — OFFICE VISIT (OUTPATIENT)
Dept: FAMILY MEDICINE CLINIC | Age: 32
End: 2022-07-27
Payer: OTHER GOVERNMENT

## 2022-07-27 VITALS
BODY MASS INDEX: 25.97 KG/M2 | TEMPERATURE: 98.4 F | WEIGHT: 142 LBS | DIASTOLIC BLOOD PRESSURE: 73 MMHG | SYSTOLIC BLOOD PRESSURE: 104 MMHG | HEART RATE: 74 BPM | OXYGEN SATURATION: 97 %

## 2022-07-27 DIAGNOSIS — G44.89 OTHER HEADACHE SYNDROME: Primary | ICD-10-CM

## 2022-07-27 DIAGNOSIS — F43.9 STRESS AT HOME: ICD-10-CM

## 2022-07-27 PROCEDURE — 99213 OFFICE O/P EST LOW 20 MIN: CPT | Performed by: FAMILY MEDICINE

## 2022-07-27 RX ORDER — AMITRIPTYLINE HYDROCHLORIDE 25 MG/1
25 TABLET, FILM COATED ORAL NIGHTLY
Qty: 30 TABLET | Refills: 5 | Status: SHIPPED | OUTPATIENT
Start: 2022-07-27

## 2022-07-27 RX ORDER — RIZATRIPTAN BENZOATE 10 MG/1
10 TABLET ORAL
Qty: 30 TABLET | Refills: 3 | Status: SHIPPED | OUTPATIENT
Start: 2022-07-27 | End: 2022-10-31

## 2022-07-27 ASSESSMENT — PATIENT HEALTH QUESTIONNAIRE - PHQ9
9. THOUGHTS THAT YOU WOULD BE BETTER OFF DEAD, OR OF HURTING YOURSELF: 0
2. FEELING DOWN, DEPRESSED OR HOPELESS: 1
SUM OF ALL RESPONSES TO PHQ QUESTIONS 1-9: 4
SUM OF ALL RESPONSES TO PHQ QUESTIONS 1-9: 4
6. FEELING BAD ABOUT YOURSELF - OR THAT YOU ARE A FAILURE OR HAVE LET YOURSELF OR YOUR FAMILY DOWN: 0
10. IF YOU CHECKED OFF ANY PROBLEMS, HOW DIFFICULT HAVE THESE PROBLEMS MADE IT FOR YOU TO DO YOUR WORK, TAKE CARE OF THINGS AT HOME, OR GET ALONG WITH OTHER PEOPLE: 0
7. TROUBLE CONCENTRATING ON THINGS, SUCH AS READING THE NEWSPAPER OR WATCHING TELEVISION: 0
1. LITTLE INTEREST OR PLEASURE IN DOING THINGS: 0
4. FEELING TIRED OR HAVING LITTLE ENERGY: 1
3. TROUBLE FALLING OR STAYING ASLEEP: 1
SUM OF ALL RESPONSES TO PHQ9 QUESTIONS 1 & 2: 1
SUM OF ALL RESPONSES TO PHQ QUESTIONS 1-9: 4
8. MOVING OR SPEAKING SO SLOWLY THAT OTHER PEOPLE COULD HAVE NOTICED. OR THE OPPOSITE, BEING SO FIGETY OR RESTLESS THAT YOU HAVE BEEN MOVING AROUND A LOT MORE THAN USUAL: 0
5. POOR APPETITE OR OVEREATING: 1
SUM OF ALL RESPONSES TO PHQ QUESTIONS 1-9: 4

## 2022-07-27 NOTE — PROGRESS NOTES
General FM note    Ken Mike is a 27 y.o. female who presents today for follow up on her  medical conditions as noted below.   Ken Mike is c/o of   Chief Complaint   Patient presents with    Headache    Referral - General     PT -right elbow/arm pain       Patient Active Problem List:     Anxiety and depression     PTSD (post-traumatic stress disorder)     History of low transverse  section     Chronic low back pain     Endometriosis     Fibromyalgia     Human papilloma virus (HPV) infection     Slow transit constipation     BMI 27.0-27.9,adult     Gastroesophageal reflux disease without esophagitis     Tear film insufficiency     Abnormal Pap smear of cervix     Insomnia related to another mental disorder     Attention-deficit hyperactivity disorder     Medial epicondylitis of elbow, right     Pain of right middle finger     Uses intrauterine device for birth control     Past Medical History:   Diagnosis Date    Anxiety     Attention-deficit hyperactivity disorder 2021    Chronic low back pain     Chronic low back pain     Depression     Endometriosis     Fibromyalgia 2020    Gastroesophageal reflux disease without esophagitis 2021    HPV (human papilloma virus) anogenital infection     Hyperhidrosis     PTSD (post-traumatic stress disorder)       Past Surgical History:   Procedure Laterality Date     SECTION      COLPOSCOPY      , 2011 x 2     ENDOMETRIAL BIOPSY  2020    results were WNL     TONSILLECTOMY      WISDOM TOOTH EXTRACTION       Family History   Problem Relation Age of Onset    Depression Mother     Anxiety Disorder Mother     Depression Father     Ovarian Cancer Maternal Aunt         great aunt     ADHD Brother     Other Brother         autism     Osteoarthritis Maternal Grandmother     Cancer Maternal Grandfather         skin    COPD Maternal Grandfather     Lung Cancer Maternal Grandfather     ADHD Brother     Depression Brother     No Known Problems Paternal Grandmother     Colon Cancer Paternal Grandfather         COD    Diabetes type 2  Maternal Aunt     ADHD Son     Other Son         ODD      Current Outpatient Medications   Medication Sig Dispense Refill    amitriptyline (ELAVIL) 25 MG tablet Take 1 tablet by mouth nightly 30 tablet 5    rizatriptan (MAXALT) 10 MG tablet Take 1 tablet by mouth once as needed for Migraine May repeat in 2 hours if needed 30 tablet 3    benzonatate (TESSALON) 200 MG capsule Take 1 capsule by mouth 3 times daily as needed for Cough (Patient not taking: Reported on 2022) 30 capsule 1    Bacillus Coagulans-Inulin (PROBIOTIC-PREBIOTIC PO) Take 2 tablets by mouth daily (Patient not taking: Reported on 2022)       No current facility-administered medications for this visit. ALLERGIES:    Allergies   Allergen Reactions    Codeine      Per genetic testing she has found that she over-metabolizes medication     Other Hives     Paprika     Turmeric Hives    Tylenol [Acetaminophen]      Per genetic testing she has found that she over-metabolizes medication     Vicodin [Hydrocodone-Acetaminophen]      Per genetic testing she has found that she over-metabolizes medication        Social History     Tobacco Use    Smoking status: Former     Packs/day: 0.50     Years: 17.00     Pack years: 8.50     Types: Cigarettes     Quit date: 2018     Years since quittin.5    Smokeless tobacco: Current     Last attempt to quit: 2020   Substance Use Topics    Alcohol use: Yes     Comment: social       Body mass index is 25.97 kg/m². /73   Pulse 74   Temp 98.4 °F (36.9 °C)   Wt 142 lb (64.4 kg)   SpO2 97%   BMI 25.97 kg/m²     Subjective:      HPI    27 y.o. female coming in today for an acute visit because of continuous ongoing headaches. She tells me that her 14-year-old son was diagnosed with ADD and combat disorder has been leaving the house at night.   The patient states even they put locks on the door they are trying to make the house safe but he just leaves the house he lets the dogs out and then he is gone for hours and hours. The patient states that when he is gone she is not able to sleep she has to look for him. In the past police was involved and no other authority. She states that her son has been lying and really not working with her. She tells me that [facility] offices but she states there is no help there. She really does not know what to do with her son. She tells me that she had noticed migraine for weeks. Comes on and off. And when getting home from her work she usually goes in her room with quiet and dark. She would like to have some medication. She states that she does not have a lot of nausea and vomiting. Review of Systems   Constitutional: Negative for fever and unexpected weight change. Pertinent items are noted in HPI. Objective:   Physical Exam  Constitutional: VS (see above). General appearance: normal development, habitus and attention, no deformities. No distress. Eyes: normal conjunctiva and lids. CAV: RRR, no RMG. No edema lower extremities. Pulmo: CTA bilateral, no CWR. Skin: no rashes, lesions or ulcers. Musculoskeletal: normal gait. Nails: no clubbing or cyanosis. Psychiatric: alert and oriented to place, time and person. Normal mood and affect. Assessment:       Diagnosis Orders   1. Other headache syndrome  amitriptyline (ELAVIL) 25 MG tablet    rizatriptan (MAXALT) 10 MG tablet      2. Stress at home            Plan: We will start the patient on the maintenance medication elavil low dosage. Also maxalt. I told her to follow-up with her primary care physician to make sure these dosages are working for her. The Elavil could be improved quite a bit. Hopefully this will help her sleep as well. Return if symptoms worsen or fail to improve.   No orders of the defined types were placed in this encounter. Orders Placed This Encounter   Medications    amitriptyline (ELAVIL) 25 MG tablet     Sig: Take 1 tablet by mouth nightly     Dispense:  30 tablet     Refill:  5    rizatriptan (MAXALT) 10 MG tablet     Sig: Take 1 tablet by mouth once as needed for Migraine May repeat in 2 hours if needed     Dispense:  30 tablet     Refill:  3       Call or return to clinic prn if these symptoms worsen or fail to improve as anticipated. I have reviewed the instructions with the patient, answering all questions to patient's satisfaction. Alem received counseling on the following healthy behaviors: nutrition, exercise, and medication adherence  Reviewed prior labs and health maintenance. Continue current medications, diet and exercise. Discussed use, benefit, and side effects of prescribed medications. Barriers to medication compliance addressed. Patient given educational materials - see patient instructions. All patient questions answered. Patient voiced understanding.       Electronically signed by Helen Bowden MD on 7/28/2022 at 6:21 AM       (Please note that portions of this note were completed with a voice recognition program. Efforts were made to edit the dictations but occasionally words are mis-transcribed.)

## 2022-08-04 ENCOUNTER — TELEPHONE (OUTPATIENT)
Dept: FAMILY MEDICINE CLINIC | Age: 32
End: 2022-08-04

## 2022-08-04 DIAGNOSIS — M77.01 MEDIAL EPICONDYLITIS OF ELBOW, RIGHT: Primary | ICD-10-CM

## 2022-08-04 NOTE — TELEPHONE ENCOUNTER
Patient called stated that she is having numbness, tingling, stiffness and pain in her right wrist that is waking her up in the middle of the night. States that her referral for physical therapy was denied due to the fact that her insurance company does not cover orders from a NP needs to have a MD. Would like another referral for physical therapy sent to [facility]. It's with [facility]. Address is [facility]. [address] [place] [address], [address] [place] [address]. Please advise.  Thank you

## 2022-08-14 ENCOUNTER — HOSPITAL ENCOUNTER (EMERGENCY)
Age: 32
Discharge: HOME | End: 2022-08-14
Attending: EMERGENCY MEDICINE
Payer: OTHER GOVERNMENT

## 2022-08-14 VITALS
SYSTOLIC BLOOD PRESSURE: 130 MMHG | HEART RATE: 103 BPM | OXYGEN SATURATION: 100 % | TEMPERATURE: 97.8 F | WEIGHT: 132 LBS | HEIGHT: 62 IN | RESPIRATION RATE: 16 BRPM | DIASTOLIC BLOOD PRESSURE: 83 MMHG | BODY MASS INDEX: 24.29 KG/M2

## 2022-08-14 DIAGNOSIS — M79.601 RIGHT ARM PAIN: Primary | ICD-10-CM

## 2022-08-14 DIAGNOSIS — G56.21 ULNAR NERVE ENTRAPMENT AT ELBOW, RIGHT: ICD-10-CM

## 2022-08-14 PROCEDURE — 6370000000 HC RX 637 (ALT 250 FOR IP): Performed by: EMERGENCY MEDICINE

## 2022-08-14 PROCEDURE — 96372 THER/PROPH/DIAG INJ SC/IM: CPT

## 2022-08-14 PROCEDURE — 99284 EMERGENCY DEPT VISIT MOD MDM: CPT

## 2022-08-14 PROCEDURE — 6360000002 HC RX W HCPCS: Performed by: EMERGENCY MEDICINE

## 2022-08-14 RX ORDER — KETOROLAC TROMETHAMINE 30 MG/ML
30 INJECTION, SOLUTION INTRAMUSCULAR; INTRAVENOUS ONCE
Status: COMPLETED | OUTPATIENT
Start: 2022-08-14 | End: 2022-08-14

## 2022-08-14 RX ORDER — TRAMADOL HYDROCHLORIDE 50 MG/1
50 TABLET ORAL EVERY 6 HOURS PRN
Qty: 12 TABLET | Refills: 0 | Status: SHIPPED | OUTPATIENT
Start: 2022-08-14 | End: 2022-08-17

## 2022-08-14 RX ORDER — PREDNISONE 10 MG/1
TABLET ORAL
Qty: 20 TABLET | Refills: 0 | Status: SHIPPED | OUTPATIENT
Start: 2022-08-14 | End: 2022-08-24

## 2022-08-14 RX ORDER — ACETAMINOPHEN 500 MG
1000 TABLET ORAL EVERY 8 HOURS PRN
Qty: 30 TABLET | Refills: 0 | Status: SHIPPED | OUTPATIENT
Start: 2022-08-14

## 2022-08-14 RX ORDER — NAPROXEN 500 MG/1
500 TABLET ORAL 2 TIMES DAILY PRN
Qty: 30 TABLET | Refills: 0 | Status: SHIPPED | OUTPATIENT
Start: 2022-08-14

## 2022-08-14 RX ORDER — METHOCARBAMOL 500 MG/1
500 TABLET, FILM COATED ORAL 3 TIMES DAILY PRN
Qty: 15 TABLET | Refills: 0 | Status: SHIPPED | OUTPATIENT
Start: 2022-08-14 | End: 2022-08-19

## 2022-08-14 RX ORDER — PREDNISONE 20 MG/1
60 TABLET ORAL ONCE
Status: COMPLETED | OUTPATIENT
Start: 2022-08-14 | End: 2022-08-14

## 2022-08-14 RX ORDER — ACETAMINOPHEN 500 MG
1000 TABLET ORAL ONCE
Status: COMPLETED | OUTPATIENT
Start: 2022-08-14 | End: 2022-08-14

## 2022-08-14 RX ORDER — ORPHENADRINE CITRATE 30 MG/ML
60 INJECTION INTRAMUSCULAR; INTRAVENOUS ONCE
Status: COMPLETED | OUTPATIENT
Start: 2022-08-14 | End: 2022-08-14

## 2022-08-14 RX ORDER — TRAMADOL HYDROCHLORIDE 50 MG/1
50 TABLET ORAL ONCE
Status: COMPLETED | OUTPATIENT
Start: 2022-08-14 | End: 2022-08-14

## 2022-08-14 RX ADMIN — TRAMADOL HYDROCHLORIDE 50 MG: 50 TABLET ORAL at 08:14

## 2022-08-14 RX ADMIN — ORPHENADRINE CITRATE 60 MG: 30 INJECTION INTRAMUSCULAR; INTRAVENOUS at 08:13

## 2022-08-14 RX ADMIN — KETOROLAC TROMETHAMINE 30 MG: 30 INJECTION, SOLUTION INTRAMUSCULAR at 08:13

## 2022-08-14 RX ADMIN — PREDNISONE 60 MG: 20 TABLET ORAL at 08:13

## 2022-08-14 RX ADMIN — ACETAMINOPHEN 1000 MG: 500 TABLET ORAL at 08:14

## 2022-08-14 ASSESSMENT — PAIN SCALES - GENERAL: PAINLEVEL_OUTOF10: 8

## 2022-08-14 ASSESSMENT — PAIN - FUNCTIONAL ASSESSMENT: PAIN_FUNCTIONAL_ASSESSMENT: 0-10

## 2022-08-14 ASSESSMENT — ENCOUNTER SYMPTOMS: COLOR CHANGE: 0

## 2022-08-14 NOTE — Clinical Note
Gisselle Britt was seen and treated in our emergency department on 8/14/2022. She may return to work on 08/16/2022. Limited use of right arm. Light duty, no heavy lifting over 15 pounds. Needs to be seen by specialist      If you have any questions or concerns, please don't hesitate to call.       Zoraida Aguilar, DO

## 2022-08-14 NOTE — ED PROVIDER NOTES
[address] [facility]  Emergency Department Encounter     Pt Name: Ghassan Gordon  MRN: [id number]  [place] 1990  Date of evaluation: 22  PCP:  ALEX Delgado Mai [id number]       Chief Complaint   Patient presents with    Arm Pain     R side. HISTORY OF PRESENT ILLNESS  (Location/Symptom, Timing/Onset, Context/Setting, Quality, Duration, Modifying Factors, Severity.)    Ghassan Gordon is a 27 y.o. female who has a past medical history of right wrist carpal tunnel syndrome was previously in physical therapy which was helping presents emergency department with right elbow pain with radiation down into her hand and fingertips with paresthesias. Patient states that she been taking Motrin at home and trying to do her exercises from physical therapy that she can remember, however states that this is no longer working. She has been turning it back into physical therapy since March, however there is been issues with insurance. She has not had any falls or injuries to the arm. No overlying skin changes. No neck or shoulder pain. PAST MEDICAL / SURGICAL / SOCIAL / FAMILY HISTORY    has a past medical history of Anxiety, Attention-deficit hyperactivity disorder, Chronic low back pain, Chronic low back pain, Depression, Endometriosis, Fibromyalgia, Gastroesophageal reflux disease without esophagitis, HPV (human papilloma virus) anogenital infection, Hyperhidrosis, and PTSD (post-traumatic stress disorder). has a past surgical history that includes Tonsillectomy; Wilmer tooth extraction;  section (); Colposcopy; and Endometrial biopsy (2020).     Social History     Socioeconomic History    Marital status:      Spouse name: Not on file    Number of children: 1    Years of education: Not on file    Highest education level: Not on file   Occupational History    Occupation: stay at home mom    Tobacco Use    Smoking status: Former Packs/day: 0.50     Years: 17.00     Pack years: 8.50     Types: Cigarettes     Quit date:      Years since quittin.6    Smokeless tobacco: Current     Last attempt to quit: 2020   Vaping Use    Vaping Use: Every day    Last attempt to quit: 3/25/2020   Substance and Sexual Activity    Alcohol use: Yes     Comment: social     Drug use: No    Sexual activity: Yes     Partners: Male     Birth control/protection: Patch   Other Topics Concern    Not on file   Social History Narrative    Not on file     Social Determinants of Health     Financial Resource Strain: Low Risk     Difficulty of Paying Living Expenses: Not hard at all   Food Insecurity: No Food Insecurity    Worried About Running Out of Food in the Last Year: Never true    [address] in the Last Year: Never true   Transportation Needs: No Transportation Needs    Lack of Transportation (Medical): No    Lack of Transportation (Non-Medical): No   Physical Activity: Not on file   Stress: Not on file   Social Connections: Not on file   Intimate Partner Violence: Not on file   Housing Stability: Not on file       Family History   Problem Relation Age of Onset    Depression Mother     Anxiety Disorder Mother     Depression Father     Ovarian Cancer Maternal Aunt         great aunt     ADHD Brother     Other Brother         autism     Osteoarthritis Maternal Grandmother     Cancer Maternal Grandfather         skin    COPD Maternal Grandfather     Lung Cancer Maternal Grandfather     ADHD Brother     Depression Brother     No Known Problems Paternal Grandmother     Colon Cancer Paternal Grandfather         COD    Diabetes type 2  Maternal Aunt     ADHD Son     Other Son         ODD        Allergies:    Codeine, Other, Turmeric, Tylenol [acetaminophen], and Vicodin [hydrocodone-acetaminophen]    Home Medications:  Prior to Admission medications    Medication Sig Start Date End Date Taking?  Authorizing Provider   acetaminophen (TYLENOL) 500 MG tablet Take 2 tablets by mouth every 8 hours as needed for Pain 8/14/22  Yes Kaitlin Drew DO   methocarbamol (ROBAXIN) 500 MG tablet Take 1 tablet by mouth 3 times daily as needed (pain/muscle spasm) 8/14/22 8/19/22 Yes Kaitlin Drew DO   naproxen (NAPROSYN) 500 MG tablet Take 1 tablet by mouth 2 times daily as needed for Pain 8/14/22  Yes Kaitlin Drew DO   traMADol (ULTRAM) 50 MG tablet Take 1 tablet by mouth every 6 hours as needed for Pain for up to 3 days. Intended supply: 3 days. Take lowest dose possible to manage pain 8/14/22 8/17/22 Yes Kaitlin Drew DO   predniSONE (DELTASONE) 10 MG tablet Take 4 tablets by mouth once daily for 5 days 8/14/22 8/24/22 Yes Kaitlin Drew DO   Elastic Bandages & Supports (WRIST SPLINT/COCK-UP/RIGHT SM) MISC 1 each by Does not apply route nightly as needed (wrist pain) 8/14/22  Yes Kaitlin Drew DO   amitriptyline (ELAVIL) 25 MG tablet Take 1 tablet by mouth nightly 7/27/22   Helen Bowden MD   rizatriptan (MAXALT) 10 MG tablet Take 1 tablet by mouth once as needed for Migraine May repeat in 2 hours if needed 7/27/22 7/27/22  Helen Bowden MD   Bacillus Coagulans-Inulin (PROBIOTIC-PREBIOTIC PO) Take 2 tablets by mouth daily  Patient not taking: Reported on 7/27/2022    Historical Provider, MD       REVIEW OF SYSTEMS    (2-9 systems for level 4, 10 or more for level 5)    Review of Systems   Musculoskeletal:  Positive for arthralgias and myalgias. Skin:  Negative for color change. Neurological:  Positive for numbness. Negative for weakness. PHYSICAL EXAM   (up to 7 for level 4, 8 or more for level 5)    VITALS:   Vitals:    08/14/22 0737   BP: 130/83   Pulse: (!) 103   Temp: 97.8 °F (36.6 °C)   SpO2: 100%   Weight: 132 lb (59.9 kg)   Height: 5' 2\" (1.575 m)       Physical Exam  Vitals and nursing note reviewed. Constitutional:       General: She is not in acute distress. Appearance: She is well-developed. She is not diaphoretic.    HENT: Head: Normocephalic and atraumatic. Eyes:      Conjunctiva/sclera: Conjunctivae normal.   Cardiovascular:      Rate and Rhythm: Normal rate and regular rhythm. Pulses: Normal pulses. Pulmonary:      Effort: Pulmonary effort is normal. No respiratory distress. Musculoskeletal:         General: No swelling or tenderness. Normal range of motion. Cervical back: Normal range of motion. Comments: Pain with range of motion of right elbow. Subjective paresthesias from the elbow distally down into the hand. 2+ radial pulse. Cap refill less than 2 seconds. Intact  strength. Skin:     General: Skin is warm and dry. Capillary Refill: Capillary refill takes less than 2 seconds. Findings: No bruising, erythema or rash. Neurological:      General: No focal deficit present. Mental Status: She is alert. Sensory: Sensory deficit (subjective paresthesias from R elbow distally) present. Motor: Motor function is intact. Psychiatric:         Behavior: Behavior normal.       DIFFERENTIAL  DIAGNOSIS   PLAN (LABS / IMAGING / EKG):  No orders of the defined types were placed in this encounter.       MEDICATIONS ORDERED:  Orders Placed This Encounter   Medications    ketorolac (TORADOL) injection 30 mg    predniSONE (DELTASONE) tablet 60 mg    traMADol (ULTRAM) tablet 50 mg    acetaminophen (TYLENOL) tablet 1,000 mg    orphenadrine (NORFLEX) injection 60 mg    acetaminophen (TYLENOL) 500 MG tablet     Sig: Take 2 tablets by mouth every 8 hours as needed for Pain     Dispense:  30 tablet     Refill:  0    methocarbamol (ROBAXIN) 500 MG tablet     Sig: Take 1 tablet by mouth 3 times daily as needed (pain/muscle spasm)     Dispense:  15 tablet     Refill:  0    naproxen (NAPROSYN) 500 MG tablet     Sig: Take 1 tablet by mouth 2 times daily as needed for Pain     Dispense:  30 tablet     Refill:  0    traMADol (ULTRAM) 50 MG tablet     Sig: Take 1 tablet by mouth every 6 hours as needed for Pain for up to 3 days. Intended supply: 3 days. Take lowest dose possible to manage pain     Dispense:  12 tablet     Refill:  0    predniSONE (DELTASONE) 10 MG tablet     Sig: Take 4 tablets by mouth once daily for 5 days     Dispense:  20 tablet     Refill:  0    Elastic Bandages & Supports (WRIST SPLINT/COCK-UP/RIGHT SM) MISC     Si each by Does not apply route nightly as needed (wrist pain)     Dispense:  1 each     Refill:  0       DIAGNOSTIC RESULTS / EMERGENCYDEPARTMENT COURSE / MDM   LABS:  Labs Reviewed - No data to display    RADIOLOGY:  No results found. EMERGENCY DEPARTMENT COURSE:       MDM     Amount and/or Complexity of Data Reviewed  Review and summarize past medical records: yes    Patient Progress  Patient progress: stable      PROCEDURES:  Procedures     CONSULTS:  None    CRITICAL CARE:  NONE    FINAL IMPRESSION     1. Right arm pain    2. Ulnar nerve entrapment at elbow, right        DISPOSITION / PLAN   DISPOSITION Decision To Discharge 2022 08:00:24 AM      Evaluation and treatment course in the ED, and plan of care upon discharge was discussed in length with the patient. Patient had no further questions prior to being discharged and was instructed to return to the ED for new or worsening symptoms. Any changes to existing medications or new prescriptions were reviewed with patient and they expressed understanding of how to correctly take their medications and the possible side effects. PATIENT REFERRED TO:  ALEX Jalloh - [facility]  [address]  [facility] [phone]  [address] ED  [address] [facility]  [phone]    As needed, If symptoms worsen    Victoria Gallegos MD  [phone] N.  [address] [facility]    Schedule an appointment as soon as possible for a visit   As needed for orthopedic surgery follow up    Mckayla Del Castillo, [address] [facility] [address]

## 2022-08-17 ENCOUNTER — HOSPITAL ENCOUNTER (EMERGENCY)
Age: 32
Discharge: HOME | End: 2022-08-18
Attending: EMERGENCY MEDICINE
Payer: OTHER GOVERNMENT

## 2022-08-17 VITALS
SYSTOLIC BLOOD PRESSURE: 131 MMHG | DIASTOLIC BLOOD PRESSURE: 86 MMHG | TEMPERATURE: 97.9 F | WEIGHT: 142 LBS | OXYGEN SATURATION: 97 % | RESPIRATION RATE: 18 BRPM | BODY MASS INDEX: 26.13 KG/M2 | HEART RATE: 85 BPM | HEIGHT: 62 IN

## 2022-08-17 DIAGNOSIS — G56.21 ENTRAPMENT OF RIGHT ULNAR NERVE: Primary | ICD-10-CM

## 2022-08-17 PROCEDURE — 6370000000 HC RX 637 (ALT 250 FOR IP): Performed by: EMERGENCY MEDICINE

## 2022-08-17 PROCEDURE — 96372 THER/PROPH/DIAG INJ SC/IM: CPT

## 2022-08-17 PROCEDURE — 99284 EMERGENCY DEPT VISIT MOD MDM: CPT

## 2022-08-17 PROCEDURE — 6360000002 HC RX W HCPCS: Performed by: EMERGENCY MEDICINE

## 2022-08-17 RX ORDER — ORPHENADRINE CITRATE 30 MG/ML
60 INJECTION INTRAMUSCULAR; INTRAVENOUS ONCE
Status: COMPLETED | OUTPATIENT
Start: 2022-08-17 | End: 2022-08-17

## 2022-08-17 RX ORDER — OXYCODONE HYDROCHLORIDE AND ACETAMINOPHEN 5; 325 MG/1; MG/1
1 TABLET ORAL EVERY 6 HOURS PRN
Qty: 20 TABLET | Refills: 0 | Status: SHIPPED | OUTPATIENT
Start: 2022-08-17 | End: 2022-08-22

## 2022-08-17 RX ORDER — MORPHINE SULFATE 4 MG/ML
4 INJECTION, SOLUTION INTRAMUSCULAR; INTRAVENOUS ONCE
Status: COMPLETED | OUTPATIENT
Start: 2022-08-17 | End: 2022-08-17

## 2022-08-17 RX ORDER — DIAZEPAM 5 MG/1
5 TABLET ORAL ONCE
Status: COMPLETED | OUTPATIENT
Start: 2022-08-17 | End: 2022-08-17

## 2022-08-17 RX ORDER — GABAPENTIN 100 MG/1
100 CAPSULE ORAL 3 TIMES DAILY
Qty: 90 CAPSULE | Refills: 0 | Status: SHIPPED | OUTPATIENT
Start: 2022-08-17 | End: 2022-10-31

## 2022-08-17 RX ORDER — GABAPENTIN 100 MG/1
100 CAPSULE ORAL ONCE
Status: COMPLETED | OUTPATIENT
Start: 2022-08-17 | End: 2022-08-17

## 2022-08-17 RX ADMIN — ORPHENADRINE CITRATE 60 MG: 30 INJECTION INTRAMUSCULAR; INTRAVENOUS at 23:40

## 2022-08-17 RX ADMIN — MORPHINE SULFATE 4 MG: 4 INJECTION, SOLUTION INTRAMUSCULAR; INTRAVENOUS at 23:40

## 2022-08-17 RX ADMIN — GABAPENTIN 100 MG: 100 CAPSULE ORAL at 23:40

## 2022-08-17 RX ADMIN — DIAZEPAM 5 MG: 5 TABLET ORAL at 23:40

## 2022-08-17 ASSESSMENT — ENCOUNTER SYMPTOMS: COLOR CHANGE: 0

## 2022-08-17 ASSESSMENT — PAIN SCALES - GENERAL: PAINLEVEL_OUTOF10: 10

## 2022-08-17 ASSESSMENT — PAIN - FUNCTIONAL ASSESSMENT: PAIN_FUNCTIONAL_ASSESSMENT: 0-10

## 2022-08-17 NOTE — Clinical Note
Karma Meek was seen and treated in our emergency department on 8/17/2022. She may return to work on 08/22/2022. If you have any questions or concerns, please don't hesitate to call.       Tremaine Dalton, DO Structured M[facility]

## 2022-08-18 NOTE — ED PROVIDER NOTES
[address] [facility]  Emergency Department Encounter     Pt Name: Yeison Hyde  MRN: [id number]  [place] 1990  Date of evaluation: 22  PCP:  Marjorie Hernandez MD    [address] [place] [address]       Chief Complaint   Patient presents with    Arm Pain     R arm nerve pain. Hx of ulnar nerve entrapment. Was seen here on  and has been taking prescribes medications but are not helping. R hand locked into place yesterday for a little while. HISTORY OF PRESENT ILLNESS  (Location/Symptom, Timing/Onset, Context/Setting, Quality, Duration, Modifying Factors, Severity.)    Yeison Hyde is a 27 y.o. female who presents with acute on chronic right elbow pain secondary to ulnar nerve entrapment. Patient was recently seen in the emergency department for similar complaints and given multiple prescriptions. States that has been taking these like she is supposed to and states that the pain got so bad this evening that she can no longer take it and the medications were not working. Last took doses around 6 PM.  She did call both the orthopedic surgeon as well as a neurosurgeon but they cannot get her in until October. No falls or repeat injury to the area. Is having numbness and tingling and muscle spasms in with her hand locking. PAST MEDICAL / SURGICAL / SOCIAL / FAMILY HISTORY    has a past medical history of Anxiety, Attention-deficit hyperactivity disorder, Chronic low back pain, Chronic low back pain, Depression, Endometriosis, Fibromyalgia, Gastroesophageal reflux disease without esophagitis, HPV (human papilloma virus) anogenital infection, Hyperhidrosis, and PTSD (post-traumatic stress disorder). has a past surgical history that includes Tonsillectomy; Glennie tooth extraction;  section (); Colposcopy; and Endometrial biopsy (2020).     Social History     Socioeconomic History    Marital status:      Spouse name: Not on file    Number of children: 1    Years of education: Not on file    Highest education level: Not on file   Occupational History    Occupation: stay at home mom    Tobacco Use    Smoking status: Former     Packs/day: 0.50     Years: 17.00     Pack years: 8.50     Types: Cigarettes     Quit date:      Years since quittin.6    Smokeless tobacco: Former     Quit date: 2020   Vaping Use    Vaping Use: Former    Quit date: 3/25/2020   Substance and Sexual Activity    Alcohol use: Yes     Comment: social     Drug use: No    Sexual activity: Yes     Partners: Male     Birth control/protection: Patch   Other Topics Concern    Not on file   Social History Narrative    Not on file     Social Determinants of Health     Financial Resource Strain: Low Risk     Difficulty of Paying Living Expenses: Not hard at all   Food Insecurity: No Food Insecurity    Worried About [address] in the Last Year: Never true    [address] in the Last Year: Never true   Transportation Needs: No Transportation Needs    Lack of Transportation (Medical): No    Lack of Transportation (Non-Medical):  No   Physical Activity: Not on file   Stress: Not on file   Social Connections: Not on file   Intimate Partner Violence: Not on file   Housing Stability: Not on file       Family History   Problem Relation Age of Onset    Depression Mother     Anxiety Disorder Mother     Depression Father     Ovarian Cancer Maternal Aunt         great aunt     ADHD Brother     Other Brother         autism     Osteoarthritis Maternal Grandmother     Cancer Maternal Grandfather         skin    COPD Maternal Grandfather     Lung Cancer Maternal Grandfather     ADHD Brother     Depression Brother     No Known Problems Paternal Grandmother     Colon Cancer Paternal Grandfather         COD    Diabetes type 2  Maternal Aunt     ADHD Son     Other Son         ODD        Allergies:    Codeine, Other, Turmeric, Tylenol [acetaminophen], and Vicodin [hydrocodone-acetaminophen]    Home Medications:  Prior to Admission medications    Medication Sig Start Date End Date Taking? Authorizing Provider   oxyCODONE-acetaminophen (PERCOCET) 5-325 MG per tablet Take 1 tablet by mouth every 6 hours as needed for Pain for up to 5 days. Intended supply: 3 days. Take lowest dose possible to manage pain 8/17/22 8/22/22 Yes Kaitlin Cartwright DO   gabapentin (NEURONTIN) 100 MG capsule Take 1 capsule by mouth 3 times daily for 30 days. Intended supply: 90 days 8/17/22 9/16/22 Yes Kaitlin Cartwright DO   acetaminophen (TYLENOL) 500 MG tablet Take 2 tablets by mouth every 8 hours as needed for Pain 8/14/22   Kaitlin Cartwright DO   methocarbamol (ROBAXIN) 500 MG tablet Take 1 tablet by mouth 3 times daily as needed (pain/muscle spasm) 8/14/22 8/19/22  Kaitlin Cartwright DO   naproxen (NAPROSYN) 500 MG tablet Take 1 tablet by mouth 2 times daily as needed for Pain 8/14/22   Kaitlin Cartwright DO   predniSONE (DELTASONE) 10 MG tablet Take 4 tablets by mouth once daily for 5 days 8/14/22 8/24/22  Kaitlin Cartwright DO   Elastic Bandages & Supports (WRIST SPLINT/COCK-UP/RIGHT SM) MISC 1 each by Does not apply route nightly as needed (wrist pain) 8/14/22   Kaitlin Cartwright DO   amitriptyline (ELAVIL) 25 MG tablet Take 1 tablet by mouth nightly 7/27/22   Salvatore Alfredo MD   rizatriptan (MAXALT) 10 MG tablet Take 1 tablet by mouth once as needed for Migraine May repeat in 2 hours if needed 7/27/22 7/27/22  Salvatore Alfredo MD   Bacillus Coagulans-Inulin (PROBIOTIC-PREBIOTIC PO) Take 2 tablets by mouth daily  Patient not taking: Reported on 7/27/2022    Historical Provider, MD       REVIEW OF SYSTEMS    (2-9 systems for level 4, 10 or more for level 5)    Review of Systems   Musculoskeletal:  Positive for arthralgias and myalgias. Skin:  Negative for color change. Neurological:  Positive for numbness. Negative for weakness.      PHYSICAL EXAM   (up to 7 for level 4, 8 or more for level 5)    VITALS:   Vitals:    08/17/22 2205 08/17/22 2208   BP: 131/86    Pulse: 85    Resp:  18   Temp: 97.9 °F (36.6 °C)    TempSrc: Oral    SpO2: 97%    Weight: 142 lb (64.4 kg)    Height: 5' 2\" (1.575 m)        Physical Exam  Vitals and nursing note reviewed. Constitutional:       General: She is in acute distress. Appearance: She is well-developed. She is not diaphoretic. HENT:      Head: Normocephalic and atraumatic. Eyes:      Conjunctiva/sclera: Conjunctivae normal.   Cardiovascular:      Rate and Rhythm: Normal rate and regular rhythm. Pulses: Normal pulses. Pulmonary:      Effort: Pulmonary effort is normal. No respiratory distress. Musculoskeletal:      Left elbow: No swelling, deformity or effusion. Decreased range of motion. Tenderness present. Cervical back: Normal range of motion. Skin:     General: Skin is warm and dry. Capillary Refill: Capillary refill takes less than 2 seconds. Findings: No rash. Neurological:      General: No focal deficit present. Mental Status: She is alert. Sensory: Sensory deficit present. Motor: Motor function is intact. Comments: Subjective decrease sensation to the third fourth and fifth digit of the right upper extremity   Psychiatric:         Behavior: Behavior normal.       DIFFERENTIAL  DIAGNOSIS   PLAN (LABS / IMAGING / EKG):  Orders Placed This Encounter   Procedures    [employer] Arm Sling, Right; L       MEDICATIONS ORDERED:  Orders Placed This Encounter   Medications    morphine sulfate (PF) injection 4 mg    orphenadrine (NORFLEX) injection 60 mg    diazePAM (VALIUM) tablet 5 mg    gabapentin (NEURONTIN) capsule 100 mg    oxyCODONE-acetaminophen (PERCOCET) 5-325 MG per tablet     Sig: Take 1 tablet by mouth every 6 hours as needed for Pain for up to 5 days. Intended supply: 3 days.  Take lowest dose possible to manage pain     Dispense:  20 tablet     Refill:  0    gabapentin (NEURONTIN) 100 MG capsule     Sig: Take 1 capsule by mouth 3 times daily for 30 days. Intended supply: 90 days     Dispense:  90 capsule     Refill:  0     DIAGNOSTIC RESULTS / EMERGENCYDEPARTMENT COURSE / MDM   LABS:  Labs Reviewed - No data to display    RADIOLOGY:  No results found. EMERGENCY DEPARTMENT COURSE:       MDM  Number of Diagnoses or Management Options  Entrapment of right ulnar nerve  Diagnosis management comments: I had the pleasure of seeing care of this young lady the last time she was in the emergency department. She was discharged home with naproxen, prednisone, Tylenol, Robaxin, tramadol. Taking these medications and scheduled follow-up appointments as directed, however pain was worsening. Tearful on exam.  Given IM morphine, IM Norflex. Oral Valium and oral Neurontin. Switch her tramadol to Percocet and started her on Neurontin. We discussed to the risks and benefits of these medications as they are potentially habit-forming and they are also controlled substances. However she is attempting to take the right steps to schedule follow-up appointments and this is delayed due to scheduling and she cannot avoid it. Sling on the provided for comfort. Amount and/or Complexity of Data Reviewed  Review and summarize past medical records: yes    Patient Progress  Patient progress: stable      PROCEDURES:  Procedures     CONSULTS:  None    CRITICAL CARE:  NONE    FINAL IMPRESSION     1. Entrapment of right ulnar nerve        DISPOSITION / PLAN   DISPOSITION Decision To Discharge 08/17/2022 11:27:08 PM      Evaluation and treatment course in the ED, and plan of care upon discharge was discussed in length with the patient. Patient had no further questions prior to being discharged and was instructed to return to the ED for new or worsening symptoms.   Any changes to existing medications or new prescriptions were reviewed with patient and they expressed understanding of how to correctly take their medications and the possible side

## 2022-08-19 ENCOUNTER — TELEPHONE (OUTPATIENT)
Dept: FAMILY MEDICINE CLINIC | Age: 32
End: 2022-08-19

## 2022-08-19 ENCOUNTER — OFFICE VISIT (OUTPATIENT)
Dept: FAMILY MEDICINE CLINIC | Age: 32
End: 2022-08-19
Payer: OTHER GOVERNMENT

## 2022-08-19 VITALS
TEMPERATURE: 97.8 F | DIASTOLIC BLOOD PRESSURE: 81 MMHG | HEART RATE: 88 BPM | BODY MASS INDEX: 28.16 KG/M2 | HEIGHT: 62 IN | SYSTOLIC BLOOD PRESSURE: 110 MMHG | WEIGHT: 153 LBS | OXYGEN SATURATION: 97 % | RESPIRATION RATE: 18 BRPM

## 2022-08-19 DIAGNOSIS — Z09 HOSPITAL DISCHARGE FOLLOW-UP: ICD-10-CM

## 2022-08-19 DIAGNOSIS — G56.21 ENTRAPMENT OF RIGHT ULNAR NERVE: Primary | ICD-10-CM

## 2022-08-19 DIAGNOSIS — M79.601 RIGHT ARM PAIN: ICD-10-CM

## 2022-08-19 PROCEDURE — 99213 OFFICE O/P EST LOW 20 MIN: CPT | Performed by: NURSE PRACTITIONER

## 2022-08-19 SDOH — ECONOMIC STABILITY: FOOD INSECURITY: WITHIN THE PAST 12 MONTHS, THE FOOD YOU BOUGHT JUST DIDN'T LAST AND YOU DIDN'T HAVE MONEY TO GET MORE.: NEVER TRUE

## 2022-08-19 SDOH — ECONOMIC STABILITY: FOOD INSECURITY: WITHIN THE PAST 12 MONTHS, YOU WORRIED THAT YOUR FOOD WOULD RUN OUT BEFORE YOU GOT MONEY TO BUY MORE.: NEVER TRUE

## 2022-08-19 ASSESSMENT — ENCOUNTER SYMPTOMS
ABDOMINAL PAIN: 0
GASTROINTESTINAL NEGATIVE: 1
NAUSEA: 0
VOMITING: 0
ABDOMINAL DISTENTION: 0
SHORTNESS OF BREATH: 0
DIARRHEA: 0
RESPIRATORY NEGATIVE: 1
CONSTIPATION: 0
BLOOD IN STOOL: 0
COUGH: 0
EYES NEGATIVE: 1

## 2022-08-19 ASSESSMENT — PATIENT HEALTH QUESTIONNAIRE - PHQ9
SUM OF ALL RESPONSES TO PHQ QUESTIONS 1-9: 0
SUM OF ALL RESPONSES TO PHQ QUESTIONS 1-9: 6
9. THOUGHTS THAT YOU WOULD BE BETTER OFF DEAD, OR OF HURTING YOURSELF: 0
SUM OF ALL RESPONSES TO PHQ QUESTIONS 1-9: 0
SUM OF ALL RESPONSES TO PHQ QUESTIONS 1-9: 6
SUM OF ALL RESPONSES TO PHQ9 QUESTIONS 1 & 2: 0
2. FEELING DOWN, DEPRESSED OR HOPELESS: 0
SUM OF ALL RESPONSES TO PHQ9 QUESTIONS 1 & 2: 0
1. LITTLE INTEREST OR PLEASURE IN DOING THINGS: 0
4. FEELING TIRED OR HAVING LITTLE ENERGY: 2
3. TROUBLE FALLING OR STAYING ASLEEP: 2
2. FEELING DOWN, DEPRESSED OR HOPELESS: 0
SUM OF ALL RESPONSES TO PHQ QUESTIONS 1-9: 0
5. POOR APPETITE OR OVEREATING: 1
7. TROUBLE CONCENTRATING ON THINGS, SUCH AS READING THE NEWSPAPER OR WATCHING TELEVISION: 1
8. MOVING OR SPEAKING SO SLOWLY THAT OTHER PEOPLE COULD HAVE NOTICED. OR THE OPPOSITE, BEING SO FIGETY OR RESTLESS THAT YOU HAVE BEEN MOVING AROUND A LOT MORE THAN USUAL: 0
SUM OF ALL RESPONSES TO PHQ QUESTIONS 1-9: 6
6. FEELING BAD ABOUT YOURSELF - OR THAT YOU ARE A FAILURE OR HAVE LET YOURSELF OR YOUR FAMILY DOWN: 0
SUM OF ALL RESPONSES TO PHQ QUESTIONS 1-9: 6
1. LITTLE INTEREST OR PLEASURE IN DOING THINGS: 0
10. IF YOU CHECKED OFF ANY PROBLEMS, HOW DIFFICULT HAVE THESE PROBLEMS MADE IT FOR YOU TO DO YOUR WORK, TAKE CARE OF THINGS AT HOME, OR GET ALONG WITH OTHER PEOPLE: 2
SUM OF ALL RESPONSES TO PHQ QUESTIONS 1-9: 0

## 2022-08-19 ASSESSMENT — SOCIAL DETERMINANTS OF HEALTH (SDOH): HOW HARD IS IT FOR YOU TO PAY FOR THE VERY BASICS LIKE FOOD, HOUSING, MEDICAL CARE, AND HEATING?: NOT HARD AT ALL

## 2022-08-19 NOTE — PATIENT INSTRUCTIONS
New Updates for [facility]    Thank you for choosing [facility] to give you the best care! [facility] ([facility]) is always trying to think of new ways to help their patients. We are asking all patients to try out the new digital registration that is now available through the [employer] [address]. Down load today! . Via the kishore you're now able to update your personal and registration information prior to your upcoming appointment. This will save you time once you arrive at the office to check-in, not to mention your information remains safe!! Many other perks come from signing up for an account, such as:  Requesting refills  Scheduling an appointment  Completing an E-Visit  Sending a message to the office/provider  Having access to your medication list  Paying your bill/copay prior to your appointment  Scheduling your yearly mammogram  Review your test results    If you are not familiar the [facility], please ask one of us and we will be happy to answer any questions or help you set-up your account. Gabapentin Titration Schedule  (Gabapentin 300mg tablets)    Increase gabapentin by 1 tablet every 3-4 days if pain or itching is not adequately controlled. If pain or itching is at a manageable level, continue daily with the same number of tablets and do not increase your dose further. The maximum number of tablets per day is 9 tablets.       Morning Mid-Day Bedtime   Initial dose 0 tablets 0 tablets 1 tablet   Dose increase #1 1 tablet 0 tablets 1 tablet   Dose increase #2 1 tablet 1 tablet 1 tablet   Dose increase #3 1 tablet 1 tablet 2 tablets   Dose increase #4 2 tablets 1 tablet 2 tablets   Dose increase #5 2 tablets 2 tablets 2 tablets   Dose increase #6 2 tablets 2 tablets 3 tablets   Dose increase #7 3 tablets 2 tablets 3 tablets   Dose increase #8 3 tablets 3 tablets 3 tablets     ** Common side effects of gabapentin include dizziness, fatigue, nausea, headache, and edema of the legs. If you develop intolerable side effects while taking gabapentin, decrease your number of daily pills to the point that your side effects are reduced or minimized.

## 2022-08-19 NOTE — LETTER
[address]  [facility] [address] [facility] [address]  Phone: [phone]  Fax: [phone]    ALEX Martinez CNP        August 19, 2022     Patient: Ken Mike   YOB: 1990   Date of Visit: 8/19/2022       To Whom It May Concern: It is my medical opinion that Ken Mike may return to work on 8/29/2022 with the following restrictions: lifting/carrying not to exceed 10 lbs. If you have any questions or concerns, please don't hesitate to call.     Sincerely,        ALEX Martinez CNP

## 2022-08-19 NOTE — PROGRESS NOTES
[address] [place] [address] [facility] [address]  Dept: [phone]    8/19/2022    CHIEF COMPLAINT    Chief Complaint   Patient presents with    Arm Pain    Arm ulnar nerve     Right arm, wrist, elbow. Has had Physical Therapy       HPI    Colonel Waller is a 27 y.o. female who presents   Chief Complaint   Patient presents with    Arm Pain    Arm ulnar nerve     Right arm, wrist, elbow. Has had Physical Therapy     Patient seen in the ER twice on 8/14 and 8/17 for right arm pain and right ulnar nerve entrapment. Patient was referred to Ortho Dr. Tri Daniels and neurosurgery through [facility]. She was given Tylenol, Robaxin, naproxen and prednisone for 5 days. Short prescription for tramadol 50 mg provided. Patient given Toradol injection while in the ER. She returns to the ER 3 days later since she felt like they medications-prescribed were not helping. She was provided a prescription for gabapentin 100 mg 3 times daily and a small prescription for Percocet 5-3 25. She was given an injection of morphine sulfate 4 mg, Norflex and a dose of Valium while in the ER. She is currently scheduled with neurosurgery on 10/31/2022 for her initial evalution. TODAY- gabapentin and naprosyn are helping some. Tylenol is helping mildly with pain. She has taken her Percocet twice since receiving the rx from the ER. She is trying to take Naproxyn and Tylenol more than the narcotic pain medications. Gabapentin is currently being taken PRN not regularly. She is planning on trying to go back to work on Monday. She was put on a restriction from the ER of lifting no more than 15 lbs. She is noting significant pain, numbness, tingling and weakness. She is right handed. Requesting to extend her leave from work and change her restriction from 15 lbs to 10 lbs.        Vitals:    08/19/22 1037   BP: 110/81   Site: Left Upper Arm   Position: Sitting Cuff Size: Large Adult   Pulse: 88   Resp: 18   Temp: 97.8 °F (36.6 °C)   TempSrc: Temporal   SpO2: 97%   Weight: 153 lb (69.4 kg)   Height: 5' 2\" (1.575 m)       Wt Readings from Last 3 Encounters:   08/19/22 153 lb (69.4 kg)   08/17/22 142 lb (64.4 kg)   08/14/22 132 lb (59.9 kg)     BP Readings from Last 3 Encounters:   08/19/22 110/81   08/17/22 131/86   08/14/22 130/83       REVIEW OF SYSTEMS    Review of Systems   Constitutional: Negative. Negative for chills, fatigue and fever. Eyes: Negative. Negative for visual disturbance. Respiratory: Negative. Negative for cough and shortness of breath. Cardiovascular: Negative. Negative for chest pain and palpitations. Gastrointestinal: Negative. Negative for abdominal distention, abdominal pain, blood in stool, constipation, diarrhea, nausea and vomiting. Genitourinary: Negative. Negative for difficulty urinating and frequency. Musculoskeletal:  Positive for arthralgias (right arm pain). Neurological:  Positive for weakness and numbness. Negative for dizziness and headaches.      PAST MEDICAL HISTORY    Past Medical History:   Diagnosis Date    Anxiety     Attention-deficit hyperactivity disorder 6/4/2021    Chronic low back pain     Chronic low back pain     Depression     Endometriosis     Fibromyalgia 2/5/2020    Gastroesophageal reflux disease without esophagitis 4/25/2021    HPV (human papilloma virus) anogenital infection     Hyperhidrosis     PTSD (post-traumatic stress disorder)        FAMILY HISTORY    Family History   Problem Relation Age of Onset    Depression Mother     Anxiety Disorder Mother     Depression Father     Ovarian Cancer Maternal Aunt         great aunt     ADHD Brother     Other Brother         autism     Osteoarthritis Maternal Grandmother     Cancer Maternal Grandfather         skin    COPD Maternal Grandfather     Lung Cancer Maternal Grandfather     ADHD Brother     Depression Brother     No Known Problems Paternal Grandmother     Colon Cancer Paternal Grandfather         COD    Diabetes type 2  Maternal Aunt     ADHD Son     Other Son         ODD        SOCIAL HISTORY    Social History     Socioeconomic History    Marital status:      Spouse name: None    Number of children: 1    Years of education: None    Highest education level: None   Occupational History    Occupation: stay at home mom    Tobacco Use    Smoking status: Former     Packs/day: 0.50     Years: 17.00     Pack years: 8.50     Types: Cigarettes     Quit date:      Years since quittin.6    Smokeless tobacco: Former     Quit date: 2020   Vaping Use    Vaping Use: Former    Quit date: 3/25/2020   Substance and Sexual Activity    Alcohol use: Yes     Comment: social     Drug use: No    Sexual activity: Yes     Partners: Male     Birth control/protection: Patch     Social Determinants of Health     Financial Resource Strain: Low Risk     Difficulty of Paying Living Expenses: Not hard at all   Food Insecurity: No Food Insecurity    Worried About [address] in the Last Year: Never true    [address] in the Last Year: Never true   Transportation Needs: No Transportation Needs    Lack of Transportation (Medical): No    Lack of Transportation (Non-Medical): No       SURGICAL HISTORY    Past Surgical History:   Procedure Laterality Date     SECTION  2008    COLPOSCOPY      , 2011 x 2     ENDOMETRIAL BIOPSY  2020    results were WNL     TONSILLECTOMY      WISDOM TOOTH EXTRACTION         CURRENT MEDICATIONS    Current Outpatient Medications   Medication Sig Dispense Refill    oxyCODONE-acetaminophen (PERCOCET) 5-325 MG per tablet Take 1 tablet by mouth every 6 hours as needed for Pain for up to 5 days. Intended supply: 3 days. Take lowest dose possible to manage pain 20 tablet 0    gabapentin (NEURONTIN) 100 MG capsule Take 1 capsule by mouth 3 times daily for 30 days.  Intended supply: 90 days 90 capsule 0    acetaminophen (TYLENOL) 500 MG tablet Take 2 tablets by mouth every 8 hours as needed for Pain 30 tablet 0    methocarbamol (ROBAXIN) 500 MG tablet Take 1 tablet by mouth 3 times daily as needed (pain/muscle spasm) 15 tablet 0    naproxen (NAPROSYN) 500 MG tablet Take 1 tablet by mouth 2 times daily as needed for Pain 30 tablet 0    predniSONE (DELTASONE) 10 MG tablet Take 4 tablets by mouth once daily for 5 days 20 tablet 0    Elastic Bandages & Supports (WRIST SPLINT/COCK-UP/RIGHT SM) MISC 1 each by Does not apply route nightly as needed (wrist pain) 1 each 0    amitriptyline (ELAVIL) 25 MG tablet Take 1 tablet by mouth nightly 30 tablet 5    rizatriptan (MAXALT) 10 MG tablet Take 1 tablet by mouth once as needed for Migraine May repeat in 2 hours if needed 30 tablet 3    Bacillus Coagulans-Inulin (PROBIOTIC-PREBIOTIC PO) Take 2 tablets by mouth daily (Patient not taking: No sig reported)       No current facility-administered medications for this visit. ALLERGIES    Allergies   Allergen Reactions    Codeine Other (See Comments)     Per genetic testing she has found that she over-metabolizes medication     Other Hives     Paprika     Turmeric Hives    Tylenol [Acetaminophen] Other (See Comments)     Per genetic testing she has found that she over-metabolizes medication     Vicodin [Hydrocodone-Acetaminophen] Other (See Comments)     Per genetic testing she has found that she over-metabolizes medication        PHYSICAL EXAM   Physical Exam  Vitals and nursing note reviewed. Constitutional:       General: She is not in acute distress. Appearance: She is well-developed. She is not diaphoretic. Interventions: Face mask in place. HENT:      Head: Normocephalic. Right Ear: Hearing normal.      Left Ear: Hearing normal.   Eyes:      General:         Right eye: No discharge. Left eye: No discharge. Pupils: Pupils are equal.   Cardiovascular:      Rate and Rhythm: Normal rate and regular rhythm. Pulses: Normal pulses. Radial pulses are 2+ on the right side and 2+ on the left side. Heart sounds: Normal heart sounds, S1 normal and S2 normal. No murmur heard. Pulmonary:      Effort: Pulmonary effort is normal. No respiratory distress. Breath sounds: Normal breath sounds. No wheezing. Musculoskeletal:      Right elbow: Decreased range of motion. Tenderness present in radial head, medial epicondyle, lateral epicondyle and olecranon process. Right forearm: Swelling and tenderness present. Right wrist: Swelling and tenderness present. No deformity. Decreased range of motion. Normal pulse. Left wrist: Normal pulse. Cervical back: Normal range of motion. Comments: Wearing right arm sling as provided by ER. Given Rx for cock-up splint for right wrist.    Skin:     General: Skin is warm and dry. Neurological:      Mental Status: She is alert and oriented to person, place, and time. Psychiatric:         Behavior: Behavior normal. Behavior is cooperative. ASSESSMENT/PLAN  1. Entrapment of right ulnar nerve  2. Hospital discharge follow-up     Continue taking medications as prescribed by the ER. Trial increasing gabapentin. Titration orders provided in AVS.   Additional time off provided along with adjusted restrictions. Referrals pended for Dr. Brennen Newman to sign on Monday as Deborah requires an MD to sign. Alem received counseling on the following healthy behaviors: nutrition, exercise, and medication adherence  Reviewed prior labs and health maintenance. Continue current medications, diet and exercise. Discussed use, benefit, and side effects of prescribed medications. Barriers to medication compliance addressed. Patient given educational materials - see patient instructions. All patient questions answered. Patient voiced understanding. Return in about 2 months (around 10/19/2022) for right arm pain .     (Please note that portions of this note were completed with a voice recognition program. Efforts were made to edit the dictations but occasionally words are mis-transcribed.)      Electronically signed by ALEX Sullivan CNP on 8/19/22 at 10:47 AM ARI

## 2022-08-19 NOTE — TELEPHONE ENCOUNTER
Referrals not accepted from NP with [facility]. Please sign pended referrals. Patient aware this will not be done until Monday.

## 2022-09-19 NOTE — TELEPHONE ENCOUNTER
Patient called upset her referrals were not sent to [facility] and also stated she needs a referral for Physical Therapy. Writer called [facility] fax number is [phone]. Both referrals for Ortho and Neuro were faxed 9/19 at 12:25pm    Please place order for Physical Therapy and fax immediately to number above.

## 2022-09-19 NOTE — TELEPHONE ENCOUNTER
Thank you for faxing the two referrals. PT order pended. Dr. Dionisio Canela please sign order so staff can fax.

## 2022-09-30 ENCOUNTER — TELEPHONE (OUTPATIENT)
Dept: FAMILY MEDICINE CLINIC | Age: 32
End: 2022-09-30

## 2022-09-30 DIAGNOSIS — M79.601 RIGHT ARM PAIN: Primary | ICD-10-CM

## 2022-09-30 NOTE — TELEPHONE ENCOUNTER
[facility] called and stated that the referral for PT needs to be changed to OT and refax order please.  Thank you

## 2022-10-03 NOTE — TELEPHONE ENCOUNTER
Patient has a positive pregnancy test needs a referral for a OB/GYN Referral signed.   Please fax and notify patient

## 2022-10-05 ENCOUNTER — HOSPITAL ENCOUNTER (OUTPATIENT)
Dept: OCCUPATIONAL THERAPY | Facility: CLINIC | Age: 32
Setting detail: THERAPIES SERIES
Discharge: HOME | End: 2022-10-05
Payer: OTHER GOVERNMENT

## 2022-10-05 PROCEDURE — 97110 THERAPEUTIC EXERCISES: CPT

## 2022-10-05 PROCEDURE — 97165 OT EVAL LOW COMPLEX 30 MIN: CPT

## 2022-10-05 NOTE — CONSULTS
[] [facility]       Occupational Therapy 1st floor       [address] [place] [place], [place]         Phone: [phone]       Fax: [phone] [x] Kevin [facility] Occupational Therapy  [address] [facility] [address]   [place], [place]  Phone: [phone]  Fax: [phone]       Occupational Therapy Hand & Upper Extremity  Initial Evaluation    Date: 10/5/2022      Patient: Quiana Barnard  : 1990  MRN: [id number]  Referring Provider:  ALEX White CNP  Insurance: Other Deborah Jama after eval)  Medical Diagnosis: Right arm pain (M79.601)   Rehab Codes: pain in elbow M25.52,, pain in hand M79.646,, pain in wrist M25.53,, numbness R20.2,, pain in right forearm M79.631,, or pain in right hand M79.641,  Onset Date: 22    Next Dr. Dennis Nito: 10/19/22    Subjective:   CC: Pn in R elbow/forearm/hand w/ numbness and tingling. Pn is constant and impacts ADLs/IADLs, work and sleep   HPI: Pt reports her symptoms began in  and gotten worse over time. Pt states she has gone to the ED twice since onset due to pn. Symptoms peaked in  which caused her to seek treatment from PT. Unfortunately PT did not provide relief of symptoms and pt felt she was not getting the answers she was looking for. Pt states that her symptoms have impacted her ability to care for her special needs son, ability to complete tasks at work, and negatively impacts her sleep. Pt reports numbness and tingling pn that begins in her R elbow and travels down into her hand and digits. Pt reports pn primarily in digits 2-5 w/ intermittent symptoms in thumb. Mechanism of Injury: unremarkable  Surgery Date:  Precautions:  Other 10lbs weight limit at work     Involved Extremity: Right   Dominant Extremity: Right    Past Medical History: Unremarkable          Comorbidities: NA    Medications: Refer to Medical chart in [facility]  Allergies:  Refer to Medical chart in [facility]    Pain: Intensity:   7/10 Location: Elbow grossly Pain Type: constant, with movement/activity, and with sedentary activity  Pain Altered Tx: no  Action Taken:none            Home Environment:    Pt lives in a  and House With 4+ and L Handrail MALINI  The washing machine is on and the lower level (basement)    Vocation  [employer]    Job Status []Normal duty [x]Light duty [] Off due to condition []Retired  []Not employed []Disability  []Other:  []  Return to work: Work activities/duties  Feeding animals      Avocational Activities  Special needs child    [x] [address]     [] Grandparenting     [] Care giver [x] OTHER    [] NA       ADL/IADL Previous level of function Current level of function Who assists or modifications made or needed   Bathing  [x] Independent    [] Assist  [x] Independent    [] Assist     Dress/grooming [x] Independent    [] Assist  [x] Independent    [] Assist     Transfer/mobility [x] Independent    [] Assist  [x] Independent    [] Assist     Feeding [x] Independent    [] Assist  [x] Independent    [] Assist     Toileting [x] Independent    [] Assist  [x] Independent    [] Assist     Driving [x] Independent    [] Assist  [x] Independent    [] Assist     Housekeeping [x] Independent    [] Assist  [x] Independent    [] Assist     Grocery shop/meal prep [x] Independent    [] Assist  [x] Independent    [] Assist       Device: Independent   Orthosis: Currently has and Type elbow orthosis and wrist orthosis    Objective: Tests/Measurements: Upper Extremity Functional Index  Current Functional Level:  24/80 functionally impaired as measured with the Upper Extremity Functional Index Survey. 0-80 scale, with 80 = no Deficits  (The UEFI model does not provide any specific cut off points that could classify the upper limb disability degree, however, a minimal detectable change of 9 points is provided. This means that for improvement or deterioration to be considered, between two subsequent evaluations, the scores must differ by at least 9 points.)    Sensibility: Numbness, Tingling, and Constant Edema: none noted     Skin Color: Normal Skin/Scar condition Not tested    Problems: Pain, Strength, and Function     Special tests  Test Right  Left   Tinel's  - [place]  - Medial epicondyle  - Cubital retinaculum  - [place] of [place]  - 2 heads of FCU  - Guyon's canal   P  P  P  P  P  P   N  N  N  N  N  N   Ligamentous      Valgus stress N N   Varus stress N N   Moving Valgus Stress Test NT NT   Lateral epicondylagia      Maudsley's test NT NT   Mill's test NT NT   Cozen's test NT NT   Medial Epicondylalgia     Golfer's elbow test NT NT       UE ROM/MMT   Right Left MMT Right MMT Left   Shoulder       Flexion [facility] [place] -4/5 5/5   Extension [facility] -4/5 5/5   Adduction [facility] -4/5 5/5   Abduction [facility] -4/5 5/5   Internal Rotation [facility] [place] -4/5 5/5   External Rotation [facility] WFL -4/5 5/5   Elbow       Flexion [facility] -4/5 5/5   Extension [facility] WFL -4/5 5/5   Pronation [facility] WFL -4/5 5/5   Supination  [facility] WFL -4/5 5/5   Wrist       Flexion [facility] WFL +4/5 5/5   Extension [facility] [place] +4/5 5/5   Radial deviation [facility] W[facility] +4/5 5/5   Ulnar deviation [facility] WFL +4/5 5/5        COORDINATION  - Fine Motor (speed/dexterity)     Right in seconds Percentile Left in seconds Percentile   9 Hole Peg Test 22  23         STRENGTH       Right   (pounds) Left   (pounds)    position 1 46 34    position 2 40 (83%) 48   Lateral pinch 16 (94%) 17   2 point pinch 9 8   3 jaw pinch 9 (90%) 10     Bilateral  strength is normally symmetrical or up to 10% stronger on the dominant extremity, depending on the individual's physically activity level. Assessment:  Patient would benefit from skilled occupational therapy services in order to:  Improve, Increase, and Maximize  functional /grasp, Strength, Activity tolerance, and Complaint of pain in order to Ensure safe return to work, improve functional use of UE in ADL performance, decrease pain in UE for safe completion of ADLs, and return to PLOF     Short Term Goals: (  8    Treatments)  Decrease Pain: By 3 point on numeric pain scale   Increase strength (pounds); of /pinch strength to >/= non dominate L UE  Increase function:UE Functional Index Score 10 or more points to promote increased functional abilities  Pt will report ability to complete tasks at work using R UE   Pt will reported decreased pain in R UE during HEP  Pt will report reduction in OTC NSAIDs to control pain throughout the day  Patient to be independent with home exercise program as demonstrated by performance with correct form without cues. Long Term Goals: (  15  Treatments)  Decrease Pain: By 5 point on numeric pain scale   Increase strength (pounds); of /pinch strength to >/= non dominate L UE  Increase function:UE Functional Index Score 15 or more points to promote increased functional abilities  Pt will report ability to complete tasks at work using R UE w/out compensating w/ L UE. Patient Goals: \"To get back to normal\"    Treatment Potential: Fair Suggested Professional Referral: No  Domestic Concerns: No   Barriers to Goal Achievement: No    Comments/Assessment: Pt is a 32 yr/old R hand dominate female who presents on this date c/o signs and symptoms ulnar nerve pn. Pt positive for tinel's sign at the R medical epicondyle, cubital tunnel, [place] of [place], heads of FCU, and Guyon's cannel. Pt TTP along the ulnar nerve pathway. Positive tinel's at the carpal tunnel, however symptoms not as severe in median nerve as ulnar nerve. Pt presents w/ decreased /pinch strength in dominate R UE as compared to unaffected L UE. Pt presents w/ decreased MMT grossly in R UE w/ pn w/ resistive elbow/wrist testing. Pt's UEFI score indicates pt in subjectively reporting difficulty completing ADLs/IADLs due to pn in R UE.  Signs and symptoms are consistent w/ possible ulnar nerve entrapment in R cubital tunnel resulting in numbness/tingling and pn in R forearm and digits. Possible median nerve entrapment, however ulnar nerve presents w/ greater severity. Pt would benefit from skilled OT to address pn, strength,and independence w/ ADLs/IADLs w/ the R UE. Home Program Initiated: Written, Verbal, and Demo Comprehension of Education: Yes and Needs Review       Plans, Goals, Benefits, Discussed with, and Patient    Treatment Plan:   [x]  Therapeutic Exercise   18469              []  Iontophoresis: 4 mg/mL Dexamethasone Sodium Phosphate  mAmin  45413    [x]  Therapeutic Activity  69515  []  Vasopneumatic cold with compression  96709                []  ADL training  69291  []  Ultrasound        [id number]    []  Neuromuscular Re-education  17586  []  Electrical Stimulation Attended  92658    [x]  Manual Therapy  06724  Orthotic    []  Fit  46168     []  Train 03372    [x]  Instruction in [address]    []  Fit [id number]      []  Train 27833    []  Cognitive Interventions, (first 15 min 37457, subsequent 15 min 43902)  []  Cold/hotpack      [x]  Massage   08637           []  Medication allergies reviewed for use of    Dexamethasone Sodium Phosphate 4mg/ml     with iontophoresis treatments. Pt is not allergic. Treatment Plan:  Frequency: 2 x/week for 16 visits     Today's Treatment:  Modalities:  Precautions:    Exercise Flow Sheet:  Exercise Reps/Time Weight/Level Comments   Putty 2 minutes each tan Completed/HEP  Composite , tip to tip pinch, 3 jaw sharon, lateral pinch, rolling   Tendon gliding 10 AROM Completed/HEP   Ulnar nerve glide 10  Completed/HEP  Prayer glide                     Other:     Specific Instructions for Next Treatment:     Evaluation Complexity:  History (Personal factors, comorbidities) [x]  0 []  1-2 []  3+   Exam (limitations, restrictions) [x]  1-2 []  3 []  4+   Decision Making [x]  Low []  Moderate []  High   ?  [x]  Low Complexity []  Moderate   Complexity []  High Complexity      Treatment Charges: Mins Units Time In/Out Evaluation  []  High  []  Moderate  [x]  Low  45  1    [x]  Ther Exercise 10 1    []  Manual Therapy      []  Ther Activities      []  Orthotic fit/train      []  Orthotic recheck      []        Total Treatment time 55 min 2      Time In: 2643   Time out: 1200 Electronically signed by Michael Brandt OT on 10/5/2022 at [address]    Physician Signature: _________________________ Date: _______________  By signing above or cosigning this note, I have reviewed this plan of care and certify a need for medically necessary rehabilitation services.      *PLEASE SIGN ABOVE AND FAX BACK ALL PAGES*

## 2022-10-26 ENCOUNTER — HOSPITAL ENCOUNTER (OUTPATIENT)
Dept: OCCUPATIONAL THERAPY | Facility: CLINIC | Age: 32
Setting detail: THERAPIES SERIES
Discharge: HOME | End: 2022-10-26
Payer: OTHER GOVERNMENT

## 2022-10-26 PROCEDURE — 97140 MANUAL THERAPY 1/> REGIONS: CPT

## 2022-10-26 PROCEDURE — 97110 THERAPEUTIC EXERCISES: CPT

## 2022-10-26 NOTE — FLOWSHEET NOTE
[] [facility]       Occupational Therapy            1st floor       [address], [place]         Phone: [phone]       Fax: [phone] [] [facility] Therapy  [address], [place]  Phone: [phone]  Fax: [phone]      Occupational Therapy Daily Treatment Note    Date:  10/26/2022  Patient Name:  Amber Brownlee    :  1990  MRN: [id number]  Referring Provider:  ALEX Monson CNP  Insurance: Other Deborah Loving approved for 19 visits)  Medical Diagnosis: Right arm pain (M79.601)           Rehab Codes: pain in elbow M25.52,, pain in hand M79.646,, pain in wrist M25.53,, numbness R20.2,, pain in right forearm M79.631,, or pain in right hand M79.641,  Onset Date: 22                 Next  [address]: 10/31/22  Visit# / total visits: ; Progress note for Medicare patient due at visit 8-9    Cancels/No Shows: 0/0      Subjective:    Pain:  [] Yes  [x] No Location:  N/A  Pain Rating: (0-10 scale) 0/10  Pain altered Tx:  [] No  [] Yes  Action:  Pt Comments: Pt reports her pn has been \"up and down\" all day. Pt reports she is going to see a neuro surgeon on 10/31/22.     Objective:  Modalities:  Precautions: n/a  Exercises:  Exercise Flow Sheet:  Exercise Reps/Time Weight/Level Comments   Putty 2 minutes each tan Completed/HEP  Composite , tip to tip pinch, 3 jaw sharon, lateral pinch, rolling   Tendon gliding 10 AROM Completed/HEP   Ulnar nerve glide 10   Completed/HEP  Prayer glide  Palm to face   Flexbar  Wrist Flexion/Ext  Wrist pronation  Wrist supination  Wrist ulnar deviation  Wrist radial dev   1x15 Yellow  Completed    Resistive pronation/supination  1x15 2lbs Completed   Gripping  1x15   Digiflex (red)  Metal gripper (35lbs)       Treatment Charges: Mins Units   []  Modalities:      []  Ultrasound     [x]  Ther Exercise 40 3   [x]  Manual Therapy 10 1   []  Ther Activities     []  Orthotic fit/train     []  Orthotic recheck     []  Other     Total Treatment time 50 4       Assessment: [x] Progressing toward goals. Today is pt's 1st return visit since IIE on 10/5/22. Pt reported completing HEP as directed. Pt continues to c/o ulnar nerve pn and discomfort. Pt brought in 3 pre-alex braces she has tried to reduce her pn. OT dicussed she should only wear the pre-alex wrist/hand brace. Reviewed all HEP w/ pt. Began tx w/ massage throughout the ulnar nerve pathway. Began /forearm strengthening for R UE. Pt able to complete all strengthening exercises w/ min c/o tingling in 5th digits on R hand. Overall pt tolerated tx well on this date and is happy she is going to see a neuro surgeon on 10/31/22     [] No change. [] Other     [x] Patient would continue to benefit from skilled occupational therapy services in order to: Improve, Increase, and Maximize  functional /grasp, Strength, Activity tolerance, and Complaint of pain in order to Ensure safe return to work, improve functional use of UE in ADL performance, decrease pain in UE for safe completion of ADLs, and return to PLOF      STG/LTG    Short Term Goals: (  8    Treatments)  Decrease Pain: By 3 point on numeric pain scale   Increase strength (pounds); of /pinch strength to >/= non dominate L UE  Increase function:UE Functional Index Score 10 or more points to promote increased functional abilities  Pt will report ability to complete tasks at work using R UE   Pt will reported decreased pain in R UE during HEP  Pt will report reduction in OTC NSAIDs to control pain throughout the day  Patient to be independent with home exercise program as demonstrated by performance with correct form without cues.      Long Term Goals: (  15  Treatments)  Decrease Pain: By 5 point on numeric pain scale   Increase strength (pounds); of /pinch strength to >/= non dominate L UE  Increase function:UE Functional Index Score 15 or more points to promote increased functional abilities  Pt

## 2022-10-31 ENCOUNTER — OFFICE VISIT (OUTPATIENT)
Dept: NEUROSURGERY | Age: 32
End: 2022-10-31
Payer: OTHER GOVERNMENT

## 2022-10-31 VITALS
DIASTOLIC BLOOD PRESSURE: 72 MMHG | SYSTOLIC BLOOD PRESSURE: 112 MMHG | OXYGEN SATURATION: 98 % | BODY MASS INDEX: 28.16 KG/M2 | HEIGHT: 62 IN | HEART RATE: 98 BPM | WEIGHT: 153 LBS | TEMPERATURE: 96 F

## 2022-10-31 DIAGNOSIS — G56.01 RIGHT CARPAL TUNNEL SYNDROME: ICD-10-CM

## 2022-10-31 DIAGNOSIS — G56.21 CUBITAL CANAL COMPRESSION SYNDROME, RIGHT: ICD-10-CM

## 2022-10-31 DIAGNOSIS — G56.21 GUYON SYNDROME, RIGHT: Primary | ICD-10-CM

## 2022-10-31 PROCEDURE — 99205 OFFICE O/P NEW HI 60 MIN: CPT | Performed by: NEUROLOGICAL SURGERY

## 2022-10-31 NOTE — PROGRESS NOTES
[address]  [facility] [address], [address] [address]  Dept: [phone]    Patient:  Krunal Leonard  YOB: 1990  Date: 10/31/22    The patient is a 27 y.o. female who presents today for consult of the following problems:     Chief Complaint   Patient presents with    Follow-up             HPI:     Krunal Leonard is a 27 y.o. female on whom neurosurgical consultation was requested by Adrian Marcos MD for management of severe right upper extremity peripheral neuropathy. Patient relays a progressive severe symptomology of right upper extremity numbness tingling radiating pain and joint pain. She relays specifically severe pain in the right elbow as well as the wrist that radiates into the hand with numbness and tingling involving the pinky ring finger as well as the middle finger and parts of the thumb as well. Symptoms have provided her nocturnal awakening at multiple times where she has to wake up and shake the hand out. She has resorted to the point of having to sleep outside of her normal bed due to the severity of the symptoms and does awaken multiple times at night. She did obtain splints on her own that were extension splints and did wear them for a period of 2 months for the elbow and did not help at all in terms of her symptoms. Her pain weakness as well as numbness has been progressive over time to the point where she is now dropping things and having trouble with dexterity of her right hand in addition to persistent numbness that does not remit. Has periods of exacerbation involving severe pain and paresthesias in predominate the ring and pinky finger of the right hand as well as partially in the next finger and the thumb. Denies any issues that are involving the left upper extremity. Has been to the ER and some was subsequently referred here.   Patient was given Neurontin and seen by her PCP for the same issue. Alec Patella History:     Past Medical History:   Diagnosis Date    Anxiety     Attention-deficit hyperactivity disorder 2021    Chronic low back pain     Chronic low back pain     Depression     Endometriosis     Fibromyalgia 2020    Gastroesophageal reflux disease without esophagitis 2021    HPV (human papilloma virus) anogenital infection     Hyperhidrosis     PTSD (post-traumatic stress disorder)      Past Surgical History:   Procedure Laterality Date     SECTION      COLPOSCOPY      , 2011 x 2     ENDOMETRIAL BIOPSY  2020    results were WNL     TONSILLECTOMY      WISDOM TOOTH EXTRACTION       Family History   Problem Relation Age of Onset    Depression Mother     Anxiety Disorder Mother     Depression Father     Ovarian Cancer Maternal Aunt         great aunt     ADHD Brother     Other Brother         autism     Osteoarthritis Maternal Grandmother     Cancer Maternal Grandfather         skin    COPD Maternal Grandfather     Lung Cancer Maternal Grandfather     ADHD Brother     Depression Brother     No Known Problems Paternal Grandmother     Colon Cancer Paternal Grandfather         COD    Diabetes type 2  Maternal Aunt     ADHD Son     Other Son         ODD      Current Outpatient Medications on File Prior to Visit   Medication Sig Dispense Refill    gabapentin (NEURONTIN) 100 MG capsule Take 1 capsule by mouth 3 times daily for 30 days.  Intended supply: 90 days 90 capsule 0    acetaminophen (TYLENOL) 500 MG tablet Take 2 tablets by mouth every 8 hours as needed for Pain 30 tablet 0    naproxen (NAPROSYN) 500 MG tablet Take 1 tablet by mouth 2 times daily as needed for Pain 30 tablet 0    Elastic Bandages & Supports (WRIST SPLINT/COCK-UP/RIGHT SM) MISC 1 each by Does not apply route nightly as needed (wrist pain) 1 each 0    amitriptyline (ELAVIL) 25 MG tablet Take 1 tablet by mouth nightly 30 tablet 5    rizatriptan (MAXALT) 10 MG tablet Take 1 tablet by mouth once as needed for Migraine May repeat in 2 hours if needed 30 tablet 3    Bacillus Coagulans-Inulin (PROBIOTIC-PREBIOTIC PO) Take 2 tablets by mouth daily (Patient not taking: No sig reported)       No current facility-administered medications on file prior to visit. Social History     Tobacco Use    Smoking status: Former     Packs/day: 0.50     Years: 17.00     Pack years: 8.50     Types: Cigarettes     Quit date:      Years since quittin.8    Smokeless tobacco: Former     Quit date: 2020   Vaping Use    Vaping Use: Former    Quit date: 3/25/2020   Substance Use Topics    Alcohol use: Yes     Comment: social     Drug use: No       Allergies   Allergen Reactions    Codeine Other (See Comments)     Per genetic testing she has found that she over-metabolizes medication     Other Hives     Paprika     Turmeric Hives    Tylenol [Acetaminophen] Other (See Comments)     Per genetic testing she has found that she over-metabolizes medication     Vicodin [Hydrocodone-Acetaminophen] Other (See Comments)     Per genetic testing she has found that she over-metabolizes medication        Review of Systems  ROS: numbness, paresthesias, weakness    Physical Exam:      /72   Pulse 98   Temp (!) 96 °F (35.6 °C) (Temporal)   Ht 5' 2\" (1.575 m)   Wt 153 lb (69.4 kg)   SpO2 98%   BMI 27.98 kg/m²   Estimated body mass index is 27.98 kg/m² as calculated from the following:    Height as of this encounter: 5' 2\" (1.575 m). Weight as of this encounter: 153 lb (69.4 kg). General:  Stephanie Ocasio is a 27y.o. year old female who appears her stated age. HEENT: Normocephalic atraumatic. Neck supple. Chest: regular rate; pulses equal. Equal chest rise and fall  Abdomen: Soft nondistended.    Ext: DP equal with good capillary refill  Neuro    Mentation  Appropriate affect   oriented    Cranial Nerves:   Pupils equal and reactive to light  Extraocular motion intact  Face symmetric  No dysarthria  v1-3 sensation symmetric, masseter tone symmetric  Hearing symmetric and intact to finger rub    Sensation:   Abnormal sensation R median and ulnar including dorsal ulnar cutaneous branch    Motor  L deltoid 5/5; R deltoid 5/5  L biceps 5/5; R biceps 5/5  L triceps 5/5; R triceps 5/5  L wrist extension 5/5; R wrist extension 5/5  L intrinsics 5/5; R intrinsics 5/5     L iliopsoas 5/5 , R iliopsoas 5/5  L quadriceps 5/5; R quadriceps 5/5  L Dorsiflexion 5/5; R dorsiflexion 5/5  L Plantarflexion 5/5; R plantarflexion 5/5  L EHL 5/5; R EHL 5/5    Reflexes  L Brachioradialis 2+/4; R brachioradialis 2+/4  L Biceps 2+/4; R Biceps 2+/4  L Triceps 2+/4; R Triceps 2+/4  L Patellar 2+/4: R Patellar 2+/4  L Achilles 2+/4; R Achilles 2+/4    hoffmans L: neg  hoffmans R: neg  Clonus L: neg  Clonus R: neg  Babinski L: up  Babinski R; up    Positive Tinel's right cubital tunnel. Positive Tinel's at the carpal tunnel and Guyon's canal.  Positive Phalen's in the right hand involving both median nerve as well as ulnar nerve distribution. Negative Benedictine. Froment's. Negative Benedictine. Positive ring finger split with hyperesthesia over the ulnar distribution of the right hand. Negative Benedictine Wartenberg Tinel's Phalen's Froment's on the left upper extremity. Negative Spurling's negative Lhermitte's  Studies Review:     X-ray the right elbow does reveal tendinosis over the medial epicondyle. Assessment and Plan:      1. Guyon syndrome, right    2. Cubital canal compression syndrome, right    3. Right carpal tunnel syndrome          Plan: Clinically the patient has symptoms consistent with median neuropathy as well as ulnar neuropathy at the cubital tunnel and Guyon's canal.  She is describing some pain over the antecubital fossa of the right arm extending her right thumb  There is some weakness with a Froment's sign.   This raises concern regarding an anterior interosseous syndrome versus a more proximal median neuropathy. We will try to obtain an urgent EMG of the right upper extremity with an inching study of the median nerve to better localize. Tentative plan will be for right-sided cubital tunnel release in addition to right median nerve release and Guyon's canal release in the same setting as long as there is no other pathology that appears to be evident of a more proximal median nerve or component compression    Followup: No follow-ups on file. Prescriptions Ordered:  No orders of the defined types were placed in this encounter. Orders Placed:  No orders of the defined types were placed in this encounter. Electronically signed by Victor Manuel Hill DO on 10/31/2022 at 11:26 AM    Please note that this chart was generated using voice recognition Dragon dictation software. Although every effort was made to ensure the accuracy of this automated transcription, some errors in transcription may have occurred.

## 2022-11-02 ENCOUNTER — HOSPITAL ENCOUNTER (OUTPATIENT)
Dept: OCCUPATIONAL THERAPY | Facility: CLINIC | Age: 32
Setting detail: THERAPIES SERIES
Discharge: HOME | End: 2022-11-02
Payer: OTHER GOVERNMENT

## 2022-11-02 PROCEDURE — 97140 MANUAL THERAPY 1/> REGIONS: CPT

## 2022-11-02 PROCEDURE — 97110 THERAPEUTIC EXERCISES: CPT

## 2022-11-02 NOTE — FLOWSHEET NOTE
[] [facility]       Occupational Therapy            1st floor       [address], [place]         Phone: [phone]       Fax: [phone] [x] [facility]  [address]  [place], [place]  Phone: [phone]  Fax: [phone]      Occupational Therapy Daily Treatment Note    Date:  2022  Patient Name:  Jo-Ann De Paz    :  1990  MRN: [id number]  Referring Provider:  ALEX Owusu CNP  Insurance: Other Deborah Worley approved for 19 visits)  Medical Diagnosis: Right arm pain (M79.601)           Rehab Codes: pain in elbow M25.52,, pain in hand M79.646,, pain in wrist M25.53,, numbness R20.2,, pain in right forearm M79.631,, or pain in right hand M79.641,  Onset Date: 22                 Next Dr. Kody Moody: 10/31/22  Visit# / total visits: 3/19; Progress note for Medicare patient due at visit 8-9    Cancels/No Shows: 0/0      Subjective:    Pain:  [] Yes  [x] No Location:  N/A  Pain Rating: (0-10 scale) 0/10  Pain altered Tx:  [x] No  [] Yes  Action:  Pt Comments: Pt states her am and hand have been more numb and tingling today. Pt reports seeing neuro surgeon, she states she is having an EMG on 23. Following EMG pt will be scheduled for a cubital tunnel, carpal tunnel, and guyon canal release.       Objective:  Modalities:  Precautions: n/a  Exercises:  Exercise Flow Sheet:  Exercise Reps/Time Weight/Level Comments   Putty 2 minutes each tan Completed/HEP  Composite , tip to tip pinch, 3 jaw sharon, lateral pinch, rolling   Tendon gliding 10 AROM Completed/HEP   Ulnar nerve glide 10   Completed/HEP  Prayer glide  Palm to face   Flexbar  Wrist Flexion/Ext  Wrist pronation  Wrist supination  Wrist ulnar deviation  Wrist radial dev   1x15 Yellow  Completed    Resistive pronation/supination  1x15 2lbs Completed   Gripping  1x15   Digiflex (red)  Metal gripper (35lbs)   Serratus anterior slides 2x10 AROM Completed   Shoulder extension  2x10 yellow Completed   rows 2x10 yellow Completed       Treatment Charges: Mins Units   []  Modalities:      []  Ultrasound     [x]  Ther Exercise 35 2   [x]  Manual Therapy 15 1   []  Ther Activities     []  Orthotic fit/train     []  Orthotic recheck     []  Other     Total Treatment time 50 3       Assessment: [x] Progressing toward goals. Pt arrived to OT reporting more nerve symptoms today than normal. Pt is scheduled for an EMG on 1/23/23 for median and ulnar nerves in R UE. Pt will be scheduled for cubital tunnel, carpal tunnel, and guyon canal release following EMG. Discussed what OT would entail following sx. Pt cont to report numbness and tingling in R UE. Cont massage throughout the ulnar nerve pathway. Pt able to complete all strengthening exercises w/ min c/o tingling in R hand. Began postural strengthening exercises on this date. Pt tolerated them well. Pt is happy she is scheduled for EMG and will be having sx.     [] No change. [] Other     [x] Patient would continue to benefit from skilled occupational therapy services in order to: Improve, Increase, and Maximize  functional /grasp, Strength, Activity tolerance, and Complaint of pain in order to Ensure safe return to work, improve functional use of UE in ADL performance, decrease pain in UE for safe completion of ADLs, and return to PLOF      STG/LTG    Short Term Goals: (  8    Treatments)  Decrease Pain: By 3 point on numeric pain scale   Increase strength (pounds); of /pinch strength to >/= non dominate L UE  Increase function:UE Functional Index Score 10 or more points to promote increased functional abilities  Pt will report ability to complete tasks at work using R UE   Pt will reported decreased pain in R UE during HEP  Pt will report reduction in OTC NSAIDs to control pain throughout the day  Patient to be independent with home exercise program as demonstrated by performance with correct form without cues.      Long Term Goals: (  15  Treatments)  Decrease Pain: By 5 point on numeric pain scale   Increase strength (pounds); of /pinch strength to >/= non dominate L UE  Increase function:UE Functional Index Score 15 or more points to promote increased functional abilities  Pt will report ability to complete tasks at work using R UE w/out compensating w/ L UE. Pt. Education:  [x] Yes  [] No  [] Reviewed Prior HEP/Ed  Method of Education: [x] Verbal  [] Demo  [] Written  Re: discussed OT for after sx for cubital tunnel release  Comprehension of Education:  [x] Verbalizes understanding. [] Demonstrates understanding. [] Needs review. [] Demonstrates/verbalizes HEP/Ed previously given. Plan: [x] Continue current frequency toward short and long term goals.   [x] Specific Instructions for subsequent treatments: Con nerve glides    [] Other:       Time In: 0900  Time Out: 1501        Electronically signed by:  BIPIN Grady/IZZY

## 2022-11-03 ENCOUNTER — OFFICE VISIT (OUTPATIENT)
Dept: NEUROLOGY | Age: 32
End: 2022-11-03
Payer: OTHER GOVERNMENT

## 2022-11-03 ENCOUNTER — TELEPHONE (OUTPATIENT)
Dept: NEUROLOGY | Age: 32
End: 2022-11-03

## 2022-11-03 DIAGNOSIS — G56.01 CARPAL TUNNEL SYNDROME OF RIGHT WRIST: Primary | ICD-10-CM

## 2022-11-03 PROCEDURE — 95908 NRV CNDJ TST 3-4 STUDIES: CPT | Performed by: PSYCHIATRY & NEUROLOGY

## 2022-11-03 PROCEDURE — 95886 MUSC TEST DONE W/N TEST COMP: CPT | Performed by: PSYCHIATRY & NEUROLOGY

## 2022-11-03 NOTE — TELEPHONE ENCOUNTER
[email or web address], [phone], was informed (via automated generator) that patient does not need a prior authorization for EMG. Also contacted the [place], [phone], Analisa Rivero stated that she is not able to find records of said patient.

## 2022-11-03 NOTE — PROGRESS NOTES
[facility] [address] [facility]    [phone] phone  [phone] fax          Name: Makenzie Mac Patient ID: [id number]   Gender: Female Date of Exam: 11/3/2022   Age: 27 y YOB: 1990   Height:  Weight:    Referring Physician:    Examining Physician: Javier Chadwick MD        Patient History:     Patient has been having numbness tingling and weakness in the right upper extremity. She has been referred for EMG to rule out carpal tunnel syndrome     Motor Nerve Conduction:    Nerve and Site Latency Amplitude Segment Latency  Difference Distance Conduction  Velocity            Median. R         Wrist 3.2 ms 4.0 mV Abductor pollicis brevis-Wrist 3.2 ms 70 mm  m/s   Elbow 6.5 ms 4.8 mV Wrist-Elbow 3.3 ms 215 mm 55 m/s   Ulnar. R         Wrist 2.6 ms 10.0 mV Abductor digiti minimi (manus)-Wrist 2.6 ms 70 mm  m/s   Below elbow 5.4 ms 10.3 mV Wrist-Below elbow 2.8 ms 185 mm 66 m/s   Above elbow 6.9 ms 8.6 mV Below elbow-Above elbow 1.5 ms 100 mm 67 m/s            D4 7.0 ms 7.2 mV   ms  mm  m/s   D2 6.5 ms 9.7 mV D4-D2 0.5 ms 20 mm 40 m/s   P 6.1 ms 10.1 mV D2-P 0.4 ms 20 mm 50 m/s   P2 5.7 ms 11.0 mV P-P2 0.4 ms 20 mm 50 m/s       Sensory Nerve Conduction:    Nerve and Site Onset Latency Peak  Latency Amplitude Segment Latency  Difference Distance Conduction  Velocity             Median. R          Wrist (Median) 1.7 ms 2.3 ms 36 mV Mid palm-Wrist (Median) 1.7 ms 80 mm 47 m/s     ms  ms  mV Wrist (Median)-Wrist (Ulnar) 0.6 ms  mm  m/s   Ulnar. R          Wrist (Ulnar) 1.1 ms 1.8 ms 21 mV Mid palm-Wrist (Ulnar) 1.1 ms 80 mm 73 m/s         EMG Summary Table     Spontaneous MUAP Recruitment    IA Fib PSW Fasc H.F. Amp Dur.  PPP Pattern   R. FDI N None None None None N N N N   R. EIP N None None None None N N N N   R. PT N None None None None N N N N   R. BICEPS N None None None None N N N N   R. FDS N None None None None N N N N   R. FCR N None None None None N N N N   R. DELTOID N None None None None N N N N   R. TRICEPS N None None None None N N N N     Interpretation:  Right median and ulnar motor studies showed normal DMLs, CMAP amplitudes, motor nerve conduction studies and F-wave latencies. Right median transcarpal sensory studies showed prolonged  peak latency, amplitude and sensory nerve conduction studies. Right ulnar  sensory study showed normal peak latency, amplitude and sensory nerve conduction velocity. Monopolar EMG study of the selected muscles showed no evidence of active denervation or chronic reinnervation. Full interference pattern was seen. Conclusion: This was a borderline abnormal neurophysiological study indicative of right mild median sensory neuropathy at the level suspect from early carpal tunnel syndrome.   There was no evidence of right median motor neuropathy, anterior interosseous neuropathy or ulnar neuropathy on this exam.    __________________________________  Geovanna Multani MD  Diplomat [employer] Board of Psychiatry and Neurology  [address] [facility] of Neuro[facility]

## 2022-11-07 ENCOUNTER — HOSPITAL ENCOUNTER (OUTPATIENT)
Dept: OCCUPATIONAL THERAPY | Facility: CLINIC | Age: 32
Setting detail: THERAPIES SERIES
Discharge: HOME | End: 2022-11-07
Payer: OTHER GOVERNMENT

## 2022-11-07 PROCEDURE — 97110 THERAPEUTIC EXERCISES: CPT

## 2022-11-07 PROCEDURE — 97140 MANUAL THERAPY 1/> REGIONS: CPT

## 2022-11-07 NOTE — FLOWSHEET NOTE
[] [facility]       Occupational Therapy            1st floor       [address] [place], [place]         Phone: [phone]       Fax: [phone] [x] [facility]  [address]  [place], [place]  Phone: [phone]  Fax: [phone]      Occupational Therapy Daily Treatment Note    Date:  2022  Patient Name:  Yg Feng    :  1990  MRN: [id number]  Referring Provider:  ALEX Cook CNP  Insurance: Other Deborah Wood approved for 19 visits)  Medical Diagnosis: Right arm pain (M79.601)           Rehab Codes: pain in elbow M25.52,, pain in hand M79.646,, pain in wrist M25.53,, numbness R20.2,, pain in right forearm M79.631,, or pain in right hand M79.641,  Onset Date: 22                 Next Dr. Saucedo Peoples: 22  Visit# / total visits: ; Progress note for Medicare patient due at visit 8-9    Cancels/No Shows: 0/0      Subjective:    Pain:  [] Yes  [x] No Location:  N/A  Pain Rating: (0-10 scale) 0/10  Pain altered Tx:  [x] No  [] Yes  Action:  Pt Comments: Pt reports she completed EMG on 11/3/22. Pt states she has a virtual appointment w/ MD to discuss EMG results.  Pt states her R elbow/hand is more symptomatic today d/t work    Objective:  Modalities:  Precautions: n/a  Exercises:  Exercise Flow Sheet:  Exercise Reps/Time Weight/Level Comments   Putty 2 minutes each tan Completed/HEP  Composite , tip to tip pinch, 3 jaw sharon, lateral pinch, rolling   Tendon gliding 10 AROM Completed/HEP   Ulnar nerve glide 10   Completed/HEP  Prayer glide  Palm to face   Flexbar  Wrist Flexion/Ext  Wrist pronation  Wrist supination  Wrist ulnar deviation  Wrist radial dev   1x15 Yellow  Completed    Resistive pronation/supination  1x15 2lbs Completed   Gripping  1x15   Digiflex (red)  Metal gripper (35lbs)   Serratus anterior slides 2x10 AROM Completed   Shoulder extension  2x10 yellow Completed   rows 2x10 yellow Completed Resistive pinching  red Half of bucket of sponges       Treatment Charges: Mins Units   []  Modalities:      []  Ultrasound     [x]  Ther Exercise 36 2   [x]  Manual Therapy 10 1   []  Ther Activities     []  Orthotic fit/train     []  Orthotic recheck     []  Other     Total Treatment time 46 3       Assessment: [x] Progressing toward goals. Pt arrived to OT more symptomatic d/t work. EMG on 7/4/22 and will discuss results on 11/16. Completed massage over ulnar nerve pathway beginning in  R elbow. Pt completed all strengthening exercises w/ mod c/o tingling in R hand. Cont postural strengthening exercises. Discussed POC to see pt for next 3 scheduled visits and will determine at that time if pt wishes to cont OT or DC. Pt is in agreement w/ this POC. [] No change. [] Other     [x] Patient would continue to benefit from skilled occupational therapy services in order to: Improve, Increase, and Maximize  functional /grasp, Strength, Activity tolerance, and Complaint of pain in order to Ensure safe return to work, improve functional use of UE in ADL performance, decrease pain in UE for safe completion of ADLs, and return to PLOF      STG/LTG    Short Term Goals: (  8    Treatments)  Decrease Pain: By 3 point on numeric pain scale   Increase strength (pounds); of /pinch strength to >/= non dominate L UE  Increase function:UE Functional Index Score 10 or more points to promote increased functional abilities  Pt will report ability to complete tasks at work using R UE   Pt will reported decreased pain in R UE during HEP  Pt will report reduction in OTC NSAIDs to control pain throughout the day  Patient to be independent with home exercise program as demonstrated by performance with correct form without cues.      Long Term Goals: (  15  Treatments)  Decrease Pain: By 5 point on numeric pain scale   Increase strength (pounds); of /pinch strength to >/= non dominate L UE  Increase function:UE Functional Index Score 15 or more points to promote increased functional abilities  Pt will report ability to complete tasks at work using R UE w/out compensating w/ L UE. Pt. Education:  [] Yes  [x] No  [] Reviewed Prior HEP/Ed  Method of Education: [] Verbal  [] Demo  [] Written  Re:   Comprehension of Education:  [] Verbalizes understanding. [] Demonstrates understanding. [] Needs review. [] Demonstrates/verbalizes HEP/Ed previously given. Plan: [x] Continue current frequency toward short and long term goals.   [x] Specific Instructions for subsequent treatments: Con nerve glides    [] Other:       Time In: 1600  Time Out: 6366        Electronically signed by:  BIPIN Gaytan/IZZY

## 2022-11-09 ENCOUNTER — HOSPITAL ENCOUNTER (OUTPATIENT)
Dept: OCCUPATIONAL THERAPY | Facility: CLINIC | Age: 32
Setting detail: THERAPIES SERIES
Discharge: HOME | End: 2022-11-09
Payer: OTHER GOVERNMENT

## 2022-11-09 PROCEDURE — 97140 MANUAL THERAPY 1/> REGIONS: CPT

## 2022-11-09 PROCEDURE — 97110 THERAPEUTIC EXERCISES: CPT

## 2022-11-09 NOTE — FLOWSHEET NOTE
[] [facility]       Occupational Therapy            1st floor       [address] [place], [place]         Phone: [phone]       Fax: [phone] [x] [facility]  [address] [place], [place]  Phone: [phone]  Fax: [phone]      Occupational Therapy Daily Treatment Note    Date:  2022  Patient Name:  Alexander Han    :  1990  MRN: [id number]  Referring Provider:  ALEX Castillo CNP  Insurance: Other Deborah Luna approved for 19 visits)  Medical Diagnosis: Right arm pain (M79.601)           Rehab Codes: pain in elbow M25.52,, pain in hand M79.646,, pain in wrist M25.53,, numbness R20.2,, pain in right forearm M79.631,, or pain in right hand M79.641,  Onset Date: 22                 Next  [address]: 22  Visit# / total visits: ; Progress note for Medicare patient due at visit 8-9    Cancels/No Shows: 0/0      Subjective:    Pain:  [] Yes  [x] No Location:  N/A  Pain Rating: (0-10 scale) 8/10  Pain altered Tx:  [] No  [x] Yes  Action: decreased exercises performed and provided heat at conclusion of tx  Pt Comments: Pt reports she has been having an increased in pn over the last two days. Pt states her pn is in the back of her arm and elbow.      Objective:  Modalities:  Precautions: n/a  Exercises:  Exercise Flow Sheet:  Exercise Reps/Time Weight/Level Comments   Putty 2 minutes each tan Completed/HEP  Composite , tip to tip pinch, 3 jaw sharon, lateral pinch, rolling   Tendon gliding 10 AROM Completed/HEP   Ulnar nerve glide 10   Completed/HEP  Prayer glide  Palm to face   Flexbar  Wrist Flexion/Ext  Wrist pronation  Wrist supination  Wrist ulnar deviation  Wrist radial dev   1x15 Yellow  Completed    Resistive pronation/supination  1x15 2lbs Completed   Gripping  1x15   Digiflex (red)  Metal gripper (35lbs)   Serratus anterior slides 2x10 AROM  Not Completed   Shoulder extension  2x10 yellow Not Completed   rows 2x10 yellow Not Completed   Resistive pinching  red Half of bucket of sponges       Treatment Charges: Mins Units   [x]  Modalities: heat 8    []  Ultrasound     [x]  Ther Exercise 27 2   [x]  Manual Therapy 10 1   []  Ther Activities     []  Orthotic fit/train     []  Orthotic recheck     []  Other     Total Treatment time 45 3       Assessment: [x] Progressing toward goals. Pt arrived to OT c/o increased pn in posterior of R elbow. Pt was TTP over triceps tendon and distal triceps muscle. Pt 's elbow pn does not appear to be connected w/ ulnar nerve entrapment she is being seen for and is consistent w/ possible triceps strain. OT adjusted tx to reduce strain on R triceps. OT provided massage over R triceps and ulnar nerve pathway. Continued  and pinch strengthening exercises. Concluded tx w/ moist heat to R elbow to reduce on in elbow. Pt tolerated tx well on this date      Pt completed all strengthening exercises w/ mod c/o tingling in R hand. [] No change. [] Other     [x] Patient would continue to benefit from skilled occupational therapy services in order to:   Improve, Increase, and Maximize  functional /grasp, Strength, Activity tolerance, and Complaint of pain in order to Ensure safe return to work, improve functional use of UE in ADL performance, decrease pain in UE for safe completion of ADLs, and return to PLOF      STG/LTG    Short Term Goals: (  8    Treatments)  Decrease Pain: By 3 point on numeric pain scale   Increase strength (pounds); of /pinch strength to >/= non dominate L UE  Increase function:UE Functional Index Score 10 or more points to promote increased functional abilities  Pt will report ability to complete tasks at work using R UE   Pt will reported decreased pain in R UE during HEP  Pt will report reduction in OTC NSAIDs to control pain throughout the day  Patient to be independent with home exercise program as demonstrated by performance with correct form without cues.     Long Term Goals: (  15  Treatments)  Decrease Pain: By 5 point on numeric pain scale   Increase strength (pounds); of /pinch strength to >/= non dominate L UE  Increase function:UE Functional Index Score 15 or more points to promote increased functional abilities  Pt will report ability to complete tasks at work using R UE w/out compensating w/ L UE. Pt. Education:  [] Yes  [x] No  [] Reviewed Prior HEP/Ed  Method of Education: [] Verbal  [] Demo  [] Written  Re:   Comprehension of Education:  [] Verbalizes understanding. [] Demonstrates understanding. [] Needs review. [] Demonstrates/verbalizes HEP/Ed previously given. Plan: [x] Continue current frequency toward short and long term goals.   [x] Specific Instructions for subsequent treatments: Con nerve glides    [] Other:       Time In: 1055  Time Out: 1140        Electronically signed by:  BIPIN Dunlap/IZZY

## 2022-11-14 ENCOUNTER — HOSPITAL ENCOUNTER (OUTPATIENT)
Dept: OCCUPATIONAL THERAPY | Facility: CLINIC | Age: 32
Setting detail: THERAPIES SERIES
Discharge: HOME | End: 2022-11-14
Payer: OTHER GOVERNMENT

## 2022-11-14 PROCEDURE — 97140 MANUAL THERAPY 1/> REGIONS: CPT

## 2022-11-14 PROCEDURE — 97110 THERAPEUTIC EXERCISES: CPT

## 2022-11-14 NOTE — FLOWSHEET NOTE
[] [facility]       Occupational Therapy            1st floor       [address] [place], [place]         Phone: [phone]       Fax: [phone] [x] [facility]  [address]  [place], [place]  Phone: [phone]  Fax: [phone]      Occupational Therapy Daily Treatment Note    Date:  2022  Patient Name:  Mari Bowden    :  1990  MRN: [id number]  Referring Provider:  ALEX Holder CNP  Insurance: Other Deborah Wagner approved for 19 visits)  Medical Diagnosis: Right arm pain (M79.601)           Rehab Codes: pain in elbow M25.52,, pain in hand M79.646,, pain in wrist M25.53,, numbness R20.2,, pain in right forearm M79.631,, or pain in right hand M79.641,  Onset Date: 22                 Next Dr. Mae Romerogar: 22  Visit# / total visits: ; Progress note for Medicare patient due at visit 8-9    Cancels/No Shows: 0/0      Subjective:    Pain:  [] Yes  [x] No Location:  N/A  Pain Rating: (0-10 scale) 5/10  Pain altered Tx:  [x] No  [] Yes  Action:   Pt Comments: Pt reports she was in a lot of pn last night. Pt reports pn has been located in volar elbow.      Objective:  Modalities:  Precautions: n/a  Exercises:  Exercise Flow Sheet:  Exercise Reps/Time Weight/Level Comments   Putty 2 minutes each tan Completed/HEP  Composite , tip to tip pinch, 3 jaw sharon, lateral pinch, rolling   Tendon gliding 10 AROM Completed/HEP   Ulnar nerve glide 10   Completed/HEP  Prayer glide  Palm to face   Flexbar  Wrist Flexion/Ext  Wrist pronation  Wrist supination  Wrist ulnar deviation  Wrist radial dev   1x15 Yellow  Completed    Resistive pronation/supination  1x15 2lbs Completed   Gripping  1x15   Digiflex (red)  Metal gripper (35lbs)   Serratus anterior slides 2x10 AROM Completed   Shoulder extension  2x10 yellow Completed   rows 2x10 yellow Completed   Resistive pinching  red Half of bucket of sponges       Treatment Charges: Mins Units []  Modalities:     []  Ultrasound     [x]  Ther Exercise 30 2   [x]  Manual Therapy 15 1   []  Ther Activities     []  Orthotic fit/train     []  Orthotic recheck     []  Other     Total Treatment time 45 3       Assessment: [x] Progressing toward goals. Pt arrived to OT reporting she was in a [place] lot\" of pn last night and even small movements w/ her R UE wear very painful. Pt c/o of numbness and pn in R hand/digits w/ massage to the forearm. Pt c/o of increased numbness w/ ulnar nerve glides. OT adjusted tx accordingly and had pt stop nerve glides. Adjusted strengthening exercises d/t complaint of pn. Decreased resistance for exercises Pt tolerated tx well on this date. Dicussed POC w/ pt to address at next appointment after pt meets w/ MD ro review results of EMG. [] No change. [] Other     [x] Patient would continue to benefit from skilled occupational therapy services in order to: Improve, Increase, and Maximize  functional /grasp, Strength, Activity tolerance, and Complaint of pain in order to Ensure safe return to work, improve functional use of UE in ADL performance, decrease pain in UE for safe completion of ADLs, and return to PLOF      STG/LTG    Short Term Goals: (  8    Treatments)  Decrease Pain: By 3 point on numeric pain scale   Increase strength (pounds); of /pinch strength to >/= non dominate L UE  Increase function:UE Functional Index Score 10 or more points to promote increased functional abilities  Pt will report ability to complete tasks at work using R UE   Pt will reported decreased pain in R UE during HEP  Pt will report reduction in OTC NSAIDs to control pain throughout the day  Patient to be independent with home exercise program as demonstrated by performance with correct form without cues.      Long Term Goals: (  15  Treatments)  Decrease Pain: By 5 point on numeric pain scale   Increase strength (pounds); of /pinch strength to >/= non dominate L UE  Increase function:UE Functional Index Score 15 or more points to promote increased functional abilities  Pt will report ability to complete tasks at work using R UE w/out compensating w/ L UE. Pt. Education:  [] Yes  [x] No  [] Reviewed Prior HEP/Ed  Method of Education: [] Verbal  [] Demo  [] Written  Re:   Comprehension of Education:  [] Verbalizes understanding. [] Demonstrates understanding. [] Needs review. [] Demonstrates/verbalizes HEP/Ed previously given. Plan: [x] Continue current frequency toward short and long term goals.   [x] Specific Instructions for subsequent treatments: discuss POC at next appointment    [] Other:       Time In: 1600  Time Out: 4540        Electronically signed by:  BIPIN Roberson/IZZY

## 2022-11-16 ENCOUNTER — HOSPITAL ENCOUNTER (OUTPATIENT)
Dept: OCCUPATIONAL THERAPY | Facility: CLINIC | Age: 32
Setting detail: THERAPIES SERIES
Discharge: HOME | End: 2022-11-16
Payer: OTHER GOVERNMENT

## 2022-11-16 ENCOUNTER — TELEMEDICINE (OUTPATIENT)
Dept: NEUROSURGERY | Age: 32
End: 2022-11-16
Payer: OTHER GOVERNMENT

## 2022-11-16 DIAGNOSIS — G56.21 CUBITAL CANAL COMPRESSION SYNDROME, RIGHT: ICD-10-CM

## 2022-11-16 DIAGNOSIS — G56.21 GUYON SYNDROME, RIGHT: Primary | ICD-10-CM

## 2022-11-16 DIAGNOSIS — G56.01 RIGHT CARPAL TUNNEL SYNDROME: ICD-10-CM

## 2022-11-16 PROCEDURE — 97140 MANUAL THERAPY 1/> REGIONS: CPT

## 2022-11-16 PROCEDURE — 99214 OFFICE O/P EST MOD 30 MIN: CPT | Performed by: NEUROLOGICAL SURGERY

## 2022-11-16 PROCEDURE — 97110 THERAPEUTIC EXERCISES: CPT

## 2022-11-16 NOTE — FLOWSHEET NOTE
[] [facility]       Occupational Therapy            1st floor       [address]         Phone: [phone]       Fax: [phone] [x] [facility]  [address]  Phone: [phone]  Fax: [phone]      Occupational Therapy Daily Treatment Note    Date:  2022  Patient Name:  Nicolas Hoyt    :  1990  MRN: [id number]  Referring Provider:  ALEX Ureña CNP  Insurance: Other Deborah Lopez approved for 19 visits)  Medical Diagnosis: Right arm pain (M79.601)           Rehab Codes: pain in elbow M25.52,, pain in hand M79.646,, pain in wrist M25.53,, numbness R20.2,, pain in right forearm M79.631,, or pain in right hand M79.641,  Onset Date: 22                 Next [address]: 22  Visit# / total visits: ; Progress note for Medicare patient due at visit 8-9    Cancels/No Shows: 0/0      Subjective:    Pain:  [] Yes  [x] No Location:  N/A  Pain Rating: (0-10 scale) 5/10  Pain altered Tx:  [x] No  [] Yes  Action:   Pt Comments: Pt reports she is scheduled surgery on  for cubital tunnel, Guyon's, carpal tunnel release.       Objective:  Modalities:  Precautions: n/a  Exercises:  Exercise Flow Sheet:  Exercise Reps/Time Weight/Level Comments   Putty 2 minutes each tan Completed/HEP  Composite , tip to tip pinch, 3 jaw sharon, lateral pinch, rolling   Tendon gliding 10 AROM Completed/HEP   Ulnar nerve glide 10   Completed/HEP  Prayer glide  Palm to face   Flexbar  Wrist Flexion/Ext  Wrist pronation  Wrist supination  Wrist ulnar deviation  Wrist radial dev   1x15 Yellow  Completed    Resistive pronation/supination  1x15 2lbs Completed   Gripping  1x15   Digiflex (red)  Metal gripper (35lbs)   Serratus anterior slides 2x10 AROM Completed   Shoulder extension  2x10 yellow Completed   rows 2x10 yellow Completed   Resistive pinching  red Half of bucket of sponges       Treatment Charges: Mins Units   []  Modalities:     []  Ultrasound     [x]  Ther Exercise 25 2   [x]  Manual Therapy 15 1   []  Ther Activities     []  Orthotic fit/train     []  Orthotic recheck     []  Other     Total Treatment time 40 3       Assessment: [x] Progressing toward goals. Pt arrived to OT reporting she is scheduled for carpel         she was in a [place] lot\" of pn last night and even small movements w/ her R UE wear very painful. Pt c/o of numbness and pn in R hand/digits w/ massage to the forearm. Pt c/o of increased numbness w/ ulnar nerve glides. OT adjusted tx accordingly and had pt stop nerve glides. Adjusted strengthening exercises d/t complaint of pn. Decreased resistance for exercises Pt tolerated tx well on this date. Dicussed POC w/ pt to address at next appointment after pt meets w/ MD ro review results of EMG. [] No change. [] Other     [x] Patient would continue to benefit from skilled occupational therapy services in order to: Improve, Increase, and Maximize  functional /grasp, Strength, Activity tolerance, and Complaint of pain in order to Ensure safe return to work, improve functional use of UE in ADL performance, decrease pain in UE for safe completion of ADLs, and return to PLOF      STG/LTG    Short Term Goals: (  8    Treatments)  Decrease Pain: By 3 point on numeric pain scale   Increase strength (pounds); of /pinch strength to >/= non dominate L UE  Increase function:UE Functional Index Score 10 or more points to promote increased functional abilities  Pt will report ability to complete tasks at work using R UE   Pt will reported decreased pain in R UE during HEP  Pt will report reduction in OTC NSAIDs to control pain throughout the day  Patient to be independent with home exercise program as demonstrated by performance with correct form without cues.      Long Term Goals: (  15  Treatments)  Decrease Pain: By 5 point on numeric pain scale   Increase strength (pounds); of /pinch strength to >/= non dominate L UE  Increase function:UE Functional Index Score 15 or more points to promote increased functional abilities  Pt will report ability to complete tasks at work using R UE w/out compensating w/ L UE. Pt. Education:  [x] Yes  [] No  [] Reviewed Prior HEP/Ed  Method of Education: [] Verbal  [] Demo  [] Written  Re: discussed POC to DC at next visit   Comprehension of Education:  [x] Verbalizes understanding. [] Demonstrates understanding. [] Needs review. [] Demonstrates/verbalizes HEP/Ed previously given. Plan: [x] Continue current frequency toward short and long term goals.   [x] Specific Instructions for subsequent treatments: POC to DC pt at next appointment prior to sx.    [] Other:       Time In: 1102  Time Out: 2093 [facility]        Electronically signed by:  BIPIN Jain/IZZY

## 2022-11-16 NOTE — PROGRESS NOTES
2022    TELEHEALTH EVALUATION -- Audio/Visual (During XXJTH-37 public health emergency)    HPI:    Stephanie House (:  1990) has requested an audio/video evaluation for the following concern(s):    Patient with a significant mount of pain in the right upper extremity involving the median nerve distribution as well as the ulnar nerve distribution. Also with pain at the elbow and the wrist that has been ongoing for some time and been refractory to splinting for greater than 6 weeks. 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 Her main complaint is persistent numbness in the median and ulnar distribution of the hand along with pain radiating from the wrist and the elbow that has been ongoing and worsening. She has been compensating by utilizing her left upper extremity and states that this is now started to cause some symptoms in the left arm as well. Has had issues with dropping things  strength as well as general coordination of the hand. Does have nocturnal awakening with symptoms. Underwent recent EMG with only findings of mild right-sided median neuropathy    Review of Systems    Prior to Visit Medications    Medication Sig Taking? Authorizing Provider   gabapentin (NEURONTIN) 100 MG capsule Take 1 capsule by mouth 3 times daily for 30 days.  Intended supply: 90 days  Kaitlin Yancey DO   acetaminophen (TYLENOL) 500 MG tablet Take 2 tablets by mouth every 8 hours as needed for Pain  Kaitlin Yancey DO   naproxen (NAPROSYN) 500 MG tablet Take 1 tablet by mouth 2 times daily as needed for Pain  Kaitlin Yancey DO   Elastic Bandages & Supports (WRIST SPLINT/COCK-UP/RIGHT SM) MISC 1 each by Does not apply route nightly as needed (wrist pain)  Kaitlin Yancey DO   amitriptyline (ELAVIL) 25 MG tablet Take 1 tablet by mouth nightly  Yoel Edmond MD   rizatriptan (MAXALT) 10 MG tablet Take 1 tablet by mouth once as needed for Migraine May repeat in 2 hours if needed  Gideon Harden MD   Bacillus Coagulans-Inulin (PROBIOTIC-PREBIOTIC PO) Take 2 tablets by mouth daily  Patient not taking: No sig reported  Historical Provider, MD       Social History     Tobacco Use    Smoking status: Former     Packs/day: 0.50     Years: 17.00     Pack years: 8.50     Types: Cigarettes     Quit date: 2018     Years since quittin.8    Smokeless tobacco: Former     Quit date: 2020   Vaping Use    Vaping Use: Former    Quit date: 3/25/2020   Substance Use Topics    Alcohol use: Yes     Comment: social     Drug use: No        Allergies   Allergen Reactions    Codeine Other (See Comments)     Per genetic testing she has found that she over-metabolizes medication     Other Hives     Paprika     Turmeric Hives    Tylenol [Acetaminophen] Other (See Comments)     Per genetic testing she has found that she over-metabolizes medication     Vicodin [Hydrocodone-Acetaminophen] Other (See Comments)     Per genetic testing she has found that she over-metabolizes medication    ,   Past Medical History:   Diagnosis Date    Anxiety     Attention-deficit hyperactivity disorder 2021    Chronic low back pain     Chronic low back pain     Depression     Endometriosis     Fibromyalgia 2020    Gastroesophageal reflux disease without esophagitis 2021    HPV (human papilloma virus) anogenital infection     Hyperhidrosis     PTSD (post-traumatic stress disorder)    ,   Past Surgical History:   Procedure Laterality Date     SECTION      COLPOSCOPY      , 2011 x 2     ENDOMETRIAL BIOPSY  2020    results were WNL     TONSILLECTOMY      WISDOM TOOTH EXTRACTION     ,   Social History     Tobacco Use    Smoking status: Former     Packs/day: 0.50     Years: 17.00     Pack years: 8.50     Types: Cigarettes     Quit date: 2018     Years since quittin.8    Smokeless tobacco: Former     Quit date: 2020   Vaping Use Vaping Use: Former    Quit date: 3/25/2020   Substance Use Topics    Alcohol use: Yes     Comment: social     Drug use: No   ,   Family History   Problem Relation Age of Onset    Depression Mother     Anxiety Disorder Mother     Depression Father     Ovarian Cancer Maternal Aunt         great aunt     ADHD Brother     Other Brother         autism     Osteoarthritis Maternal Grandmother     Cancer Maternal Grandfather         skin    COPD Maternal Grandfather     Lung Cancer Maternal Grandfather     ADHD Brother     Depression Brother     No Known Problems Paternal Grandmother     Colon Cancer Paternal Grandfather         COD    Diabetes type 2  Maternal Aunt     ADHD Son     Other Son         ODD        PHYSICAL EXAMINATION:  [ INSTRUCTIONS:  \"[x]\" Indicates a positive item  \"[]\" Indicates a negative item  -- DELETE ALL ITEMS NOT EXAMINED]  Vital Signs: (As obtained by patient/caregiver or practitioner observation)    Blood pressure-  Heart rate-    Respiratory rate-    Temperature-  Pulse oximetry-     Constitutional: [x] Appears well-developed and well-nourished [x] No apparent distress      [] Abnormal-   Mental status  [x] Alert and awake  [x] Oriented to person/place/time [x]Able to follow commands      Eyes:  EOM    [x]  Normal  [] Abnormal-  Sclera  [x]  Normal  [] Abnormal -         Discharge []  None visible  [] Abnormal -    HENT:   [x] Normocephalic, atraumatic.   [] Abnormal   [x] Mouth/Throat: Mucous membranes are moist.     External Ears [x] Normal  [] Abnormal-     Neck: [x] No visualized mass     Pulmonary/Chest: [x] Respiratory effort normal.  [x] No visualized signs of difficulty breathing or respiratory distress        [] Abnormal-      Musculoskeletal:   [x] Normal gait with no signs of ataxia         [x] Normal range of motion of neck        [] Abnormal-       Neurological:        [x] No Facial Asymmetry (Cranial nerve 7 motor function) (limited exam to video visit)          [x] No gaze palsy [] Abnormal-         Skin:        [x] No significant exanthematous lesions or discoloration noted on facial skin         [] Abnormal-            Psychiatric:       [x] Normal Affect [x] No Hallucinations        [] Abnormal-     Other pertinent observable physical exam findings-     ASSESSMENT/PLAN:  Right-sided ulnar neuropathy at the wrist and elbow  Right-sided median nerve neuropathy. Extensive discussion with the patient regarding the clinical symptom presentation that appears to be inconsistent with the EMG findings of only right-sided mild median neuropathy. On clinical assessment patient previously has had provocative maneuvers that have elicited findings of ulnar neuropathy both at the wrist and the elbow as well as median neuropathy. She has thus far been refractory to splinting and unfortunately is developing progressive issues with right upper extremity limiting her ability to work and affecting her quality of life on a daily basis. I did discuss with her complete neural decompression of the ulnar and median nerve on the right side which would involve median nerve release by the carpal tunnel on the right side along with ulnar nerve release at the elbow and the wrist at Guyon's canal.  The procedure will take approximately 3 hours with same-day discharge and she will be in an Ace bandage for approximate 48 hours. I did explain to her that she does have a combination of joint discomfort in the elbow and the wrist in addition to neuropathic symptoms which may worsen immediately after the procedure considering the duration and severity of her clinical presentation. Patient is aware that there is no guarantee that she will distinctly improve. Kindra Moss, was evaluated through a synchronous (real-time) audio-video encounter. The patient (or guardian if applicable) is aware that this is a billable service, which includes applicable co-pays.  This Virtual Visit was conducted with patient's (and/or legal guardian's) consent. The visit was conducted pursuant to the emergency declaration under the [facility], [facility] authority and the [employer] General Act. Patient identification was verified, and a caregiver was present when appropriate. The patient was located at Home: [address] [place] [address] [address] [facility]. Provider was located at [facility] (Appt Dept): [address] [place] [address], [address] [place] [address]. Total time spent on this encounter: Not billed by time    --Malu Ramirez DO on 11/16/2022 at 8:35 AM    An electronic signature was used to authenticate this note.

## 2022-11-17 RX ORDER — SODIUM CHLORIDE, SODIUM LACTATE, POTASSIUM CHLORIDE, CALCIUM CHLORIDE 600; 310; 30; 20 MG/100ML; MG/100ML; MG/100ML; MG/100ML
1000 INJECTION, SOLUTION INTRAVENOUS CONTINUOUS
Status: CANCELLED | OUTPATIENT
Start: 2022-11-17

## 2022-11-17 NOTE — PROGRESS NOTES
Anesthesia Focused Assessment    Hx of anesthesia complications:  no  Family hx of anesthesia complications:  no    METS functional capacity:   Moderate/Excellent: >4 climbing >/= 1 flight of stairs without stopping or walking up hill  >1-2 blocks    Prior + Covid-19 test? 1/2022: Headache, nausea, sinus congestion, achey, fever, chills  X 1 week    Patient vaccinated: yes, had first 2     STOP-BANG Sleep Apnea Questionnaire    SNORE loudly (heard through closed doors)? No  TIRED, fatigued, sleepy during daytime? No  OBSERVED stopping breathing during sleep? No  High blood PRESSURE or being treated? No    BMI over 35? No  AGE over 48? No  NECK circumference over 16\"? No  GENDER (male)? No             Total zero  High risk 5-8  Intermediate risk 3-4  Low risk 0-2    ----------------------------------------------------------------------------------------------------------------------  LATESHA:                                             no  If yes, machine?:                              Type 1 DM:                                   no  T2DM:                                           no    Coronary Artery Disease:             no  Hypertension:                               no  Defib / AICD / Pacemaker:           no    Renal Failure:                               no  If yes, on dialysis? :                           Active smoker:                              no, quit in 2018  Drinks Alcohol:                             social   Illicit drugs:                                    gummies;  THC CBD    Dentition:                                      right upper central  incisor \"cracked\" - has clear filling, others fillings     Past Medical History:   Diagnosis Date    Anxiety     Attention-deficit hyperactivity disorder 06/04/2021    Chronic low back pain     hx bulgin disc, used to see pain mgmt in [place]    COVID-19 01/2022    Headache, nausea, sinus congestion, achey, fever, chills  X1 week. Depression     Endometriosis     Fibromyalgia 02/05/2020    Hx lyrica, no longer, also no longer taking Gabapentin, but still keeps on med list    Gastroesophageal reflux disease without esophagitis 04/25/2021    HPV (human papilloma virus) anogenital infection     Hyperhidrosis     PTSD (post-traumatic stress disorder)     Under care of team 11/18/2022    NEUROLOGY - DR. MOREJON - last visit 11/2022    Under care of team 11/18/2022    NEUROSURGERY - DR. ANDERSON - last visit 10/2022    Wears contact lenses 11/18/2022    Wears glasses 11/18/2022    Wellness examination 11/18/2022    PCP - DR. MALLORY - last viait - 8/2022     Patient was evaluated in PAT & anesthesia guidelines were applied. NPO guidelines, medication instructions and scheduled arrival time were reviewed with patient. I advised patient/patient family to please contact surgeons office, ahead of time if possible, if any new signs or symptoms of illness, infection, rash, etc. Patient/ patient family verbalize understanding.                                                                                                                       Anesthesia contacted:   no    Medical or cardiac clearance ordered: ALEX Chun CNP  Electronically signed 11/18/2022 at 4:54 PM

## 2022-11-17 NOTE — DISCHARGE INSTRUCTIONS
Pre-operative Instructions           NOTHING to eat or drink after midnight the night prior to surgery    (This includes gum, candy, mints, chewing tobacco, etc). Please arrive at the surgery center (Entrance B) by 6:00-6:30 AM on 11/29/2022 (or as directed by your surgeon's office). See Directons to Surgery Center below. Please take only the following medication(s) the day of surgery with a small sip of water: Gabapentin (Neurontin)    Please stop any blood thinning medications  AS DIRECTED BY PRESCRIBING PROVIDER! : Naproxen (Aleve)  Failure to stop these medications as instructed (too soon or too late) may result in injury to you, or your surgery may need to be rescheduled. Below is a list of some examples for your reference. Antiplatelets : (stop blood cells (called platelets) from sticking together and forming a blood clot):   Aspirin (Bufferin, Ecotrin), Clopidogrel (Plavix), Ticagrelor (Brilinta), Prasugrel (Effient), Dipyridamole/aspirin (Aggrenox), Ticlopidine (Ticlid), Eptifibatide (Integrilin)    Anticoagulants: (slow down your body's process of making clots): Warfarin (Coumadin), Rivaroxaban (Xarelto), Dabigatran (Pradaxa), Apixaban (Eliquis), Edoxaban (Savaysa), Heparin/Enoxaparin (Lovenox), Fondaparinux (Arixtra)    NSAIDS: Aspirin (Bufferin, Ecotrin), Ibuprofen (Motrin, Nuprin,Advil), Naproxen (Aleve),Meloxicam (Mobic), Celecoxib (Celebrex), Diclofenac (Voltaren), Etodolac (Lodine), Indomethacin, Ketorolac, Nabumetone, Oxaprozin (Daypro), Piroxicam (Feldene), Excedrin (has aspirin in it)    Herbal supplements: Bromelain, Cinnamon, [employer], [place] (female ginseng), Fish oil, Garlic, Ramila,Ginkgo biloba, Grape seed extract, Turmeric, Vitamin E, etc....)    You may continue the rest of your medications through the night before surgery unless instructed otherwise. If applicable:   Do not take diabetic medications on the day of surgery.   Please use/bring daily inhalers with you 11/18/22  3:23 PM      ___________________  _______________________  Signature (Provider)              Signature (Patient)     Day of Surgery/Procedure    As a patient at [facility] you can expect quality medical and nursing care that is centered on your individual needs. Our goal is to make your surgical experience as comfortable as possible    Directions to the [address] [place] [address] at [address] Adonis is located in the Emergency Room parking lot on [facility] or there is additional parking across the street. The address is [place] [phone]. Please check in at the [facility] upon arrival.     Patient Instructions  In case of any illness please contact your surgeons office for instructions prior to coming to the hospital.    Due to current restrictions you are only allowed 2 adults to be with you. Masks are to be worn and screening will take place on arrival.     Bring your current list of medications, vitamins, herbals and anything you might take on an  \"as needed \" basis. It is important we have a correct list with dosages and frequencies. Please verify your list with your medications at home or bring all of your bottles with you. If you have been given a blood band be sure to bring it with you on the day of surgery. Do not put it on or close the clasp. Use and bring any inhalers if you are currently using one. It is ok to brush your teeth but do not swallow any water. You may be required to provide a urine sample upon your arrival to the pre-op area, so please take this into consideration prior to using the restroom. No jewelry or piercing's to be worn into surgery because you might be injured because of them. No contact lenses to be worn. It is ok to wear your glasses in pre op but they will be removed prior to going into the operating room.     Dentures/partials will likely need to be removed in pre op depending on your type of anesthesia, please do not use adhesives on the day of surgery. Bathe as instructed with the special soap given to you. No lotions, powders or creams after bathing. Wear loose comfortable clothing / shoes that are easy to get on and off over wounds or casts. If you are going to be admitted after surgery please bring your Cpap or BiPap if you have it at home. Keep patient belongings to a minimum and leave valuables at home. If you are staying overnight with us, please bring a SMALL bag of personal items. We cannot accommodate large items, like suitcases. If you are going home after anesthesia or sedation then you must have a responsible adult with you to take you home and to be with you for the first 24 hours after surgery. If you do not have someone to stay with you your surgery might get cancelled. Please contact your surgeon's office to see if other arrangements can be made if you can not find someone to stay with you. If you have any other questions on the day of surgery please contact [phone] or [phone]    If you have any other questions regarding your procedure/surgery please call  your surgeon's office.

## 2022-11-17 NOTE — H&P (VIEW-ONLY)
History and Physical    Pt Name: Nicolas Hoyt  MRN: [id number]  YOB: 1990  Date of evaluation: 11/18/2022  Primary Care Physician: Marlene Garcai MD    SUBJECTIVE:   History of Chief Complaint:    Nicolas Hoyt is a 27 y.o. female who presents for PAT appointment. Patient complains of right guyon syndrome, right cubital canal compression and right carpal tunnel syndrome. She reports initial symptoms started 2 years ago, and that this past spring, it began to worsent. She describes pain to RUE, right hand as  pins, needles, numbness, tingling, intermittent shooting , pressure in the elbow, night time awakenings. 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 She has failed conservative treatments to include braces and wrist splints, OT/PT Patient has been scheduled [facility]   Allergies  is allergic to other, turmeric, codeine, tylenol [acetaminophen], and vicodin [hydrocodone-acetaminophen]. Medications  Prior to Admission medications    Medication Sig Start Date End Date Taking? Authorizing Provider   gabapentin (NEURONTIN) 100 MG capsule Take 1 capsule by mouth 3 times daily for 30 days.  Intended supply: 90 days 8/17/22 11/18/22  Kaitlin Rollo Isabel, DO   acetaminophen (TYLENOL) 500 MG tablet Take 2 tablets by mouth every 8 hours as needed for Pain 8/14/22   Kaitlin Rollo Isabel, DO   naproxen (NAPROSYN) 500 MG tablet Take 1 tablet by mouth 2 times daily as needed for Pain 8/14/22   Kaitlin Rollo Isabel, DO   Elastic Bandages & Supports (WRIST SPLINT/COCK-UP/RIGHT SM) MISC 1 each by Does not apply route nightly as needed (wrist pain) 8/14/22   Kaitlin Rollo Isabel, DO   amitriptyline (ELAVIL) 25 MG tablet Take 1 tablet by mouth nightly 7/27/22   Frederick Rogers MD   rizatriptan (MAXALT) 10 MG tablet Take 1 tablet by mouth once as needed for Migraine May repeat in 2 hours if needed 7/27/22 10/31/22  Chavez Stern MD     Past Medical History    has a past medical history of Anxiety, Attention-deficit hyperactivity disorder, Chronic low back pain, COVID-19, Depression, Endometriosis, Fibromyalgia, Gastroesophageal reflux disease without esophagitis, HPV (human papilloma virus) anogenital infection, Hyperhidrosis, PTSD (post-traumatic stress disorder), Under care of team, Under care of team, Wears contact lenses, Wears glasses, and Wellness examination. Past Surgical History   has a past surgical history that includes Tonsillectomy; [place] tooth extraction;  section (); Colposcopy; and Endometrial biopsy (2020). Social History   reports that she quit smoking about 2 years ago. Her smoking use included cigarettes. She started smoking about 20 years ago. She has a 8.50 pack-year smoking history. She quit smokeless tobacco use about 2 years ago. reports current alcohol use. reports current drug use. Drug: Marijuana Aloma Agrar33). Gummies with CBD and THC, help with migraines   Marital Status    Children one   Occupation [employer], pet care specialist  Family History  Family Status   Relation Name Status    Mother  Alive    Father  Alive    [address]   (Not Specified)    Brother Josiah Alive    MGM  Alive    MGF  Alive    Brother  Alive    PGM  (Not Specified)    PGF       [place]   (Not Specified)    Son  (Not Specified)     family history includes ADHD in her brother, brother, and son; Anxiety Disorder in her mother; COPD in her maternal grandfather; Cancer in her maternal grandfather; Colon Cancer in her paternal grandfather; Depression in her brother, father, and mother; Diabetes type 2  in her maternal aunt; Lung Cancer in her maternal grandfather; No Known Problems in her paternal grandmother; Osteoarthritis in her maternal grandmother; Other in her brother and son; Ovarian Cancer in her maternal aunt.     Review of Systems:  CONSTITUTIONAL:   negative for fevers, chills, fatigue and malaise    EYES:   negative for double vision, blurred vision and photophobia +glasses    HEENT:   negative for tinnitus, epistaxis and sore throat     RESPIRATORY:   negative for cough, shortness of breath, wheezing     CARDIOVASCULAR:   negative for chest pain, palpitations, syncope, edema     GASTROINTESTINAL:   negative for nausea, vomiting     GENITOURINARY:   negative for incontinence     MUSCULOSKELETAL:   +chronic back pain   NEUROLOGICAL:   +see HPI +migraines     PSYCHIATRIC:   +hx anxiety, depression (seeing counseling)     OBJECTIVE:   VITALS:  temporal temperature is 98.2 °F (36.8 °C). Her blood pressure is 144/76 (abnormal) and her pulse is 95. Her respiration is 18. CONSTITUTIONAL:alert & oriented x 3, no acute distress. Friendly. Very pleasant and talkative. SKIN:  Warm and dry, no rashes on exposed areas of skin   HEAD:  Normocephalic, atraumatic   EYES: EOMs intact. PERRL. Wearing glasses. EARS:  Equal bilaterally, no edema or thickening, skin is intact without lumps or lesions. No discharge. Hearing grossly WNL. NOSE:  Nares patent. No rhinorrhea   MOUTH/THROAT:  Mucous membranes moist, tongue is pink, uvula midline, teeth appear to be intact  NECK:full ROM  LUNGS: Respirations even and non-labored. Clear to auscultation bilaterally, no wheezes, rales, or rhonchi. CARDIOVASCULAR: Regular rate and rhythm, no murmurs/rubs/gallops   ABDOMEN: soft, non-tender, non-distended, bowel sounds active x 4   EXTREMITIES: No edema bilateral lower extremities. No varicosities bilateral lower extremities. NEUROLOGIC: CN II-XII are grossly intact. Gait is smooth, rhythmic and effortless.      Testing:   EKG: n/a  Labs pending: drawn 11/18/2022     IMPRESSIONS:   right guyon syndrome, right cubital canal compression and right carpal tunnel syndrome  PLANS:   GUYONS CUBITAL AND CARPAL TUNNEL RELEASE- RIGHT    ALEX KENYON - CNP  Electronically signed 11/18/2022 at 4:54 PM

## 2022-11-17 NOTE — H&P
History and Physical    Pt Name: Mary Álvarez  MRN: [id number]  YOB: 1990  Date of evaluation: 11/18/2022  Primary Care Physician: Jonny Caputo MD    SUBJECTIVE:   History of Chief Complaint:    Mary Álvarez is a 27 y.o. female who presents for PAT appointment. Patient complains of right guyon syndrome, right cubital canal compression and right carpal tunnel syndrome. She reports initial symptoms started 2 years ago, and that this past spring, it began to worsent. She describes pain to RUE, right hand as  pins, needles, numbness, tingling, intermittent shooting , pressure in the elbow, night time awakenings. 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 She has failed conservative treatments to include braces and wrist splints, OT/PT Patient has been scheduled for [facility]   Allergies  is allergic to other, turmeric, codeine, tylenol [acetaminophen], and vicodin [hydrocodone-acetaminophen]. Medications  Prior to Admission medications    Medication Sig Start Date End Date Taking? Authorizing Provider   gabapentin (NEURONTIN) 100 MG capsule Take 1 capsule by mouth 3 times daily for 30 days.  Intended supply: 90 days 8/17/22 11/18/22  Kaitlinmadie Katzin Bath, DO   acetaminophen (TYLENOL) 500 MG tablet Take 2 tablets by mouth every 8 hours as needed for Pain 8/14/22   Kaitlin Ashraf Bath, DO   naproxen (NAPROSYN) 500 MG tablet Take 1 tablet by mouth 2 times daily as needed for Pain 8/14/22   Kaitlin Ashraf Bath, DO   Elastic Bandages & Supports (WRIST SPLINT/COCK-UP/RIGHT SM) MISC 1 each by Does not apply route nightly as needed (wrist pain) 8/14/22   Kaitlin Andriy Bath, DO   amitriptyline (ELAVIL) 25 MG tablet Take 1 tablet by mouth nightly 7/27/22   Heather Cherry MD   rizatriptan (MAXALT) 10 MG tablet Take 1 tablet by mouth once as needed for Migraine May repeat in 2 hours if needed 7/27/22 10/31/22  Manuel Lara MD     Past Medical History    has a past medical history of Anxiety, Attention-deficit hyperactivity disorder, Chronic low back pain, COVID-19, Depression, Endometriosis, Fibromyalgia, Gastroesophageal reflux disease without esophagitis, HPV (human papilloma virus) anogenital infection, Hyperhidrosis, PTSD (post-traumatic stress disorder), Under care of team, Under care of team, Wears contact lenses, Wears glasses, and Wellness examination. Past Surgical History   has a past surgical history that includes Tonsillectomy; [place] tooth extraction;  section (); Colposcopy; and Endometrial biopsy (2020). Social History   reports that she quit smoking about 2 years ago. Her smoking use included cigarettes. She started smoking about 20 years ago. She has a 8.50 pack-year smoking history. She quit smokeless tobacco use about 2 years ago. reports current alcohol use. reports current drug use. Drug: Marijuana [place]). Gummies with CBD and THC, help with migraines   Marital Status    Children one   Occupation [employer], pet care specialist  Family History  Family Status   Relation Name Status    Mother  Alive    Father  Alive    [address] [place]   (Not Specified)    Brother Josiah Alive    MGM  Alive    MGF  Alive    Brother  Alive    PGM  (Not Specified)    PGF       [place]   (Not Specified)    Son  (Not Specified)     family history includes ADHD in her brother, brother, and son; Anxiety Disorder in her mother; COPD in her maternal grandfather; Cancer in her maternal grandfather; Colon Cancer in her paternal grandfather; Depression in her brother, father, and mother; Diabetes type 2  in her maternal aunt; Lung Cancer in her maternal grandfather; No Known Problems in her paternal grandmother; Osteoarthritis in her maternal grandmother; Other in her brother and son; Ovarian Cancer in her maternal aunt.     Review of Systems:  CONSTITUTIONAL:   negative for fevers, chills, fatigue and malaise    EYES:   negative for double vision, blurred vision and photophobia +glasses    HEENT:   negative for tinnitus, epistaxis and sore throat     RESPIRATORY:   negative for cough, shortness of breath, wheezing     CARDIOVASCULAR:   negative for chest pain, palpitations, syncope, edema     GASTROINTESTINAL:   negative for nausea, vomiting     GENITOURINARY:   negative for incontinence     MUSCULOSKELETAL:   +chronic back pain   NEUROLOGICAL:   +see HPI +migraines     PSYCHIATRIC:   +hx anxiety, depression (seeing counseling)     OBJECTIVE:   VITALS:  temporal temperature is 98.2 °F (36.8 °C). Her blood pressure is 144/76 (abnormal) and her pulse is 95. Her respiration is 18. CONSTITUTIONAL:alert & oriented x 3, no acute distress. Friendly. Very pleasant and talkative. SKIN:  Warm and dry, no rashes on exposed areas of skin   HEAD:  Normocephalic, atraumatic   EYES: EOMs intact. PERRL. Wearing glasses. EARS:  Equal bilaterally, no edema or thickening, skin is intact without lumps or lesions. No discharge. Hearing grossly WNL. NOSE:  Nares patent. No rhinorrhea   MOUTH/THROAT:  Mucous membranes moist, tongue is pink, uvula midline, teeth appear to be intact  NECK:full ROM  LUNGS: Respirations even and non-labored. Clear to auscultation bilaterally, no wheezes, rales, or rhonchi. CARDIOVASCULAR: Regular rate and rhythm, no murmurs/rubs/gallops   ABDOMEN: soft, non-tender, non-distended, bowel sounds active x 4   EXTREMITIES: No edema bilateral lower extremities. No varicosities bilateral lower extremities. NEUROLOGIC: CN II-XII are grossly intact. Gait is smooth, rhythmic and effortless.      Testing:   EKG: n/a  Labs pending: drawn 11/18/2022     IMPRESSIONS:   right guyon syndrome, right cubital canal compression and right carpal tunnel syndrome  PLANS:   GUYONS CUBITAL AND CARPAL TUNNEL RELEASE- RIGHT    ALEX KENYON - CNP  Electronically signed 11/18/2022 at 4:54 PM

## 2022-11-18 ENCOUNTER — HOSPITAL ENCOUNTER (OUTPATIENT)
Dept: PREADMISSION TESTING | Age: 32
Discharge: HOME | End: 2022-11-22
Payer: OTHER GOVERNMENT

## 2022-11-18 VITALS
HEART RATE: 95 BPM | DIASTOLIC BLOOD PRESSURE: 76 MMHG | TEMPERATURE: 98.2 F | SYSTOLIC BLOOD PRESSURE: 144 MMHG | RESPIRATION RATE: 18 BRPM

## 2022-11-18 LAB
ABO/RH: NORMAL
ANTIBODY SCREEN: NEGATIVE
ARM BAND NUMBER: NORMAL
BUN BLDV-MCNC: 18 MG/DL (ref 6–20)
CREAT SERPL-MCNC: 0.86 MG/DL (ref 0.5–0.9)
EXPIRATION DATE: NORMAL
GFR SERPL CREATININE-BSD FRML MDRD: >60 ML/MIN/1.73M2
GLUCOSE BLD-MCNC: 81 MG/DL (ref 70–99)
HCT VFR BLD CALC: 42 % (ref 36.3–47.1)
HEMOGLOBIN: 14.2 G/DL (ref 11.9–15.1)
INR BLD: 1
MCH RBC QN AUTO: 32.1 PG (ref 25.2–33.5)
MCHC RBC AUTO-ENTMCNC: 33.8 G/DL (ref 28.4–34.8)
MCV RBC AUTO: 95 FL (ref 82.6–102.9)
NRBC AUTOMATED: 0 PER 100 WBC
PARTIAL THROMBOPLASTIN TIME: 24.3 SEC (ref 20.5–30.5)
PDW BLD-RTO: 11.9 % (ref 11.8–14.4)
PLATELET # BLD: 213 K/UL (ref 138–453)
PMV BLD AUTO: 11.1 FL (ref 8.1–13.5)
PROTHROMBIN TIME: 10.5 SEC (ref 9.1–12.3)
RBC # BLD: 4.42 M/UL (ref 3.95–5.11)
WBC # BLD: 9.8 K/UL (ref 3.5–11.3)

## 2022-11-18 PROCEDURE — 85027 COMPLETE CBC AUTOMATED: CPT

## 2022-11-18 PROCEDURE — 86850 RBC ANTIBODY SCREEN: CPT

## 2022-11-18 PROCEDURE — 86900 BLOOD TYPING SEROLOGIC ABO: CPT

## 2022-11-18 PROCEDURE — 36415 COLL VENOUS BLD VENIPUNCTURE: CPT

## 2022-11-18 PROCEDURE — 86901 BLOOD TYPING SEROLOGIC RH(D): CPT

## 2022-11-18 PROCEDURE — 85730 THROMBOPLASTIN TIME PARTIAL: CPT

## 2022-11-18 PROCEDURE — 82947 ASSAY GLUCOSE BLOOD QUANT: CPT

## 2022-11-18 PROCEDURE — 82565 ASSAY OF CREATININE: CPT

## 2022-11-18 PROCEDURE — 85610 PROTHROMBIN TIME: CPT

## 2022-11-18 PROCEDURE — 84520 ASSAY OF UREA NITROGEN: CPT

## 2022-11-18 ASSESSMENT — PAIN SCALES - GENERAL: PAINLEVEL_OUTOF10: 5

## 2022-11-18 ASSESSMENT — PAIN DESCRIPTION - FREQUENCY: FREQUENCY: CONTINUOUS

## 2022-11-18 ASSESSMENT — PAIN DESCRIPTION - PAIN TYPE: TYPE: CHRONIC PAIN

## 2022-11-18 ASSESSMENT — PAIN DESCRIPTION - LOCATION: LOCATION: ARM

## 2022-11-18 ASSESSMENT — PAIN DESCRIPTION - ORIENTATION: ORIENTATION: RIGHT

## 2022-11-18 ASSESSMENT — PAIN DESCRIPTION - DESCRIPTORS: DESCRIPTORS: ACHING

## 2022-11-22 ENCOUNTER — TELEPHONE (OUTPATIENT)
Dept: FAMILY MEDICINE CLINIC | Age: 32
End: 2022-11-22

## 2022-11-22 DIAGNOSIS — G56.00 CARPAL TUNNEL SYNDROME, UNSPECIFIED LATERALITY: Primary | ICD-10-CM

## 2022-11-22 NOTE — TELEPHONE ENCOUNTER
Denysa canal surgery   Carpal tunnel   Sulaiman canal     He is a Neurosurgeon and she is having him do all 3 of the surgeries  on Nov 29th     Please advise

## 2022-11-22 NOTE — TELEPHONE ENCOUNTER
Patient called in stating that she needs to have a refrral sent to DR [facility]ERNÁNDE[facility] office and her insurance company.     She stated she is due to have surgery on November 29,2022    Please advise

## 2022-11-23 ENCOUNTER — HOSPITAL ENCOUNTER (OUTPATIENT)
Dept: OCCUPATIONAL THERAPY | Facility: CLINIC | Age: 32
Setting detail: THERAPIES SERIES
Discharge: HOME | End: 2022-11-23
Payer: OTHER GOVERNMENT

## 2022-11-23 PROCEDURE — 97110 THERAPEUTIC EXERCISES: CPT

## 2022-11-23 NOTE — FLOWSHEET NOTE
[] [facility]       Occupational Therapy            1st floor       [address], [place]         Phone: [phone]       Fax: [phone] [x] [facility]  [address], [place]  Phone: [phone]  Fax: [phone]      Occupational Therapy Daily Treatment Note    Date:  2022  Patient Name:  Stephanie House    :  1990  MRN: [id number]  Referring Provider:  ALEX Ruelas CNP  Insurance: Other Deborah Ward approved for 19 visits)  Medical Diagnosis: Right arm pain (M79.601)           Rehab Codes: pain in elbow M25.52,, pain in hand M79.646,, pain in wrist M25.53,, numbness R20.2,, pain in right forearm M79.631,, or pain in right hand M79.641,  Onset Date: 22                 Next  [address]: 22  Visit# / total visits: ; Progress note for Medicare patient due at visit 8-9    Cancels/No Shows: 0/0      Subjective:    Pain:  [] Yes  [x] No Location:  N/A  Pain Rating: (0-10 scale) 7/10  Pain altered Tx:  [x] No  [] Yes  Action:   Pt Comments: Randal CESPEDES noted dated 22 for details     Objective:  Modalities:  Precautions: n/a  Exercises:  Exercise Flow Sheet:  Exercise Reps/Time Weight/Level Comments   Putty 2 minutes each tan Completed/HEP  Composite , tip to tip pinch, 3 jaw sharon, lateral pinch, rolling   Tendon gliding 10 AROM Completed/HEP   Ulnar nerve glide 10   Completed/HEP  Prayer glide  Palm to face   Flexbar  Wrist Flexion/Ext  Wrist pronation  Wrist supination  Wrist ulnar deviation  Wrist radial dev   1x15 Yellow  Completed    Resistive pronation/supination  1x15 2lbs Completed   Gripping  1x15   Digiflex (red)  Metal gripper (35lbs)   Serratus anterior slides 2x10 AROM Completed   Shoulder extension  2x10 yellow Completed   rows 2x10 yellow Completed   Resistive pinching  red Half of bucket of sponges       Treatment Charges: Mins Units   []  Modalities:     []  Ultrasound     [x]  Ther Exercise 30 2   []  Manual Therapy     []  Ther Activities     []  Orthotic fit/train     []  Orthotic recheck     []  Other     Total Treatment time 30 2       Assessment: [x] Progressing toward goals. Formal measurements taken on this date for DC note. Reviewed all HEP exercises w/ pt. Discussed pt continuing to complete HEP up till her sx date as tolerated. Pt reported understanding. Pt tolerated all HEP exercises well on this date    [] No change. [] Other     [x] Patient would continue to benefit from skilled occupational therapy services in order to: Improve, Increase, and Maximize  functional /grasp, Strength, Activity tolerance, and Complaint of pain in order to Ensure safe return to work, improve functional use of UE in ADL performance, decrease pain in UE for safe completion of ADLs, and return to PLOF      STG/LTG    Short Term Goals: (  8    Treatments)  Decrease Pain: By 3 point on numeric pain scale Not met  Increase strength (pounds); of /pinch strength to >/= non dominate L UE Not met  Increase function:UE Functional Index Score 10 or more points to promote increased functional abilities Not met  Pt will report ability to complete tasks at work using R UE Met  Pt will reported decreased pain in R UE during HEP Met  Pt will report reduction in OTC NSAIDs to control pain throughout the day Not met  Patient to be independent with home exercise program as demonstrated by performance with correct form without cues. Met     Long Term Goals: (  15  Treatments)  Decrease Pain: By 5 point on numeric pain scale Not met  Increase strength (pounds); of /pinch strength to >/= non dominate L UE Not met  Increase function:UE Functional Index Score 15 or more points to promote increased functional abilities Not met  Pt will report ability to complete tasks at work using R UE w/out compensating w/ L UE. Not met       Pt.  Education:  [x] Yes  [] No  [x] Reviewed Prior HEP/Ed  Method of Education: [] Verbal  [] Demo  [] Written  Re:   Comprehension of Education:  [x] Verbalizes understanding. [x] Demonstrates understanding. [] Needs review. [] Demonstrates/verbalizes HEP/Ed previously given. Plan: [x] Continue current frequency toward short and long term goals.   [x] Specific Instructions for subsequent treatments: POC to DC pt at next appointment prior to sx.    [] Other:       Time In: 1300  Time Out: 1330        Electronically signed by:  BIPIN Castellanos/IZZY

## 2022-11-23 NOTE — DISCHARGE SUMMARY
[] [facility]       Occupational Therapy             1st floor       [address], [place]         Phone: [phone]       Fax: [phone] [x] [facility]  [address] [place] [address]  Phone: [phone]  Fax: [phone]         Occupational Therapy Discharge Note    Date: 2022      Patient: Kindra Moss  : 1990  MRN: [id number]    Referring Provider:  ALEX Fernandez CNP  Insurance: Other Deborah Alatorre approved for 19 visits)  Medical Diagnosis: Right arm pain (M79.601)           Rehab Codes: pain in elbow M25.52,, pain in hand M79.646,, pain in wrist M25.53,, numbness R20.2,, pain in right forearm M79.631,, or pain in right hand M79.641,  Onset Date: 22                 Next  [address]: 22  Visit# / total visits: ; Progress note for Medicare patient due at visit 8-9                       Cancels/No Shows: 0/0  Total visits attended: 8  Cancels/No shows: 0/0  Date of initial visit: 10/5/22                Date of final visit: 22      Subjective:  Pain:  [x] Yes  [] No  Location: R elbow/hand Pain Rating: (0-10 scale) 7/10  Pain altered Tx:  [x] No  [] Yes  Action:  Comments: pt reports she awoke this morning to numbness in R elbow and hand and pn in wrist.     Objective:  Tests/Measurements: Upper Extremity Functional Index  Current Functional Level:  25/80 functionally impaired as measured with the Upper Extremity Functional Index Survey. 0-80 scale, with 80 = no Deficits  (The UEFI model does not provide any specific cut off points that could classify the upper limb disability degree, however, a minimal detectable change of 9 points is provided. This means that for improvement or deterioration to be considered, between two subsequent evaluations, the scores must differ by at least 9 points.)    Function:           Special tests  Test Right  Left   Tinel's  - [place]  - Medial epicondyle  - Cubital retinaculum  - [place] of [place]  - 2 heads of FCU  - Guyon's canal  - carpal tunnel    P  N  P  P  P  P  P    N  N  N  N  N  N  N   Ligamentous        Valgus stress N N   Varus stress N N   Moving Valgus Stress Test NT NT   Lateral epicondylagia        Maudsley's test NT NT   Mill's test NT NT   Cozen's test NT NT   Medial Epicondylalgia       Golfer's elbow test NT NT         UE ROM/MMT    Right Left MMT Right MMT Left   Shoulder           Flexion [facility] -4/5 5/5   Extension [facility] WFL -4/5 5/5   Adduction [facility] WFL -4/5 5/5   Abduction [facility] WFL -4/5 5/5   Internal Rotation [facility] -4/5 5/5   External Rotation [facility] W[facility] -4/5 5/5   Elbow           Flexion [facility] WFL -4/5 5/5   Extension [facility] WFL -4/5 5/5   Pronation [facility] WFL -4/5 5/5   Supination  [facility] WFL -4/5 5/5   Wrist           Flexion [facility] WFL 5/5 5/5   Extension [facility] WFL 5/5 5/5   Radial deviation [facility] WFL +4/5 5/5   Ulnar deviation [facility] WFL +4/5 5/5         COORDINATION  - Fine Motor (speed/dexterity)       Right in seconds Percentile Left in seconds Percentile   9 Hole Peg Test 22   23           STRENGTH                   Right   (pounds) Left   (pounds)    position 1 41 (93%) 44    position 2 40 (90%) 44   Lateral pinch 15 (88%) 17   2 point pinch 7 (70%) 10   3 jaw pinch 12 12      Bilateral  strength is normally symmetrical or up to 10% stronger on the dominant extremity, depending on the individual's physically activity level.      Assessment:  Short Term Goals: (  8    Treatments)  Decrease Pain: By 3 point on numeric pain scale Not met  Increase strength (pounds); of /pinch strength to >/= non dominate L UE Not met  Increase function:UE Functional Index Score 10 or more points to promote increased functional abilities Not met  Pt will report ability to complete tasks at work using R UE Met  Pt will reported decreased pain in R UE during HEP Met  Pt will report reduction in OTC NSAIDs to control pain throughout the day Not met  Patient to be independent with home exercise program as demonstrated by performance with correct form without cues. Met     Long Term Goals: (  15  Treatments)  Decrease Pain: By 5 point on numeric pain scale Not met  Increase strength (pounds); of /pinch strength to >/= non dominate L UE Not met  Increase function:UE Functional Index Score 15 or more points to promote increased functional abilities Not met  Pt will report ability to complete tasks at work using R UE w/out compensating w/ L UE. Not met       Treatment to Date:     [x]  Therapeutic Exercise   77459              []  Iontophoresis: 4 mg/mL Dexamethasone Sodium Phosphate  mAmin  80799    [x]  Therapeutic Activity  10986  []  Vasopneumatic cold with compression  40339                []  ADL training  26013  []  Ultrasound        91544    []  Neuromuscular Re-education  97066  []  Electrical Stimulation Attended  65154    [x]  Manual Therapy  13422  Orthotic    []  Fit  21915     []  Train 77643    []  Instruction in HEP        Prosthetic    []  Fit 60653      []  Train [id number]    []  Cognitive Interventions, (first 15 min 63931, subsequent 15 min 96600)  []  Cold/hotpack      [x]  Massage   02449             Discharge Status:     [] Pt recovered from conditions. Treatment goals were met. [] Pt received maximum benefit. No further therapy indicated at this time. [] Pt to continue exercise/home instructions independently. [x] Therapy interrupted due to: pt to have cubital tunnel, carpal tunnel, and guyon canal release on 11/29/22    [] Pt has 2 or more no shows/cancels, is discontinued per our policy. [] Pt has completed prescribed number of treatment sessions. [] Other:         Electronically signed by: Brandon Mcelroy OTR/L    If you have any questions or concerns, please don't hesitate to call.   Thank you for your referral.

## 2022-11-28 ENCOUNTER — ANESTHESIA EVENT (OUTPATIENT)
Dept: OPERATING ROOM | Age: 32
End: 2022-11-28
Payer: OTHER GOVERNMENT

## 2022-11-29 ENCOUNTER — ANESTHESIA (OUTPATIENT)
Dept: OPERATING ROOM | Age: 32
End: 2022-11-29
Payer: OTHER GOVERNMENT

## 2022-11-29 ENCOUNTER — TELEPHONE (OUTPATIENT)
Dept: NEUROSURGERY | Age: 32
End: 2022-11-29

## 2022-11-29 ENCOUNTER — HOSPITAL ENCOUNTER (OUTPATIENT)
Age: 32
Setting detail: OUTPATIENT SURGERY
Discharge: HOME | End: 2022-11-29
Attending: NEUROLOGICAL SURGERY | Admitting: NEUROLOGICAL SURGERY
Payer: OTHER GOVERNMENT

## 2022-11-29 VITALS
DIASTOLIC BLOOD PRESSURE: 75 MMHG | OXYGEN SATURATION: 98 % | WEIGHT: 145 LBS | HEART RATE: 78 BPM | RESPIRATION RATE: 14 BRPM | HEIGHT: 62 IN | TEMPERATURE: 97.1 F | SYSTOLIC BLOOD PRESSURE: 112 MMHG | BODY MASS INDEX: 26.68 KG/M2

## 2022-11-29 DIAGNOSIS — G56.21 CUBITAL TUNNEL SYNDROME, RIGHT: Primary | ICD-10-CM

## 2022-11-29 DIAGNOSIS — G56.01 CARPAL TUNNEL SYNDROME OF RIGHT WRIST: ICD-10-CM

## 2022-11-29 LAB — HCG, PREGNANCY URINE (POC): NEGATIVE

## 2022-11-29 PROCEDURE — 6370000000 HC RX 637 (ALT 250 FOR IP): Performed by: NEUROLOGICAL SURGERY

## 2022-11-29 PROCEDURE — 6360000002 HC RX W HCPCS: Performed by: ANESTHESIOLOGY

## 2022-11-29 PROCEDURE — 3700000000 HC ANESTHESIA ATTENDED CARE: Performed by: NEUROLOGICAL SURGERY

## 2022-11-29 PROCEDURE — 2500000003 HC RX 250 WO HCPCS: Performed by: ANESTHESIOLOGY

## 2022-11-29 PROCEDURE — 7100000000 HC PACU RECOVERY - FIRST 15 MIN: Performed by: NEUROLOGICAL SURGERY

## 2022-11-29 PROCEDURE — 64718 REVISE ULNAR NERVE AT ELBOW: CPT | Performed by: NEUROLOGICAL SURGERY

## 2022-11-29 PROCEDURE — 2500000003 HC RX 250 WO HCPCS: Performed by: NEUROLOGICAL SURGERY

## 2022-11-29 PROCEDURE — 64721 CARPAL TUNNEL SURGERY: CPT | Performed by: NEUROLOGICAL SURGERY

## 2022-11-29 PROCEDURE — 7100000001 HC PACU RECOVERY - ADDTL 15 MIN: Performed by: NEUROLOGICAL SURGERY

## 2022-11-29 PROCEDURE — 2580000003 HC RX 258: Performed by: ANESTHESIOLOGY

## 2022-11-29 PROCEDURE — 2500000003 HC RX 250 WO HCPCS: Performed by: NURSE ANESTHETIST, CERTIFIED REGISTERED

## 2022-11-29 PROCEDURE — 3700000001 HC ADD 15 MINUTES (ANESTHESIA): Performed by: NEUROLOGICAL SURGERY

## 2022-11-29 PROCEDURE — 64719 REVISE ULNAR NERVE AT WRIST: CPT | Performed by: NEUROLOGICAL SURGERY

## 2022-11-29 PROCEDURE — 2709999900 HC NON-CHARGEABLE SUPPLY: Performed by: NEUROLOGICAL SURGERY

## 2022-11-29 PROCEDURE — 3600000004 HC SURGERY LEVEL 4 BASE: Performed by: NEUROLOGICAL SURGERY

## 2022-11-29 PROCEDURE — 81025 URINE PREGNANCY TEST: CPT

## 2022-11-29 PROCEDURE — 6360000002 HC RX W HCPCS: Performed by: NURSE ANESTHETIST, CERTIFIED REGISTERED

## 2022-11-29 PROCEDURE — 2580000003 HC RX 258: Performed by: NEUROLOGICAL SURGERY

## 2022-11-29 PROCEDURE — 3600000014 HC SURGERY LEVEL 4 ADDTL 15MIN: Performed by: NEUROLOGICAL SURGERY

## 2022-11-29 PROCEDURE — 7100000010 HC PHASE II RECOVERY - FIRST 15 MIN: Performed by: NEUROLOGICAL SURGERY

## 2022-11-29 PROCEDURE — 7100000011 HC PHASE II RECOVERY - ADDTL 15 MIN: Performed by: NEUROLOGICAL SURGERY

## 2022-11-29 RX ORDER — SODIUM CHLORIDE 0.9 % (FLUSH) 0.9 %
5-40 SYRINGE (ML) INJECTION EVERY 12 HOURS SCHEDULED
Status: DISCONTINUED | OUTPATIENT
Start: 2022-11-29 | End: 2022-11-29 | Stop reason: HOSPADM

## 2022-11-29 RX ORDER — GLYCOPYRROLATE 0.2 MG/ML
INJECTION INTRAMUSCULAR; INTRAVENOUS PRN
Status: DISCONTINUED | OUTPATIENT
Start: 2022-11-29 | End: 2022-11-29 | Stop reason: SDUPTHER

## 2022-11-29 RX ORDER — SODIUM CHLORIDE 9 MG/ML
INJECTION, SOLUTION INTRAVENOUS PRN
Status: DISCONTINUED | OUTPATIENT
Start: 2022-11-29 | End: 2022-11-29 | Stop reason: HOSPADM

## 2022-11-29 RX ORDER — FENTANYL CITRATE 50 UG/ML
INJECTION, SOLUTION INTRAMUSCULAR; INTRAVENOUS PRN
Status: DISCONTINUED | OUTPATIENT
Start: 2022-11-29 | End: 2022-11-29 | Stop reason: SDUPTHER

## 2022-11-29 RX ORDER — OXYCODONE HYDROCHLORIDE AND ACETAMINOPHEN 5; 325 MG/1; MG/1
TABLET ORAL
Qty: 56 TABLET | Refills: 0 | Status: SHIPPED | OUTPATIENT
Start: 2022-11-29 | End: 2022-12-06

## 2022-11-29 RX ORDER — SODIUM CHLORIDE 0.9 % (FLUSH) 0.9 %
5-40 SYRINGE (ML) INJECTION PRN
Status: DISCONTINUED | OUTPATIENT
Start: 2022-11-29 | End: 2022-11-29 | Stop reason: HOSPADM

## 2022-11-29 RX ORDER — SODIUM CHLORIDE, SODIUM LACTATE, POTASSIUM CHLORIDE, CALCIUM CHLORIDE 600; 310; 30; 20 MG/100ML; MG/100ML; MG/100ML; MG/100ML
1000 INJECTION, SOLUTION INTRAVENOUS CONTINUOUS
Status: DISCONTINUED | OUTPATIENT
Start: 2022-11-29 | End: 2022-11-29 | Stop reason: HOSPADM

## 2022-11-29 RX ORDER — LIDOCAINE HYDROCHLORIDE 10 MG/ML
INJECTION, SOLUTION EPIDURAL; INFILTRATION; INTRACAUDAL; PERINEURAL PRN
Status: DISCONTINUED | OUTPATIENT
Start: 2022-11-29 | End: 2022-11-29 | Stop reason: SDUPTHER

## 2022-11-29 RX ORDER — MIDAZOLAM HYDROCHLORIDE 1 MG/ML
INJECTION INTRAMUSCULAR; INTRAVENOUS PRN
Status: DISCONTINUED | OUTPATIENT
Start: 2022-11-29 | End: 2022-11-29 | Stop reason: SDUPTHER

## 2022-11-29 RX ORDER — HYDRALAZINE HYDROCHLORIDE 20 MG/ML
10 INJECTION INTRAMUSCULAR; INTRAVENOUS
Status: DISCONTINUED | OUTPATIENT
Start: 2022-11-29 | End: 2022-11-29 | Stop reason: HOSPADM

## 2022-11-29 RX ORDER — DEXAMETHASONE SODIUM PHOSPHATE 10 MG/ML
INJECTION INTRAMUSCULAR; INTRAVENOUS PRN
Status: DISCONTINUED | OUTPATIENT
Start: 2022-11-29 | End: 2022-11-29 | Stop reason: SDUPTHER

## 2022-11-29 RX ORDER — CEPHALEXIN 500 MG/1
500 CAPSULE ORAL 4 TIMES DAILY
Qty: 4 CAPSULE | Refills: 0 | Status: SHIPPED | OUTPATIENT
Start: 2022-11-29 | End: 2022-11-30

## 2022-11-29 RX ORDER — LIDOCAINE HYDROCHLORIDE 10 MG/ML
INJECTION, SOLUTION INFILTRATION; PERINEURAL PRN
Status: DISCONTINUED | OUTPATIENT
Start: 2022-11-29 | End: 2022-11-29 | Stop reason: ALTCHOICE

## 2022-11-29 RX ORDER — LABETALOL HYDROCHLORIDE 5 MG/ML
10 INJECTION, SOLUTION INTRAVENOUS
Status: DISCONTINUED | OUTPATIENT
Start: 2022-11-29 | End: 2022-11-29 | Stop reason: HOSPADM

## 2022-11-29 RX ORDER — MAGNESIUM HYDROXIDE 1200 MG/15ML
LIQUID ORAL CONTINUOUS PRN
Status: COMPLETED | OUTPATIENT
Start: 2022-11-29 | End: 2022-11-29

## 2022-11-29 RX ORDER — ONDANSETRON 2 MG/ML
INJECTION INTRAMUSCULAR; INTRAVENOUS PRN
Status: DISCONTINUED | OUTPATIENT
Start: 2022-11-29 | End: 2022-11-29 | Stop reason: SDUPTHER

## 2022-11-29 RX ORDER — PROPOFOL 10 MG/ML
INJECTION, EMULSION INTRAVENOUS PRN
Status: DISCONTINUED | OUTPATIENT
Start: 2022-11-29 | End: 2022-11-29 | Stop reason: SDUPTHER

## 2022-11-29 RX ORDER — NEOSTIGMINE METHYLSULFATE 5 MG/5 ML
SYRINGE (ML) INTRAVENOUS PRN
Status: DISCONTINUED | OUTPATIENT
Start: 2022-11-29 | End: 2022-11-29 | Stop reason: SDUPTHER

## 2022-11-29 RX ORDER — PHENYLEPHRINE HCL IN 0.9% NACL 1 MG/10 ML
SYRINGE (ML) INTRAVENOUS PRN
Status: DISCONTINUED | OUTPATIENT
Start: 2022-11-29 | End: 2022-11-29 | Stop reason: SDUPTHER

## 2022-11-29 RX ORDER — GINSENG 100 MG
CAPSULE ORAL PRN
Status: DISCONTINUED | OUTPATIENT
Start: 2022-11-29 | End: 2022-11-29 | Stop reason: ALTCHOICE

## 2022-11-29 RX ORDER — ONDANSETRON 4 MG/1
4 TABLET, ORALLY DISINTEGRATING ORAL EVERY 8 HOURS PRN
Qty: 21 TABLET | Refills: 0 | Status: SHIPPED | OUTPATIENT
Start: 2022-11-29

## 2022-11-29 RX ORDER — ONDANSETRON 2 MG/ML
4 INJECTION INTRAMUSCULAR; INTRAVENOUS
Status: DISCONTINUED | OUTPATIENT
Start: 2022-11-29 | End: 2022-11-29 | Stop reason: HOSPADM

## 2022-11-29 RX ORDER — ROCURONIUM BROMIDE 10 MG/ML
INJECTION, SOLUTION INTRAVENOUS PRN
Status: DISCONTINUED | OUTPATIENT
Start: 2022-11-29 | End: 2022-11-29 | Stop reason: SDUPTHER

## 2022-11-29 RX ADMIN — ROCURONIUM BROMIDE 5 MG: 10 INJECTION, SOLUTION INTRAVENOUS at 09:30

## 2022-11-29 RX ADMIN — LIDOCAINE HYDROCHLORIDE 40 MG: 10 INJECTION, SOLUTION EPIDURAL; INFILTRATION; INTRACAUDAL; PERINEURAL at 07:25

## 2022-11-29 RX ADMIN — PROPOFOL 30 MG: 10 INJECTION, EMULSION INTRAVENOUS at 07:31

## 2022-11-29 RX ADMIN — HYDROMORPHONE HYDROCHLORIDE 0.25 MG: 1 INJECTION, SOLUTION INTRAMUSCULAR; INTRAVENOUS; SUBCUTANEOUS at 10:49

## 2022-11-29 RX ADMIN — HYDROMORPHONE HYDROCHLORIDE 0.5 MG: 1 INJECTION, SOLUTION INTRAMUSCULAR; INTRAVENOUS; SUBCUTANEOUS at 10:35

## 2022-11-29 RX ADMIN — Medication 50 MCG: at 08:09

## 2022-11-29 RX ADMIN — FENTANYL CITRATE 100 MCG: 50 INJECTION, SOLUTION INTRAMUSCULAR; INTRAVENOUS at 07:25

## 2022-11-29 RX ADMIN — Medication 50 MCG: at 07:40

## 2022-11-29 RX ADMIN — Medication 100 MCG: at 08:29

## 2022-11-29 RX ADMIN — HYDROMORPHONE HYDROCHLORIDE 0.5 MG: 1 INJECTION, SOLUTION INTRAMUSCULAR; INTRAVENOUS; SUBCUTANEOUS at 10:28

## 2022-11-29 RX ADMIN — Medication 100 MCG: at 08:21

## 2022-11-29 RX ADMIN — PROPOFOL 30 MG: 10 INJECTION, EMULSION INTRAVENOUS at 08:03

## 2022-11-29 RX ADMIN — Medication 3 MG: at 10:11

## 2022-11-29 RX ADMIN — ROCURONIUM BROMIDE 50 MG: 10 INJECTION, SOLUTION INTRAVENOUS at 07:27

## 2022-11-29 RX ADMIN — SODIUM CHLORIDE, POTASSIUM CHLORIDE, SODIUM LACTATE AND CALCIUM CHLORIDE 1000 ML: 600; 310; 30; 20 INJECTION, SOLUTION INTRAVENOUS at 07:07

## 2022-11-29 RX ADMIN — FENTANYL CITRATE 25 MCG: 50 INJECTION, SOLUTION INTRAMUSCULAR; INTRAVENOUS at 08:04

## 2022-11-29 RX ADMIN — PROPOFOL 30 MG: 10 INJECTION, EMULSION INTRAVENOUS at 09:12

## 2022-11-29 RX ADMIN — FENTANYL CITRATE 25 MCG: 50 INJECTION, SOLUTION INTRAMUSCULAR; INTRAVENOUS at 09:47

## 2022-11-29 RX ADMIN — HYDROMORPHONE HYDROCHLORIDE 0.25 MG: 1 INJECTION, SOLUTION INTRAMUSCULAR; INTRAVENOUS; SUBCUTANEOUS at 11:18

## 2022-11-29 RX ADMIN — FENTANYL CITRATE 25 MCG: 50 INJECTION, SOLUTION INTRAMUSCULAR; INTRAVENOUS at 10:17

## 2022-11-29 RX ADMIN — Medication 100 MCG: at 09:24

## 2022-11-29 RX ADMIN — Medication 2 G: at 07:49

## 2022-11-29 RX ADMIN — MIDAZOLAM 2 MG: 1 INJECTION INTRAMUSCULAR; INTRAVENOUS at 07:16

## 2022-11-29 RX ADMIN — GLYCOPYRROLATE 0.6 MG: 0.2 INJECTION INTRAMUSCULAR; INTRAVENOUS at 10:11

## 2022-11-29 RX ADMIN — ROCURONIUM BROMIDE 10 MG: 10 INJECTION, SOLUTION INTRAVENOUS at 08:23

## 2022-11-29 RX ADMIN — Medication 100 MCG: at 08:15

## 2022-11-29 RX ADMIN — Medication 100 MCG: at 08:26

## 2022-11-29 RX ADMIN — PROPOFOL 170 MG: 10 INJECTION, EMULSION INTRAVENOUS at 07:26

## 2022-11-29 RX ADMIN — PROPOFOL 20 MG: 10 INJECTION, EMULSION INTRAVENOUS at 09:48

## 2022-11-29 RX ADMIN — FENTANYL CITRATE 25 MCG: 50 INJECTION, SOLUTION INTRAMUSCULAR; INTRAVENOUS at 09:00

## 2022-11-29 RX ADMIN — DEXAMETHASONE SODIUM PHOSPHATE 8 MG: 10 INJECTION INTRAMUSCULAR; INTRAVENOUS at 07:42

## 2022-11-29 RX ADMIN — ROCURONIUM BROMIDE 5 MG: 10 INJECTION, SOLUTION INTRAVENOUS at 08:51

## 2022-11-29 RX ADMIN — Medication 100 MCG: at 07:52

## 2022-11-29 RX ADMIN — ONDANSETRON 4 MG: 2 INJECTION INTRAMUSCULAR; INTRAVENOUS at 09:46

## 2022-11-29 ASSESSMENT — PAIN SCALES - GENERAL
PAINLEVEL_OUTOF10: 8
PAINLEVEL_OUTOF10: 10
PAINLEVEL_OUTOF10: 4
PAINLEVEL_OUTOF10: 6
PAINLEVEL_OUTOF10: 10
PAINLEVEL_OUTOF10: 2
PAINLEVEL_OUTOF10: 6

## 2022-11-29 ASSESSMENT — PAIN - FUNCTIONAL ASSESSMENT: PAIN_FUNCTIONAL_ASSESSMENT: NONE - DENIES PAIN

## 2022-11-29 ASSESSMENT — PAIN DESCRIPTION - DESCRIPTORS: DESCRIPTORS: ACHING

## 2022-11-29 ASSESSMENT — PAIN DESCRIPTION - ORIENTATION: ORIENTATION: RIGHT

## 2022-11-29 ASSESSMENT — PAIN DESCRIPTION - LOCATION: LOCATION: ARM

## 2022-11-29 NOTE — PROGRESS NOTES
Neurosurgery Post op Progress Note      POD# 0 s/p Guyon, cubital and carpal tunnel release    SUBJECTIVE:      Patient presents with right guyon, cubital and carpal tunnel syndrome symptoms for past two years, worsening. Symptoms are limiting her daily functions such as work at [employer]. Failed conservative treatments and PT/OT      OBJECTIVE      Physical exam   VITALS:    Vitals:    11/29/22 1115   BP:    Pulse:    Resp:    Temp: 97.1 °F (36.2 °C)   SpO2:        Gen: Resting comfortably, no acute distress  CV: RRR  Resp: Lungs clear to ausculation  Abd: soft, non-tender  Skin: cap refill <2 seconds    Neurological exam     CONSTITUTIONAL:  Well developed, well nourished, alert and oriented x 3, in no acute distress. GCS 15. Nontoxic.     HEAD:  normocephalic, atraumatic    ENT:  moist mucous membranes   NECK:  supple, symmetric, no midline tenderness to palpation    BACK:  without midline tenderness, step-offs or deformities    LUNGS:  Equal air entry bilaterally   CARDIOVASCULAR:  normal s1 / s2   ABDOMEN:  Soft, no rigidity   NEUROLOGIC:  Mental Status:  A & O x3,awake             Cranial Nerves:    Grossly intact    Motor Exam:    Drift:  absent  Tone:  normal    Motor exam is symmetrical 5 out of 5 all extremities bilaterally - difficult to assess right arm in bandages    Sensory:    Touch:    Right Upper Extremity:  abnormal - decreased but present in finger tips  Left Upper Extremity:  normal     SKIN:  no rash        Wound   Dressing is clean/dry/intact with no signs of drainage       ASSESSMENT AND PLAN    27 y.o. female is post op day # 0 s/p Guyon, cubital and carpal tunnel release    - Analgesia: Percocet and tylenol at home  - Keflex at home  - Diet: NPO, Advance as tolerated  - splint      Electronically signed by Butch Padilla DO on 11/29/2022 at 11:21 AM

## 2022-11-29 NOTE — INTERVAL H&P NOTE
Pt Name: Nicolas Hoyt  MRN: [id number]  [place]: 1990  Date of evaluation: 11/29/2022    I have reviewed the patient's history and physical examination completed in pre-admission testing on 11/18/22    No changes to history or on examination today, unless noted below. None.      Marcus Alegria, ALEX - CNP  11/29/22  6:43 AM

## 2022-11-29 NOTE — DISCHARGE INSTRUCTIONS
Carpal, Cubital and Guyon Tunnel Release: What to Expect at Home  Your Recovery    Your hand will hurt and may feel weak with some numbness. This usually goes away in a few days, but it may take several months. Your doctor will take out your stitches in 1-2 weeks. Your hand and wrist may feel worse than they used to feel. But the pain should start to go away. It usually takes 3 to 4 months to recover and up to 1 year before hand strength returns. How much strength returns will vary. The timing of your return to work and depends on the type of surgery you had, whether the surgery was on your dominant hand (the hand you use most), and your work activities. If you had open surgery on your dominant hand and you do repeated actions at work, you may be able to go back to work in 10 to 8 weeks. Repeated motions include typing or assembly-line work. If the surgery was on the other hand and you don't do repeated actions at work, you may be able to return to work in 9 to 14 days. This care sheet gives you a general idea about how long it will take for you to recover. But each person recovers at a different pace. Follow the steps below to get better as quickly as possible. How can you care for yourself at home? Activity  Rest when you feel tired. Getting enough sleep will help you recover. Try to walk each day. Start by walking a little more than you did the day before. Bit by bit, increase the amount you walk. For up to 2 weeks after surgery, avoid lifting things heavier than 1 to 2 pounds and using your hand. This includes doing repeated arm or hand movements, such as typing or using a computer mouse, washing windows, vacuuming, or chopping food. Do not use power tools, and avoid activities that cause vibration. You may do heavier tasks about 4 weeks after surgery. These include vacuuming, mowing the lawn, and gardening. You may shower 24 to 48 hours after surgery, if your doctor okays it.  Keep your bandage dry by taping a sheet of plastic to cover it. If you have a splint, keep it dry. Your doctor will tell you if you can remove it when you shower. Be careful not to put the splint on too tight. Do not take a bath until the incision heals, or until your doctor tells you it is okay. You may drive when you are fully able to use your hand. Diet  You can eat your normal diet. If your stomach is upset, try bland, low-fat foods like plain rice, broiled chicken, toast, and yogurt. Medicines  Your doctor will tell you if and when you can restart your medicines. He or she will also give you instructions about taking any new medicines. If you take aspirin or some other blood thinner, ask your doctor if and when to start taking it again. Make sure that you understand exactly what your doctor wants you to do. Take pain medicines exactly as directed. If the doctor gave you a prescription medicine for pain, take it as prescribed. If you are not taking a prescription pain medicine, take an over-the-counter medicine such as acetaminophen (Tylenol), ibuprofen (Advil, Motrin), or naproxen (Aleve). Read and follow all instructions on the label. Do not take two or more pain medicines at the same time unless the doctor told you to. Many pain medicines have acetaminophen, which is Tylenol. Too much acetaminophen (Tylenol) can be harmful. If you think your pain medicine is making you sick to your stomach: Take your medicine after meals (unless your doctor has told you not to). Ask your doctor for a different pain medicine. If your doctor prescribed antibiotics, take them as directed. Do not stop taking them just because you feel better. You need to take the full course of antibiotics. Incision and splint care  Keep your bandage dry. If it gets dirty, you may change it.   You can take off all the bandage in 3 days, but keep using the splint until you come for your post-op appointment     Exercise  You can use your finger but minimize using your wrist for the first few weeks  You may need wrist and hand rehabilitation. This is a series of exercises you do after your surgery. This helps you get back your wrist's and hand's range of motion, strength, and . You will work with your doctor and physical or occupational therapist to plan this exercise program. To get the best results, you need to do the exercises correctly and as often and as long as your doctor tells you. Ice and elevation  Put ice or a cold pack on your wrist for 10 to 20 minutes at a time. Try to do this every 1 to 2 hours for the next 3 days (when you are awake) or until the swelling goes down. Put a thin cloth between the ice and your skin. Prop up the sore wrist on a pillow when you ice it or anytime you sit or lie down during the next 3 days. Try to keep it above the level of your heart. This will help reduce swelling. Other instructions  Avoid letting your hand hang down. This can cause swelling. Follow-up care is a key part of your treatment and safety. Be sure to make and go to all appointments, and call your doctor if you are having problems. It's also a good idea to know your test results and keep a list of the medicines you take. When should you call for help? [id number] anytime you think you may need emergency care. For example, call if:  You passed out (lost consciousness). You have chest pain, are short of breath, or cough up blood. Call your doctor now or seek immediate medical care if:  You have pain that does not get better after you take pain medicine. Your hand is cool or pale or changes color. Your cast or splint feels too tight. You have tingling, weakness, or numbness in your hand or fingers. You are sick to your stomach or cannot drink fluids. You have loose stitches, or your incision comes open.   You have signs of a blood clot in your leg (called a deep vein thrombosis), such as:  Pain in your calf, back of the knee, thigh, or groin.  Redness or swelling in your leg. You have signs of infection, such as: Increased pain, swelling, warmth, or redness. Red streaks leading from the incision. Pus draining from the incision. A fever. Bright red blood has soaked through the bandage over your incision. Watch closely for any changes in your health, and be sure to contact your doctor if:  You have a problem with your cast or splint. You do not get better as expected.

## 2022-11-29 NOTE — TELEPHONE ENCOUNTER
Patient called re nausea unrelated to meds since arriving home    Zofran sent to [email or web address]

## 2022-11-30 NOTE — OP NOTE
Operative Note      Patient: Julio Officer  YOB: 1990  MRN: [id number]    Date of Procedure: 11/29/2022    Pre-Op Diagnosis: RIGHT GUYONS, CUBITAL AND CARPAL TUNNEL SYNDROME    Post-Op Diagnosis: Same       Patient with persistent issues of numbness and tingling in the right upper extremity involving all of the digits. EMG indicative of a median nerve and carpal tunnel release. Procedure  Right-sided Guyon's canal release  Right-sided carpal tunnel release  Right-sided ulnar nerve transposition subcutaneous    Surgeon(s):  Jodi Soulier, DO    Assistant:   * No surgical staff found *    Anesthesia: General    Estimated Blood Loss (mL): less than 50     Complications: None    Specimens:   * No specimens in log *    Implants:  * No implants in log *      Drains: * No LDAs found *    Findings: Significant entrapment of the ulnar nerve behind the medial epicondyle with subluxation on flexion passive. Significant thickening of the Guyon's ligament with compression of the ulnar nerve    Detailed Description of Procedure: The patient was consented preoperatively. She was brought to the operative room placed under direct vision. She was placed upon the regular bed with all pressure was padded right extremity sterile prepped and draped. Curvilinear incision directly over the cubital tunnel offsetting over the medial epicondyle anteriorly was marked out. After sterile prep and drape and timeout performed I infiltrated the incision with 1% lidocaine. 15 blade was used to perform incision followed Bovie to take down the subcutaneous layer followed by itself in retractor and sharp dissection using pickups as well as tenotomy scissors. Bipolar cautery is used obtain hemostasis. Identified the [place] ligament directly and incised sharply with a 15 blade identifying the nerve.   Blunt tip scissors were used to completely cut the sternal ligament and extended proximally to the intermuscular septum which was also released. Then followed distally identifying the flexor carpi ulnaris and  the heads and incising distally. Complete release of the nerve was performed. Passive flexion unfortunately revealed subluxation of the nerve partially over the medial epicondyle. Decision was made at this point to proceed with transposition. Complete neurolysis or compression of the nerve was performed proximal and distal to the medial condyle as well as within the cubital tunnel. Small branches to the flexor carpi ulnaris resection that were needed to be in order to allow for mobilization of the nerve anteriorly. The superficial fascia the flexor carpi ulnaris was then gently peeled off of the muscle using a combination of Bovie as well as 15 blade. Subcutaneous fat layer was identified. A cuff was created with a subcutaneous fat as well as the fascia. The nerve was transposed anterior to the medial condyle and the cuff was sutured together using multiple 3-0 Vicryl sutures. Passive lection then noted that there was significantly relax and there was no area of kinking. The wound was thoroughly irrigated and hemostasis was obtained with bipolar cautery. The wound was closed in a single layer using multiple 3-0 Vicryl's for the subcutaneous layer followed by benzoin and Steri-Strips and an [place]. I then turned my attention to the wrist.    Linear incision just proximal to the wrist crease ending just proximal to the distal palmar crease in line with the ring finger was then marked out. After sterile prepping and draping I treated the incision with 1% lidocaine. 15 blade was used to perform incision of all the skin hooks to retract the skin edges. Bipolar was used to cauterize followed by blunt dissection using tenotomy scissors proximal identifying the Guyon's ligament. This was immediately sectioned releasing the neurovascular bundle.   I followed this with blunt tip scissors proximal to the transverse wrist crease ensuring that was complete release into the forearm and distally. I then proceeded to dissect the neurovascular bundle distal to the Guyon's ligament and the palm. Palmar fat was cauterized with bipolar cautery. Identify the neurovascular bundle distally and released the sensory branch as well. Circumferential neurolysis of the nerve was performed deep to the artery noting that it was completely free from distal all the way proximal to the wrist.  At this point thorough irrigation was performed and bipolar cautery was used to obtain hemostasis. I then used a [place] retractor to retract the medial edge of the incision flap. Metzenbaum scissors as well as blunt dissection using a Ray-Randy were used to identify the sheen of the transverse carpal ligament which was deep to the Guyon's canal.  Once this was identified to send retractors were used by the assistant to retract the lateral incision. 15 blade is used to perform a sharp fasciotomy and feathering layer by layer until a small window was identified within the carpal tunnel. Blunt and Metzenbaum scissors were used to extend the fasciotomy distally to the pad pad and proximally into the forearm. Complete release of the median nerve in the carpal tunnel contents was then noted. I then used a [place] 4 to ensure complete release of the median nerve the ulnar nerve at the wrist as well. Thorough irrigation was performed and bipolar cautery was used for hemostasis. The wound was then reapproximated using 3-4 3-0 Vicryl sutures in the subcutaneous layer as well as interrupted vertical mattress nylons for the skin. Bacitracin island was placed over the wound. Fluffs Kerlix and an Ace bandage were used as compression wraps over the upper extremity.   Patient was then awoken stable condition returned to PACU    Electronically signed by Anatoly Chen DO on 11/30/2022 at 5:22 PM

## 2022-12-13 ENCOUNTER — TELEPHONE (OUTPATIENT)
Dept: NEUROSURGERY | Age: 32
End: 2022-12-13

## 2022-12-13 DIAGNOSIS — G56.21 CUBITAL CANAL COMPRESSION SYNDROME, RIGHT: Primary | ICD-10-CM

## 2022-12-13 DIAGNOSIS — G89.18 POST-OP PAIN: ICD-10-CM

## 2022-12-13 RX ORDER — TRAMADOL HYDROCHLORIDE 50 MG/1
50 TABLET ORAL EVERY 6 HOURS PRN
Qty: 28 TABLET | Refills: 0 | Status: SHIPPED | OUTPATIENT
Start: 2022-12-13 | End: 2022-12-20

## 2022-12-13 NOTE — TELEPHONE ENCOUNTER
Looks like the patient has an allergy to hydrocodone as well. We could try tramadol, if she would like.

## 2022-12-13 NOTE — TELEPHONE ENCOUNTER
Patient called in with complaints of itching after taking oxycodone. Patient is requesting a alternative. Surgery 11.29.22. Follow up 12.14.22.

## 2022-12-14 ENCOUNTER — OFFICE VISIT (OUTPATIENT)
Dept: NEUROSURGERY | Age: 32
End: 2022-12-14

## 2022-12-14 VITALS
OXYGEN SATURATION: 97 % | HEART RATE: 73 BPM | SYSTOLIC BLOOD PRESSURE: 109 MMHG | HEIGHT: 60 IN | TEMPERATURE: 97.5 F | WEIGHT: 151 LBS | BODY MASS INDEX: 29.64 KG/M2 | DIASTOLIC BLOOD PRESSURE: 73 MMHG

## 2022-12-14 DIAGNOSIS — G56.01 RIGHT CARPAL TUNNEL SYNDROME: Primary | ICD-10-CM

## 2022-12-14 DIAGNOSIS — G56.21 CUBITAL CANAL COMPRESSION SYNDROME, RIGHT: ICD-10-CM

## 2022-12-14 DIAGNOSIS — G89.18 POST-OP PAIN: ICD-10-CM

## 2022-12-14 PROCEDURE — 99024 POSTOP FOLLOW-UP VISIT: CPT | Performed by: NURSE PRACTITIONER

## 2022-12-14 NOTE — PROGRESS NOTES
[address]  [facility] [address] [facility] [address]  Dept: [phone]    Patient:  Mickey Valdes  YOB: 1990  Date: 12/14/22    The patient is a 27 y.o. female who presents today for consult of the following problems:     Chief Complaint   Patient presents with    Other         HPI:     Mickey Valdes is a 27 y.o. female who presents to the office for post-op evaluation s/p right ulnar nerve transposition, Guyon and carpal tunnel release. Patient doing very well. Reports significant improvement to numbness and tingling. Still with some expected surgical discomfort. Incisions healing well, denies any discharge, drainage, fevers or chills. Presents today for suture removal.  Did report some issues with itching with the use of postop pain medication. This was switched to tramadol, states that she has been tolerating that well and it is effectively managing her postop pain. Incisions CDI  Strength 5/5  Sensation intact on exam    Date of surgery: 11/29/2022    Assessment and Plan:      1. Right carpal tunnel syndrome    2. Cubital canal compression syndrome, right    3. Post-op pain          Plan: Patient doing very well postoperatively. Incisions healed well. Steri-Strips remain intact to cubital tunnel, intact sutures removed from carpal tunnel incision. Patient tolerated well. Referral provided for occupational therapy. Patient to return in January for next postop visit as scheduled with Dr. Daniel Keenan. Followup: Return in about 6 weeks (around 1/25/2023), or if symptoms worsen or fail to improve. Prescriptions Ordered:  No orders of the defined types were placed in this encounter.        Orders Placed:  Orders Placed This Encounter   Procedures    [facility] - [facility]     Referral Priority:   Routine     Referral Type:   Eval and Treat Referral Reason:   Specialty Services Required     Requested Specialty:   Occupational Therapy     Number of Visits Requested:   1        Electronically signed by ALXE Cox CNP on 12/14/2022 at 11:18 AM    Please note that this chart was generated using voice recognition Dragon dictation software. Although every effort was made to ensure the accuracy of this automated transcription, some errors in transcription may have occurred.

## 2022-12-21 ENCOUNTER — HOSPITAL ENCOUNTER (OUTPATIENT)
Dept: OCCUPATIONAL THERAPY | Facility: CLINIC | Age: 32
Setting detail: THERAPIES SERIES
Discharge: HOME | End: 2022-12-21

## 2022-12-21 NOTE — SIGNIFICANT EVENT
[] [facility] [address]        Occupational Therapy       [address] [facility] [address]       Phone: [phone]       Fax: [phone] [x] [facility] Occupational  Therapy at [facility]       Phone: [phone]       Fax: [phone]          Occupational Therapy Cancel/No Show note    Date: 2022  Patient: Ahmed Mcburney  : 1990  MRN: [id number]  Cancels/No Shows to date:     For today's appointment patient:  []  Cancelled  []  Rescheduled appointment  [x]  No-show     Reason given by patient:  []  Patient ill  []  Conflicting appointment  []  No transportation    []  Conflict with work  []  No reason given  []  Other:     Comments:      Electronically signed by: BIPIN Garcia/IZZY

## 2022-12-27 ENCOUNTER — PATIENT MESSAGE (OUTPATIENT)
Dept: NEUROSURGERY | Age: 32
End: 2022-12-27

## 2022-12-29 DIAGNOSIS — G56.01 RIGHT CARPAL TUNNEL SYNDROME: Primary | ICD-10-CM

## 2022-12-29 DIAGNOSIS — G56.21 CUBITAL CANAL COMPRESSION SYNDROME, RIGHT: ICD-10-CM

## 2022-12-29 DIAGNOSIS — G89.18 POST-OP PAIN: ICD-10-CM

## 2022-12-29 RX ORDER — GABAPENTIN 300 MG/1
300 CAPSULE ORAL 3 TIMES DAILY
Qty: 90 CAPSULE | Refills: 0 | Status: SHIPPED | OUTPATIENT
Start: 2022-12-29 | End: 2023-02-07 | Stop reason: SDUPTHER

## 2022-12-30 ENCOUNTER — TELEMEDICINE (OUTPATIENT)
Dept: NEUROSURGERY | Age: 32
End: 2022-12-30

## 2022-12-30 DIAGNOSIS — G56.21 CUBITAL CANAL COMPRESSION SYNDROME, RIGHT: ICD-10-CM

## 2022-12-30 DIAGNOSIS — G56.21 GUYON SYNDROME, RIGHT: ICD-10-CM

## 2022-12-30 DIAGNOSIS — G89.18 POST-OP PAIN: ICD-10-CM

## 2022-12-30 DIAGNOSIS — G56.01 RIGHT CARPAL TUNNEL SYNDROME: Primary | ICD-10-CM

## 2022-12-30 PROCEDURE — 99024 POSTOP FOLLOW-UP VISIT: CPT | Performed by: NURSE PRACTITIONER

## 2022-12-30 NOTE — PROGRESS NOTES
[facility],4Th Floor Neurosurgery Telehealth Post-op Visit    Pt Name: Silvano Neumann  MRN: [id number]  [place]: 1990  Date of evaluation: 2022  Primary Care Physician: Jayde Sainz MD  Reason for Evaluation: TELEHEALTH EVALUATION -- Audio/Visual (During LWCNR-56 public health emergency) and arm pain    TELEHEALTH EVALUATION -- Audio/Visual (During AHMJU-08 public health emergency)    HPI:    Silvano Neumann (:  1990) has requested an audio/video evaluation for the following concern(s):    Patient presents for virtual visit with concerns regarding which she describes to be muscle spasms to her right arm. Patient recently underwent right carpal tunnel, cubital tunnel at elbow and wrist releases. Incisions have healed well. Patient states that she will experience muscle tightness/spasms primarily to right bicep and right antecubital region. Finds heat to be the most effective. Does feel that symptoms have improved slightly since yesterday. Has been trying to keep arm elevated and has been using pillows at night. Has initial Occupational Therapy evaluation next week. Patient has been significantly limiting her right arm and wrist movement. Prior to Visit Medications    Medication Sig Taking? Authorizing Provider   gabapentin (NEURONTIN) 300 MG capsule Take 1 capsule by mouth 3 times daily for 30 days.  Intended supply: 90 days  ALEX Chandra - CNP   ondansetron (ZOFRAN-ODT) 4 MG disintegrating tablet Take 1 tablet by mouth every 8 hours as needed for Nausea or Vomiting  William Shore DO   acetaminophen (TYLENOL) 500 MG tablet Take 2 tablets by mouth every 8 hours as needed for Pain  Kaitlin Romeo, DO   Elastic Bandages & Supports (WRIST SPLINT/COCK-UP/RIGHT SM) MISC 1 each by Does not apply route nightly as needed (wrist pain)  Kaitlin Romeo, DO   amitriptyline (ELAVIL) 25 MG tablet Take 1 tablet by mouth nightly  Beecher Shone, MD       Social History     Tobacco Use    Smoking status: Former     Packs/day: 0.50     Years: 17.00     Pack years: 8.50     Types: Cigarettes     Start date:      Quit date:      Years since quittin.9    Smokeless tobacco: Former     Quit date: 2020   Vaping Use    Vaping Use: Former    Quit date: 3/25/2020   Substance Use Topics    Alcohol use: Yes     Comment: \"5-8 drinks on a holiday. 1 or 2 drinks once a week. \"    Drug use: Yes     Types: Marijuana [place]ee [place])     Comment: Edibles - \"One every couple days. \"  CBD and THC. Allergies   Allergen Reactions    Other Hives     Paprika     Turmeric Hives    Codeine Other (See Comments)     Per genetic testing she has found that she over-metabolizes medication     Tylenol [Acetaminophen] Other (See Comments)     Per genetic testing she has found that she over-metabolizes medication     Vicodin [Hydrocodone-Acetaminophen] Other (See Comments)     Per genetic testing she has found that she over-metabolizes medication    ,   Past Medical History:   Diagnosis Date    Anxiety     Attention-deficit hyperactivity disorder 2021    Chronic low back pain     hx bulgin disc, used to see pain mgmt [place] [address] [facility]    COVID-19 2022    Headache, nausea, sinus congestion, achey, fever, chills  X1 week. Depression     Endometriosis     Fibromyalgia 2020    Hx lyrica, no longer, also no longer taking Gabapentin, but still keeps on med list    Gastroesophageal reflux disease without esophagitis 2021    HPV (human papilloma virus) anogenital infection     Hyperhidrosis     PTSD (post-traumatic stress disorder)     Under care of team 2022    NEUROLOGY - DR. MOREJON - last visit 2022    Under care of team 2022    NEUROSURGERY - DR. ANDERSON - last visit 10/2022    Wears contact lenses 2022    Wears glasses 2022    Wellness examination 2022    PCP - DR. MALLORY - last viait - 2022   ,   Past Surgical History:   Procedure Laterality Date    CARPAL TUNNEL RELEASE Right 11/29/2022    ULNAR NERVE TRANSPOSITION AND GUYON, CARPAL TUNNEL RELEASE    CARPAL TUNNEL RELEASE Right 11/29/2022    ULNAR NERVE TRANSPOSITION AND GUYON, CARPAL TUNNEL RELEASE. performed by Malu Ramirez DO at [address]  2008    COLPOSCOPY      2010, 2011 x 2     ENDOMETRIAL BIOPSY  01/31/2020    results were WNL     TONSILLECTOMY      WISDOM TOOTH EXTRACTION       ROS  Constitutional: Negative for activity change and appetite change. HENT: Negative for ear pain and facial swelling. Eyes: Negative for discharge and itching. Respiratory: Negative for choking and chest tightness. Cardiovascular: Negative for chest pain and leg swelling. Gastrointestinal: Negative for nausea and abdominal pain. Endocrine: Negative for cold intolerance and heat intolerance. Genitourinary: Negative for frequency and flank pain. Musculoskeletal: Negative for myalgias and joint swelling. Skin: Negative for rash and wound. Allergic/Immunologic: Negative for environmental allergies and food allergies. Hematological: Negative for adenopathy. Does not bruise/bleed easily. Psychiatric/Behavioral: Negative for self-injury. The patient is not nervous/anxious. PHYSICAL EXAMINATION:  [INSTRUCTIONS:  \"[x]\" Indicates a positive item  \"[]\" Indicates a negative item  -- DELETE ALL ITEMS NOT EXAMINED]  Vital Signs: (As obtained by patient/caregiver at home)    Constitutional: [x] Appears well-developed and well-nourished [x] No apparent distress      [] Abnormal   Mental status  [x] Alert and awake  [x] Oriented to person/place/time [x]Able to follow commands        Eyes:  EOM    [x]  Normal  [] Abnormal-  Sclera  [x]  Normal  [] Abnormal -         Discharge []  None visible  [] Abnormal -    HENT:   [x] Normocephalic, atraumatic.   [] Abnormal   [x] Mouth/Throat: Mucous membranes are moist.     External Ears [x] Normal  [] Abnormal-    Neck: [x] No visualized mass     Pulmonary/Chest: [x] Respiratory effort normal.  [x] No visualized signs of difficulty breathing or respiratory distress        [] Abnormal      Musculoskeletal:   [] Normal gait with no signs of ataxia         [x] Normal range of motion of neck        [] Abnormal       Neurological:        [x] No Facial Asymmetry (Cranial nerve 7 motor function) (limited exam to video visit)          [x] No gaze palsy        [] Abnormal         Skin:        [x] No significant exanthematous lesions or discoloration noted on facial skin         [] Abnormal            Psychiatric:       [x] Normal Affect [] Abnormal        [x] No Hallucinations      Due to this being a TeleHealth encounter, evaluation of the following organ systems is limited: Vitals/Constitutional/EENT/Resp/CV/GI//MS/Neuro/Skin/Heme-Lymph-Imm. ASSESSMENT/PLAN:   Diagnosis Orders   1. Right carpal tunnel syndrome        2. Cubital canal compression syndrome, right        3. Guyon syndrome, right        4. Post-op pain            Increased dose of gabapentin sent to pharmacy yesterday, patient advised that should be ready for her to obtain today. Continue utilizing heat as needed as this is the most relieving for her. Symptoms have improved today compared to yesterday. Recommend continued monitoring. Work on gentle range of motion. Start occupational therapy as planned. Patient has next postop visit in 3 to 4 weeks with Dr. Marizol Gregory, keep appointment as planned. Notify office if symptoms do not continue to improve. No follow-ups on file. An  electronic signature was used to authenticate this note. --ALEX Peter - CNP on 12/30/2022 at 3:35 PM    Time Spent in Counseling: n/a minutes  Patient given educational materials - see patient instructions. Discussed use, benefit, and side effects of prescribed medications. Personally reviewed imaging with patients and all questions answered. Pt voiced understanding. Patient agreed with treatment plan.  Follow up as directed above. Pursuant to the emergency declaration under the [facility] waiver authority and the [employer] Act, this Virtual  Visit was conducted, with patient's consent, to reduce the patient's risk of exposure to COVID-19 and provide continuity of care for an established patient. Services were provided through a video synchronous discussion virtually to substitute for in-person clinic visit. This is a telehealth visit that was performed with the originating site at Patient Location of home and Provider Location of [facility], [address] [facility]. Verbal consent to participate in video visit was obtained. Pursuant to the emergency declaration under the [facility] waiver authority and the [employer] Act, this Virtual Visit was conducted, with patient's consent, to reduce the patient's risk of exposure to COVID-19 and provide continuity of care for an established patient. Services were provided through a video synchronous discussion virtually to substitute for in-person clinic visit. I discussed with the patient the nature of our telehealth visits via interactive/real-time audio/video that:  - I would evaluate the patient and recommend diagnostics and treatments based on my assessment  - Our sessions are not being recorded and that personal health information is protected  - Our team would provide follow up care in person if/when the patient needs it.

## 2023-01-06 ENCOUNTER — HOSPITAL ENCOUNTER (OUTPATIENT)
Dept: OCCUPATIONAL THERAPY | Facility: CLINIC | Age: 33
Setting detail: THERAPIES SERIES
Discharge: HOME | End: 2023-01-06
Payer: OTHER GOVERNMENT

## 2023-01-06 PROCEDURE — 97165 OT EVAL LOW COMPLEX 30 MIN: CPT

## 2023-01-06 PROCEDURE — 97110 THERAPEUTIC EXERCISES: CPT

## 2023-01-06 NOTE — CONSULTS
[] [facility]       Occupational Therapy 1st floor       [address], [place]         Phone: [phone]       Fax: [phone] [x] Kevin  Occupational Therapy  [address], [place]  Phone: [phone]  Fax: [phone]       Occupational Therapy Hand & Upper Extremity  Initial Evaluation    Date: 2023      Patient: Liza Love  : 1990  MRN: [id number]  Referring Provider:  ALEX Simon CNP  Insurance: Other  - based on med [employer]  Medical Diagnosis: Right carpal tunnel syndrome (G56.01), Cubital canal compression syndrome, right (G56.21), Post-op pain (G89.18)  Rehab Codes: spasms of muscle other M62.838, , pain in right upper arm M79.621,, pain in right forearm M79.631,, or pain in right hand M79.641,  Onset Date: 22    Next Dr. Waqas Argueta: 23    Subjective:   CC: Pn, scar sensitivity, and muscle spasms     HPI: Pt reports since ulnar nerve transposition on 22 her nerve pn has improved but she is experiencing post-op pn and muscle spasms. Pt states her pn is intermittent and increases w/ activity. Pt states she has sensitivity over her scars which she notices more when she is wearing a sweatshirt or jacket. Pt reports difficulty completing ADLs/IADLs and requiring assistance w/ bathing, dressing, cooking and cleaning. Pt reports she was prescribed gabapentin for nerve pn and muscle spasms. Pt stated she was informed by MD not to lift anything over 1lbs and to avoid driving, computer work, and vibration. Mechanism of Injury: Post-op  Surgery Date:  22 Precautions:  Other no lifting over 1lb, vibration, driving, computer     Involved Extremity: Right   Dominant Extremity: Right    Past Medical History: Unremarkable          Comorbidities: NA    Medications: Refer to Medical chart in Epic Allergies:  Refer to Medical chart in Epic    Pain: Intensity:   6/10 Location: R hand and elbow     Pain Type: Intermittant and with movement/activity  Pain Altered Tx: no  Action Taken:none            Home Environment:    Pt lives in a  and House With 4+ and L Handrail MALINI  The washing machine is on and the lower level (basement)    Vocation [employer]    Job Status []Normal duty []Light duty [x] Off due to condition []Retired  []Not employed []Disability  []Other:  []  Return to work: Work activities/duties      Avocational Activities     [x] [address]     [] Grandparenting     [] Care giver [] OTHER    [] NA       ADL/IADL Previous level of function Current level of function Who assists or modifications made or needed   Bathing  [x] Independent    [] Assist  [] Independent    [x] Assist   assists w/ washing L side    Dress/grooming [x] Independent    [] Assist  [] Independent    [x] Assist   assist w/ donning/doffing UB clothing   Transfer/mobility [x] Independent    [] Assist  [x] Independent    [] Assist     Feeding [x] Independent    [] Assist  [x] Independent    [] Assist     Toileting [x] Independent    [] Assist  [x] Independent    [] Assist     Driving [x] Independent    [] Assist  [] Independent    [x] Assist  Pt not cleared to drive by MD   Housekeeping [x] Independent    [] Assist  [] Independent    [x] Assist   and son complete   Grocery shop/meal prep [x] Independent    [] Assist  [] Independent    [x] Assist  Pt does not complete d/u sx precautions      Device: Independent   Orthosis: NA    Objective: Tests/Measurements: Upper Extremity Functional Index  Current Functional Level:  24/80 functionally impaired as measured with the Upper Extremity Functional Index Survey. 0-80 scale, with 80 = no Deficits  (The UEFI model does not provide any specific cut off points that could classify the upper limb disability degree, however, a minimal detectable change of 9 points is provided. This means that for improvement or deterioration to be considered, between two subsequent evaluations, the scores must differ by at least 9 points.)    Sensibility: Burning and Intermittent Edema: Min      Skin Color: Normal Skin/Scar condition Healed, Scar Firm, and Scar Hypersensibility    Edema   Right Left   Elbow Joint 27cm 26.5cm   7cm below joint 14.5cm 16cm   Wrist joint 15.5cm 15cm       UE ROM/MMT   Right Left MMT Right MMT Left   Shoulder       [facility] 5/[facility]   Extension [facility] NT 5/5   Adduction [facility] NT 5/5   Internal Rotation [facility] WFL NT 5/5   External Rotation [facility] WFL NT 5/5   Elbow       Flexion [facility] WFL NT 5/5   Extension [facility] NT 5/5   Pronation WFL WFL NT 5/5   Supination  WFL WFL NT 5/5   Wrist       Flexion 45 45 NT 5/5   Extension 22 (35%) 62 NT 5/5   Radial deviation WFL WFL NT 5/5   Ulnar deviation WFL WFL NT 5/5        COORDINATION  - Fine Motor (speed/dexterity)     Right in seconds Percentile Left in seconds Percentile   9 Hole Peg Test 32 seconds  20 seconds          STRENGTH Strength testing not performed on this date secondary to sx precautions       Right   (pounds) Left   (pounds)    position 1 NT 50    position 2 NT 50   Lateral pinch NT 17   2 point pinch NT 10   3 jaw pinch NT 13     Bilateral  strength is normally symmetrical or up to 10% stronger on the dominant extremity, depending on the individual's physically activity level. Problems: Pain, ROM, Strength, Function, and Sensation     Assessment:  Patient would benefit from skilled occupational therapy services in order to:  Improve  functional /grasp, I with ADLS, ROM, Strength, Activity tolerance, and Complaint of pain in order to Ensure safe return to work, improve functional use of UE in ADL performance, decrease pain in UE for safe completion of ADLs, and return to PLOF     Short Term Goals: (  8    Treatments)  Decrease Pain by 2 points on numeric pain scale  Increase AROM of R wrist extension by 15 degrees  Increase strength (pounds); will set when appropriate w/ sx protocol Increase function:UE Functional Index Score 20 or more points to promote increased functional abilities  Scar will be soft and pliable with minimal tethering. Decrease Edema by .5cm  in R elbow and wrist  Pt will report ability to complete bathing w/ min assist   Pt will report ability to celine/doff clothing w/ min assist     Patient to be independent with home exercise program as demonstrated by performance with correct form without cues. Long Term Goals: (  16  Treatments)  Decrease Pain by 2 points on numeric pain scale  Increase AROM of R wrist extension by 15 degrees  Increase strength (pounds); will set when appropriate w/ sx protocol   Increase function:UE Functional Index Score 20 or more points to promote increased functional abilities  Scar will be soft and pliable with minimal tethering. Decrease Edema by .5cm  in R elbow and wrist  Pt will report ability to complete bathing independently  Pt will report ability to celine/doff clothing independently    Patient Goals: Normal use of hand/arm    Treatment Potential: Good Suggested Professional Referral: No  Domestic Concerns: No   Barriers to Goal Achievement: No    Comments/Assessment: Pt is a 32 yr/old R hand dominate female who presents on this date s/p ulnar nerve transposition, Guyon canal release and carpal tunnel release on 11/29/22. Pt presents to initial eval reporting post-op pn, muscle spasms and hypersensitive scars. Visually inspected incision cites, all are closed and healed well. Minimal tethering of the ulnar nerve transposition scar, and moderate tethering of Guyon canal/carpal tunnel release scar. Pt indicated hypersentivity to light touch to scars. Minimal edema noted in R elbow as compared to L UE. Pt demonstrated decreased [address] w/ 9-hole peg test in dominate R UE as compared to unaffected L UE. Pt's AROM in R UE is [facility] expect for wrist extension w/ is 35% of unaffected LUE.  Do not complete formal strength testing on R surgical side secondary to pt's report she was told not to lift over 1lbs. Pt would benefit from skilled OT services to address complaint of pn, decreased function, and decreased [address] in her dominate R UE for the completion of ADLs/IADLs. Home Program Initiated: Written, Verbal, and Demo Comprehension of Education: Yes       Plans, Goals, Discussed with, and Patient    Treatment Plan:   [x]  Therapeutic Exercise   16182              []  Iontophoresis: 4 mg/mL Dexamethasone Sodium Phosphate  mAmin  74488    [x]  Therapeutic Activity  28214  []  Vasopneumatic cold with compression  92154                []  ADL training  99943  []  Ultrasound        [id number]    []  Neuromuscular Re-education  06497  []  Electrical Stimulation Attended  29763    [x]  Manual Therapy  85108  Orthotic    []  Fit  19103     []  Train 38197    []  Instruction in [address]    []  Fit [id number]      []  Train 70872    []  Cognitive Interventions, (first 15 min 65247, subsequent 15 min 90457)  []  Cold/hotpack      []  Massage   87197           []  Medication allergies reviewed for use of    Dexamethasone Sodium Phosphate 4mg/ml     with iontophoresis treatments. Pt is not allergic. Treatment Plan:  Frequency: 1-2 x/week for 16 visits     Today's Treatment:  Modalities:  Precautions: no lifting over 1lb, vibration, driving, computer   Exercise Flow Sheet:  Exercise Reps/Time Weight/Level Comments   Elbow AROM  3x10 AROM HEP  Flexion/extension   Pronation/supination 3x10 AROM HEP   Wall wash 3x10 AAROM HEP   Desensitization   HEP   Wrist AROM 3x10 AROM HEP  Flexion/extension   Tendon gliding 3x10 AROM HEP   Gripping 2-3 mins Tan HEP  Tan putty   Other:     Specific Instructions for Next Treatment: start pulleys, cont wall washing     Evaluation Complexity:  History (Personal factors, comorbidities) []  0 [x]  1-2 []  3+   Exam (limitations, restrictions) [x]  1-2 []  3 []  4+   Decision Making [x]  Low []  Moderate []  High   ?  [x]  Low Complexity []  Moderate   Complexity []  High Complexity      Treatment Charges: Mins Units Time In/Out   Evaluation  []  High  []  Moderate  [x]  Low  20  1    [x]  Ther Exercise 30 2    []  Manual Therapy      []  Ther Activities      []  Orthotic fit/train      []  Orthotic recheck      []        Total Treatment time 50 min 3      Time In: 0900   Time out: 7864  Electronically signed by LORRAINE Sharp on 1/6/2023 at 8:57 AM    Physician Signature: _________________________ Date: _______________  By signing above or cosigning this note, I have reviewed this plan of care and certify a need for medically necessary rehabilitation services.      *PLEASE SIGN ABOVE AND FAX BACK ALL PAGES*

## 2023-01-13 ENCOUNTER — HOSPITAL ENCOUNTER (OUTPATIENT)
Dept: OCCUPATIONAL THERAPY | Facility: CLINIC | Age: 33
Setting detail: THERAPIES SERIES
Discharge: HOME | End: 2023-01-13
Payer: OTHER GOVERNMENT

## 2023-01-13 PROCEDURE — 97140 MANUAL THERAPY 1/> REGIONS: CPT

## 2023-01-13 PROCEDURE — 97110 THERAPEUTIC EXERCISES: CPT

## 2023-01-13 NOTE — FLOWSHEET NOTE
[] [place]       Occupational Therapy            1st floor       [address] [facility]         Phone: [phone]       Fax: [phone] [x] [facility] Occupational Therapy  [address] [facility]  Phone: [phone]  Fax: [phone]     Occupational Therapy Daily Treatment Note    Date:  2023  Patient Name:  Quiana Barnard    :  1990  MRN: [id number]  Referring Provider:  ALEX Vieira CNP  Insurance: Other [employer] [place] for 15 visits  Medical Diagnosis: Right carpal tunnel syndrome (G56.01), Cubital canal compression syndrome, right (G56.21), Post-op pain (G89.18)  Rehab Codes: spasms of muscle other M62.838, , pain in right upper arm M79.621,, pain in right forearm M79.631,, or pain in right hand M79.641,  Onset Date: 22               Next [address]: 23  Visit# / total visits: 2/15; Progress note for Medicare patient due at visit 8-9    Cancels/No Shows: 0/1      Subjective:    Pain:  [x] Yes  [] No Location: over scar on R hand and R forearm  Pain Rating: (0-10 scale) 6/10  Pain altered Tx:  [x] No  [] Yes  Action:  Pt Comments: pt reports her post surgical pn has improved since initial eval. Pt states her pn in her R elbow and hand has improved significantly. She report some soreness w/ completing ADLs/IADLs.        Objective:  Modalities:  Precautions:  Exercises:  Exercise Reps/Time Weight/Level Comments   Elbow AROM  3x10 AROM HEP  Flexion/extension   Pronation/supination 3x10 2lbs Completed   Wall wash 3x10 AAROM Completed  Flexion  Scaption    Wrist AROM 3x10 AROM HEP  Flexion/extension   Tendon gliding 3x10 AROM Not completed   Gripping 2-3 mins Moore Completed     Pullys  2 mins each AAROM  Flexion  Scaption    Shoulder extension 3x10 yellow Completed   Tricep extension  3x10 yellow Completed    Rows  3x10 yellow Completed    Serratus anterior slide 3x10 AROM Competed    Flexbar  Wrist Flexion/Ext  Wrist pronation  Wrist supination  Wrist ulnar deviation  Wrist radial dev 15 yellow Completed   Other:      Treatment Charges: Mins Units   []  Modalities:      []  Ultrasound     [x]  Ther Exercise 40 3   [x]  Manual Therapy 15 1   []  Ther Activities     []  Orthotic fit/train     []  Orthotic recheck     []  Other     Total Treatment time 55 mins 4       Assessment: [x] Progressing toward goals. Today is pt's first return visit since initial eval. Pt is progressing well thus far. Completed soft tissue mobilization to the R proximal forearm. Introduced IASTM to the medial forearm and scar, and scar on palm of R hand. Pt tolerated these well w/ min petechiae. Completed AAROM w/ pulleys. Completed postural strengthening and elbow/hand strengthening. Pt encouraged to use R UE functionally to completed ADLs/IADLs. Pt tolerated tx well on this date. [] No change. [] Other     [x] Patient would continue to benefit from skilled occupational therapy services in order to: Improve  functional /grasp, I with ADLS, ROM, Strength, Activity tolerance, and Complaint of pain in order to Ensure safe return to work, improve functional use of UE in ADL performance, decrease pain in UE for safe completion of ADLs, and return to PLOF      STG/LTG  Short Term Goals: (  8    Treatments)  Decrease Pain by 2 points on numeric pain scale  Increase AROM of R wrist extension by 15 degrees  Increase strength (pounds); will set when appropriate w/ sx protocol   Increase function:UE Functional Index Score 20 or more points to promote increased functional abilities  Scar will be soft and pliable with minimal tethering. Decrease Edema by .5cm  in R elbow and wrist  Pt will report ability to complete bathing w/ min assist   Pt will report ability to celine/doff clothing w/ min assist     Patient to be independent with home exercise program as demonstrated by performance with correct form without cues.      Long Term Goals: (  16 Treatments)  Decrease Pain by 2 points on numeric pain scale  Increase AROM of R wrist extension by 15 degrees  Increase strength (pounds); will set when appropriate w/ sx protocol   Increase function:UE Functional Index Score 20 or more points to promote increased functional abilities  Scar will be soft and pliable with minimal tethering. Decrease Edema by .5cm  in R elbow and wrist  Pt will report ability to complete bathing independently  Pt will report ability to celine/doff clothing independently      Pt. Education:  [x] Yes  [] No  [x] Reviewed Prior HEP/Ed  Method of Education: [] Verbal  [] Demo  [] Written  Re:  Comprehension of Education:  [x] Verbalizes understanding. [] Demonstrates understanding. [] Needs review. [] Demonstrates/verbalizes HEP/Ed previously given. Plan: [x] Continue current frequency toward short and long term goals.   [x] Specific Instructions for subsequent treatments: cont to progress strengthening    [] Other:       Time In: 1500  Time Out: 8615        Electronically signed by:  BIPIN Oconnor/IZZY Adult

## 2023-01-16 ENCOUNTER — HOSPITAL ENCOUNTER (OUTPATIENT)
Dept: OCCUPATIONAL THERAPY | Facility: CLINIC | Age: 33
Setting detail: THERAPIES SERIES
Discharge: HOME | End: 2023-01-16
Payer: OTHER GOVERNMENT

## 2023-01-16 PROCEDURE — 97140 MANUAL THERAPY 1/> REGIONS: CPT

## 2023-01-16 PROCEDURE — 97110 THERAPEUTIC EXERCISES: CPT

## 2023-01-16 NOTE — FLOWSHEET NOTE
[] [place] [facility]       Occupational Therapy            1st floor       [address] [place], [place]         Phone: [phone]       Fax: [phone] [x] [facility]  [address] [place] [address]  Phone: [phone]  Fax: [phone]     Occupational Therapy Daily Treatment Note    Date:  2023  Patient Name:  Wes Carbajal    :  1990  MRN: [id number]  Referring Provider:  ALEX Ludwig Ciro, CNP  Insurance: Other Jennifer Mess for 15 visits  Medical Diagnosis: Right carpal tunnel syndrome (G56.01), Cubital canal compression syndrome, right (G56.21), Post-op pain (G89.18)  Rehab Codes: spasms of muscle other M62.838, , pain in right upper arm M79.621,, pain in right forearm M79.631,, or pain in right hand M79.641,  Onset Date: 22               Next Dr. Cook [address]23  Visit# / total visits: 3/15; Progress note for Medicare patient due at visit 8-9    Cancels/No Shows: 0/1      Subjective:    Pain:  [x] Yes  [] No Location: over scar on R hand and R forearm  Pain Rating: (0-10 scale) 6/10  Pain altered Tx:  [x] No  [] Yes  Action:  Pt Comments: pt reports yesterday her pn kept her up all night.  Pt states she moved [place] lot of light boxes in her basement\" Pt reports she has started driving herself again     Objective:  Modalities:  Precautions:  Exercises:  Exercise Reps/Time Weight/Level Comments   Elbow AROM  3x10 AROM HEP  Flexion/extension   Pronation/supination 3x10 2lbs Completed   Wall wash 3x10 AAROM Completed  Flexion  Scaption    Wrist AROM 3x10 AROM HEP  Flexion/extension   Tendon gliding 3x10 AROM Not completed   Gripping 2-3 mins Moore Completed     Pullys  2 mins each AAROM  Flexion  Scaption    Shoulder extension 3x10 yellow Completed   Tricep extension  3x10 yellow Completed    Rows  3x10 yellow Completed    Serratus anterior slide 3x10 AROM Competed    Flexbar  Wrist Flexion/Ext  Wrist pronation  Wrist supination  Wrist ulnar deviation  Wrist radial dev 15 yellow Completed   Pulleys  2x2 mins AROM Completed  Flexion/scaption    UBE 2x2 mins AROM  Completed  Forward/backward    Other:      Treatment Charges: Mins Units   []  Modalities:      []  Ultrasound     [x]  Ther Exercise 35 2   [x]  Manual Therapy 15 1   []  Ther Activities     []  Orthotic fit/train     []  Orthotic recheck     []  Other     Total Treatment time 50 mins 3       Assessment: [x] Progressing toward goals. Began session w/ pulleys followed by UBE, pt completed theses well w/ min report of stretching. Completed scar massage over R elbow scar. Completed IASTM over R elbow scar, forearm and scar on R hand. Completed AROM exercises and light strengthening. Pt tolerated tx well w/ reports of exercises feeling easier than previous appointment. [] No change. [] Other     [x] Patient would continue to benefit from skilled occupational therapy services in order to: Improve  functional /grasp, I with ADLS, ROM, Strength, Activity tolerance, and Complaint of pain in order to Ensure safe return to work, improve functional use of UE in ADL performance, decrease pain in UE for safe completion of ADLs, and return to PLOF      STG/LTG  Short Term Goals: (  8    Treatments)  Decrease Pain by 2 points on numeric pain scale  Increase AROM of R wrist extension by 15 degrees  Increase strength (pounds); will set when appropriate w/ sx protocol   Increase function:UE Functional Index Score 20 or more points to promote increased functional abilities  Scar will be soft and pliable with minimal tethering. Decrease Edema by .5cm  in R elbow and wrist  Pt will report ability to complete bathing w/ min assist   Pt will report ability to celine/doff clothing w/ min assist     Patient to be independent with home exercise program as demonstrated by performance with correct form without cues.      Long Term Goals: (  16  Treatments)  Decrease Pain by 2 points on numeric pain scale  Increase AROM of R wrist extension by 15 degrees  Increase strength (pounds); will set when appropriate w/ sx protocol   Increase function:UE Functional Index Score 20 or more points to promote increased functional abilities  Scar will be soft and pliable with minimal tethering.  Decrease Edema by .5cm  in R elbow and wrist  Pt will report ability to complete bathing independently  Pt will report ability to celine/doff clothing independently      Pt. Education:  [] Yes  [x] No  [] Reviewed Prior HEP/Ed  Method of Education: [] Verbal  [] Demo  [] Written  Re:  Comprehension of Education:  [] Verbalizes understanding.  [] Demonstrates understanding.  [] Needs review.  [] Demonstrates/verbalizes HEP/Ed previously given.      Plan: [x] Continue current frequency toward short and long term goals.  [x] Specific Instructions for subsequent treatments: cont to progress strengthening    [] Other:       Time In: 1505  Time Out: 1555        Electronically signed by:  BIPIN Fabian/IZZY

## 2023-01-18 ENCOUNTER — HOSPITAL ENCOUNTER (OUTPATIENT)
Dept: OCCUPATIONAL THERAPY | Facility: CLINIC | Age: 33
Setting detail: THERAPIES SERIES
Discharge: HOME | End: 2023-01-18
Payer: OTHER GOVERNMENT

## 2023-01-18 PROCEDURE — 97110 THERAPEUTIC EXERCISES: CPT

## 2023-01-18 PROCEDURE — 97140 MANUAL THERAPY 1/> REGIONS: CPT

## 2023-01-18 NOTE — FLOWSHEET NOTE
[] [facility]       Occupational Therapy            1st floor       [address] [place], [place]         Phone: [phone]       Fax: [phone] [x] [facility] Occupational Therapy  [address] [place], [place]  Phone: [phone]  Fax: [phone]     Occupational Therapy Daily Treatment Note    Date:  2023  Patient Name:  Gemini Perera    :  1990  MRN: [id number]  Referring Provider:  ALEX Bush CNP  Insurance: Other [employer] for 15 visits  Medical Diagnosis: Right carpal tunnel syndrome (G56.01), Cubital canal compression syndrome, right (G56.21), Post-op pain (G89.18)  Rehab Codes: spasms of muscle other M62.838, , pain in right upper arm M79.621,, pain in right forearm M79.631,, or pain in right hand M79.641,  Onset Date: 22               Next Dr. Iggy Baumanner: 23  Visit# / total visits: 4/15; Progress note for Medicare patient due at visit 8-9    Cancels/No Shows: 0/1      Subjective:    Pain:  [x] Yes  [] No Location: over scar on R hand and R forearm  Pain Rating: (0-10 scale) 3/10  Pain altered Tx:  [x] No  [] Yes  Action:  Pt Comments: pt reports she is feeling better than she did at last visit. Pt states she received massage oils w/ a ball applicator that she has begun to use on her scars.      Objective:  Modalities:  Precautions:  Exercises:  Exercise Reps/Time Weight/Level Comments   Elbow AROM  3x10 AROM HEP  Flexion/extension   Pronation/supination 3x10 2lbs Completed   Wall wash 3x10 AAROM Completed  Flexion  Scaption    Wrist AROM 3x10 AROM HEP  Flexion/extension   Tendon gliding 3x10 AROM Not completed   Gripping 2-3 mins Moore Completed     Pullys  2 mins each AAROM  Flexion  Scaption    Shoulder extension 3x10 yellow Completed   Tricep extension  3x10 yellow Completed    Rows  3x10 yellow Completed    Serratus anterior slide 3x10 AROM Competed    Flexbar  Wrist Flexion/Ext  Wrist pronation  Wrist supination  Wrist ulnar deviation  Wrist radial dev 3x10 red Completed   Pulleys  2x2 mins AROM Completed  Flexion/scaption    UBE 2x2 mins AROM  Completed  Forward/backward    Other:      Treatment Charges: Mins Units   []  Modalities:      []  Ultrasound     [x]  Ther Exercise 40 3   [x]  Manual Therapy 15 1   []  Ther Activities     []  Orthotic fit/train     []  Orthotic recheck     []  Other     Total Treatment time 55 mins 4       Assessment: [x] Progressing toward goals. Began session w/ UBE and pulleys to warm-up. Completed IASTM to medial scar and crump scar. Pt tolerated IASTM well w/out complaint. Cont AROM and light strengthening exercise for the R elbow, wrist and hand. Pt tolerated these well w/ min c/o soreness in the R elbow. Pt tolerated tx well on this date. [] No change. [] Other     [x] Patient would continue to benefit from skilled occupational therapy services in order to: Improve  functional /grasp, I with ADLS, ROM, Strength, Activity tolerance, and Complaint of pain in order to Ensure safe return to work, improve functional use of UE in ADL performance, decrease pain in UE for safe completion of ADLs, and return to PLOF      STG/LTG  Short Term Goals: (  8    Treatments)  Decrease Pain by 2 points on numeric pain scale  Increase AROM of R wrist extension by 15 degrees  Increase strength (pounds); will set when appropriate w/ sx protocol   Increase function:UE Functional Index Score 20 or more points to promote increased functional abilities  Scar will be soft and pliable with minimal tethering. Decrease Edema by .5cm  in R elbow and wrist  Pt will report ability to complete bathing w/ min assist   Pt will report ability to celine/doff clothing w/ min assist     Patient to be independent with home exercise program as demonstrated by performance with correct form without cues.      Long Term Goals: (  16  Treatments)  Decrease Pain by 2 points on numeric pain scale  Increase AROM of R wrist extension by 15 degrees  Increase strength (pounds); will set when appropriate w/ sx protocol   Increase function:UE Functional Index Score 20 or more points to promote increased functional abilities  Scar will be soft and pliable with minimal tethering.  Decrease Edema by .5cm  in R elbow and wrist  Pt will report ability to complete bathing independently  Pt will report ability to celine/doff clothing independently      Pt. Education:  [] Yes  [x] No  [] Reviewed Prior HEP/Ed  Method of Education: [] Verbal  [] Demo  [] Written  Re:  Comprehension of Education:  [] Verbalizes understanding.  [] Demonstrates understanding.  [] Needs review.  [] Demonstrates/verbalizes HEP/Ed previously given.      Plan: [x] Continue current frequency toward short and long term goals.  [x] Specific Instructions for subsequent treatments: cont to progress strengthening    [] Other:       Time In: 1500  Time Out: 1555        Electronically signed by:  Angus Morales OTR/IZZY

## 2023-01-23 ENCOUNTER — HOSPITAL ENCOUNTER (OUTPATIENT)
Dept: OCCUPATIONAL THERAPY | Facility: CLINIC | Age: 33
Setting detail: THERAPIES SERIES
Discharge: HOME | End: 2023-01-23
Payer: OTHER GOVERNMENT

## 2023-01-23 PROCEDURE — 97110 THERAPEUTIC EXERCISES: CPT

## 2023-01-23 PROCEDURE — 97140 MANUAL THERAPY 1/> REGIONS: CPT

## 2023-01-23 NOTE — FLOWSHEET NOTE
[] [facility]       Occupational Therapy            1st floor       [address] [place], [place]         Phone: [phone]       Fax: [phone] [x] [facility] Occupational Therapy  [address] [place] [address]  Phone: [phone]  Fax: [phone]     Occupational Therapy Daily Treatment Note    Date:  2023  Patient Name:  Olga Galeazzi    :  1990  MRN: [id number]  Referring Provider:  ALEX Noguera CNP  Insurance: Other Caryn Celis for 15 visits  Medical Diagnosis: Right carpal tunnel syndrome (G56.01), Cubital canal compression syndrome, right (G56.21), Post-op pain (G89.18)  Rehab Codes: spasms of muscle other M62.838, , pain in right upper arm M79.621,, pain in right forearm M79.631,, or pain in right hand M79.641,  Onset Date: 22               Next Dr. [address]: 23  Visit# / total visits: 5/15; Progress note for Medicare patient due at visit 8-9    Cancels/No Shows: 0/1      Subjective:    Pain:  [x] Yes  [] No Location: over scar on R hand and R forearm  Pain Rating: (0-10 scale) 3/10  Pain altered Tx:  [x] No  [] Yes  Action:  Pt Comments: pt reports she has been getting shooting pn in her surgical arm.  She states that she tried to clean out her fish tank which caused more pn.     Objective:  Modalities:  Precautions:  Exercises:  Exercise Reps/Time Weight/Level Comments   Elbow AROM  3x10 AROM HEP  Flexion/extension   Pronation/supination 3x10 2lbs Completed   Wall wash 3x10 AAROM Completed  Flexion  Scaption    Wrist AROM 3x10 AROM HEP  Flexion/extension   Tendon gliding 3x10 AROM Not completed   Gripping 2-3 mins Moore Completed     Pullys  2 mins each AAROM  Flexion  Scaption    Shoulder extension 3x10 Red Completed   Tricep extension  3x10 Red Completed    Rows  3x10 Red Completed    Serratus anterior slide 3x10 AROM Competed    Flexbar  Wrist Flexion/Ext  Wrist pronation  Wrist supination  Wrist ulnar deviation  Wrist radial dev 3x10 red Not Completed   Pulleys  2x2 mins AROM Completed  Flexion/scaption    UBE 2x2 mins AROM  Completed  Forward/backward    Bicep curl  1x20 Red Completed    Other:      Treatment Charges: Mins Units   [x]  Modalities: Ice 8    []  Ultrasound     [x]  Ther Exercise 32 2   [x]  Manual Therapy 15 1   []  Ther Activities     []  Orthotic fit/train     []  Orthotic recheck     []  Other     Total Treatment time 55 mins 3       Assessment: [x] Progressing toward goals. Pt is concerned w/ shooting pn in her R UE along her ulnar nerve. Pt advised that it is normal as her nerve is still recovery from time being compressed for a long period of time. Completed soft tissue mobilization to R forearm and bicep and completed scar massage over R elbow and hand scars. Pt tolerated tx well on this date. [] No change. [] Other     [x] Patient would continue to benefit from skilled occupational therapy services in order to: Improve  functional /grasp, I with ADLS, ROM, Strength, Activity tolerance, and Complaint of pain in order to Ensure safe return to work, improve functional use of UE in ADL performance, decrease pain in UE for safe completion of ADLs, and return to PLOF      STG/LTG  Short Term Goals: (  8    Treatments)  Decrease Pain by 2 points on numeric pain scale  Increase AROM of R wrist extension by 15 degrees  Increase strength (pounds); will set when appropriate w/ sx protocol   Increase function:UE Functional Index Score 20 or more points to promote increased functional abilities  Scar will be soft and pliable with minimal tethering. Decrease Edema by .5cm  in R elbow and wrist  Pt will report ability to complete bathing w/ min assist   Pt will report ability to celine/doff clothing w/ min assist     Patient to be independent with home exercise program as demonstrated by performance with correct form without cues.      Long Term Goals: (  16  Treatments)  Decrease Pain by 2 points on numeric pain scale  Increase AROM of R wrist extension by 15 degrees  Increase strength (pounds); will set when appropriate w/ sx protocol   Increase function:UE Functional Index Score 20 or more points to promote increased functional abilities  Scar will be soft and pliable with minimal tethering. Decrease Edema by .5cm  in R elbow and wrist  Pt will report ability to complete bathing independently  Pt will report ability to celine/doff clothing independently      Pt. Education:  [] Yes  [x] No  [] Reviewed Prior HEP/Ed  Method of Education: [] Verbal  [] Demo  [] Written  Re:  Comprehension of Education:  [] Verbalizes understanding. [] Demonstrates understanding. [] Needs review. [] Demonstrates/verbalizes HEP/Ed previously given. Plan: [x] Continue current frequency toward short and long term goals.   [x] Specific Instructions for subsequent treatments: cont to progress strengthening    [] Other:       Time In: 1500  Time Out: 3076        Electronically signed by:  BIPIN Ziegler/IZZY

## 2023-01-25 ENCOUNTER — TELEMEDICINE (OUTPATIENT)
Dept: NEUROSURGERY | Age: 33
End: 2023-01-25

## 2023-01-25 DIAGNOSIS — G56.01 RIGHT CARPAL TUNNEL SYNDROME: Primary | ICD-10-CM

## 2023-01-25 DIAGNOSIS — G56.21 GUYON SYNDROME, RIGHT: ICD-10-CM

## 2023-01-25 DIAGNOSIS — G56.21 CUBITAL CANAL COMPRESSION SYNDROME, RIGHT: ICD-10-CM

## 2023-01-25 PROCEDURE — 99024 POSTOP FOLLOW-UP VISIT: CPT | Performed by: NEUROLOGICAL SURGERY

## 2023-01-25 NOTE — LETTER
[address] [facility], [address] [facility], [address] [place] [address]  Phone: [phone]  Fax: [address] [place] [address] Drive, DO        January 25, 2023     Patient: Ghassan Gordon   YOB: 1990   Date of Visit: 1/25/2023       To Whom It May Concern: It is my medical opinion that Ghassan Gordon may return to work on february 23, 2023. .    If you have any questions or concerns, please don't hesitate to call.     Sincerely,        Merline Collie, DO

## 2023-01-25 NOTE — PROGRESS NOTES
2023    TELEHEALTH EVALUATION -- Audio/Visual (During [facility]CYS[facility]-97 public health emergency)    HPI:    Ti Kaur (:  1990) has requested an audio/video evaluation for the following concern(s):    Doing well overall with resolution of numbness. Main complaints are some stiffness in the elbow, persistent pain at medial epicondyle & intermittent spasm of biceps, forearm and pincers. Still having some spasmodic events but have significant decrease in intensity and frequency occurring approximately every 3 days and lasting minutes to 1 hour. States that she has mainly difficulty in terms of elbow flexion when she grabs something and brings up toward herself. Has questions of her ability return to work at this point. Review of Systems    Prior to Visit Medications    Medication Sig Taking? Authorizing Provider   gabapentin (NEURONTIN) 300 MG capsule Take 1 capsule by mouth 3 times daily for 30 days. Intended supply: 90 days  [facility], APRN - CNP   ondansetron (ZOFRAN-ODT) 4 MG disintegrating tablet Take 1 tablet by mouth every 8 hours as needed for Nausea or Vomiting  William Shore DO   acetaminophen (TYLENOL) 500 MG tablet Take 2 tablets by mouth every 8 hours as needed for Pain  Kaitlin Cartwright,    Elastic Bandages & Supports (WRIST SPLINT/COCK-UP/RIGHT SM) MISC 1 each by Does not apply route nightly as needed (wrist pain)  Kaitlin Cartwright DO   amitriptyline (ELAVIL) 25 MG tablet Take 1 tablet by mouth nightly  Salvatore Alfredo MD       Social History     Tobacco Use    Smoking status: Former     Packs/day: 0.50     Years: 17.00     Pack years: 8.50     Types: Cigarettes     Start date:      Quit date: 2020     Years since quitting: 3.0    Smokeless tobacco: Former     Quit date: 2020   Vaping Use    Vaping Use: Former    Quit date: 3/25/2020   Substance Use Topics    Alcohol use: Yes     Comment: \"5-8 drinks on a holiday. 1 or 2 drinks once a week. \"    Drug use: Yes     Types: Marijuana (Weed)     Comment: Edibles - \"One every couple days. \"  CBD and THC. Allergies   Allergen Reactions    Other Hives     Paprika     Turmeric Hives    Codeine Other (See Comments)     Per genetic testing she has found that she over-metabolizes medication     Tylenol [Acetaminophen] Other (See Comments)     Per genetic testing she has found that she over-metabolizes medication     Vicodin [Hydrocodone-Acetaminophen] Other (See Comments)     Per genetic testing she has found that she over-metabolizes medication    ,   Past Medical History:   Diagnosis Date    Anxiety     Attention-deficit hyperactivity disorder 06/04/2021    Chronic low back pain     hx bulgin disc, used to see pain mgmt in [address]    COVID-19 01/2022    Headache, nausea, sinus congestion, achey, fever, chills  X1 week. Depression     Endometriosis     Fibromyalgia 02/05/2020    Hx lyrica, no longer, also no longer taking Gabapentin, but still keeps on med list    Gastroesophageal reflux disease without esophagitis 04/25/2021    HPV (human papilloma virus) anogenital infection     Hyperhidrosis     PTSD (post-traumatic stress disorder)     Under care of team 11/18/2022    NEUROLOGY - DR. MOREJON - last visit 11/2022    Under care of team 11/18/2022    NEUROSURGERY - DR. ANDERSON - last visit 10/2022    Wears contact lenses 11/18/2022    Wears glasses 11/18/2022    Wellness examination 11/18/2022    PCP - DR. MALLORY - last viait - 8/2022   ,   Past Surgical History:   Procedure Laterality Date    CARPAL TUNNEL RELEASE Right 11/29/2022    ULNAR NERVE TRANSPOSITION AND GUYON, CARPAL TUNNEL RELEASE    CARPAL TUNNEL RELEASE Right 11/29/2022    ULNAR NERVE TRANSPOSITION AND GUYON, CARPAL TUNNEL RELEASE. performed by Behzad Phoenix DO at [address]  2008    COLPOSCOPY      2010, 2011 x 2     ENDOMETRIAL BIOPSY  01/31/2020    results were WNL     TONSILLECTOMY      WISDOM TOOTH EXTRACTION     ,   Social History     Tobacco Use Smoking status: Former     Packs/day: 0.50     Years: 17.00     Pack years: 8.50     Types: Cigarettes     Start date: 2002     Quit date: 2020     Years since quitting: 3.0    Smokeless tobacco: Former     Quit date: 03/2020   Vaping Use    Vaping Use: Former    Quit date: 3/25/2020   Substance Use Topics    Alcohol use: Yes     Comment: \"5-8 drinks on a holiday. 1 or 2 drinks once a week. \"    Drug use: Yes     Types: Marijuana Magaly Rosales     Comment: Edibles - \"One every couple days. \"  CBD and THC. ,   Family History   Problem Relation Age of Onset    Depression Mother     Anxiety Disorder Mother     Depression Father     Ovarian Cancer Maternal Aunt         great aunt     ADHD Brother     Other Brother         autism     Osteoarthritis Maternal Grandmother     Cancer Maternal Grandfather         skin    COPD Maternal Grandfather     Lung Cancer Maternal Grandfather     ADHD Brother     Depression Brother     No Known Problems Paternal Grandmother     Colon Cancer Paternal Grandfather         COD    Diabetes type 2  Maternal Aunt     ADHD Son     Other Son         ODD        PHYSICAL EXAMINATION:  [ INSTRUCTIONS:  \"[x]\" Indicates a positive item  \"[]\" Indicates a negative item  -- DELETE ALL ITEMS NOT EXAMINED]  Vital Signs: (As obtained by patient/caregiver or practitioner observation)    Blood pressure-  Heart rate-    Respiratory rate-    Temperature-  Pulse oximetry-     Constitutional: [x] Appears well-developed and well-nourished [x] No apparent distress      [] Abnormal-   Mental status  [x] Alert and awake  [x] Oriented to person/place/time [x]Able to follow commands      Eyes:  EOM    [x]  Normal  [] Abnormal-  Sclera  [x]  Normal  [] Abnormal -         Discharge [x]  None visible  [] Abnormal -    HENT:   [x] Normocephalic, atraumatic.   [] Abnormal   [x] Mouth/Throat: Mucous membranes are moist.     External Ears [x] Normal  [] Abnormal-     Neck: [x] No visualized mass     Pulmonary/Chest: [x] Respiratory effort normal.  [x] No visualized signs of difficulty breathing or respiratory distress        [] Abnormal-      Musculoskeletal:   [x] Normal gait with no signs of ataxia         [x] Normal range of motion of neck        [] Abnormal-       Neurological:        [x] No Facial Asymmetry (Cranial nerve 7 motor function) (limited exam to video visit)          [x] No gaze palsy        [] Abnormal-         Skin:        [x] No significant exanthematous lesions or discoloration noted on facial skin         [] Abnormal-            Psychiatric:       [x] Normal Affect [x] No Hallucinations        [] Abnormal-     Other pertinent observable physical exam findings-     ASSESSMENT/PLAN:  Ulnar 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 Has a complete resolution of all symptoms with exception of some spasm as well as pain in the medial epicondyle. Counseled on continuation of range of motion exercises, ulnar glides, usage of ice heat as needed and massage to the area in question. I believe she will likely be able to return to work in approximately 4 to 5 weeks and counseled on time-limited symptoms that she is having. We will see her back in follow-up in 2 months    Yeison Hyde, was evaluated through a synchronous (real-time) audio-video encounter. The patient (or guardian if applicable) is aware that this is a billable service, which includes applicable co-pays. This Virtual Visit was conducted with patient's (and/or legal guardian's) consent. The visit was conducted pursuant to the emergency declaration under the [facility], [address] [place] [address] [facility] authority and the [employer] and [employer] General Act. Patient identification was verified, and a caregiver was present when appropriate. The patient was located at Home: [address] [place], [place] [address]ab Derian.    Provider was located at [facility] ([address] [place] [address]): [address] [place] [address],  [address]  [facility] [address] [facility], Tor[address] Garcia [address]. Total time spent on this encounter: Not billed by time    --Abdiaziz Meadows DO on 1/25/2023 at 9:58 AM    An electronic signature was used to authenticate this note.

## 2023-01-26 ENCOUNTER — TELEPHONE (OUTPATIENT)
Dept: NEUROSURGERY | Age: 33
End: 2023-01-26

## 2023-01-26 NOTE — TELEPHONE ENCOUNTER
[employer] Leave paperwork was received. Blank copy of form has been scanned. Patient notified it will take up to 7-10 business days for completion .

## 2023-01-27 ENCOUNTER — HOSPITAL ENCOUNTER (OUTPATIENT)
Dept: OCCUPATIONAL THERAPY | Facility: CLINIC | Age: 33
Setting detail: THERAPIES SERIES
Discharge: HOME | End: 2023-01-27
Payer: OTHER GOVERNMENT

## 2023-01-27 PROCEDURE — 97140 MANUAL THERAPY 1/> REGIONS: CPT

## 2023-01-27 PROCEDURE — 97110 THERAPEUTIC EXERCISES: CPT

## 2023-01-27 NOTE — FLOWSHEET NOTE
[] [facility]       Occupational Therapy            1st floor       [address], [place]         Phone: [phone]       Fax: [phone] [x] [facility]  [address], [place]  Phone: [phone]  Fax: [phone]     Occupational Therapy Daily Treatment Note    Date:  2023  Patient Name:  Marj Hernandez    :  1990  MRN: [id number]  Referring Provider:  ALEX Nova CNP  Insurance: Other [employer] for 15 visits  Medical Diagnosis: Right carpal tunnel syndrome (G56.01), Cubital canal compression syndrome, right (G56.21), Post-op pain (G89.18)  Rehab Codes: spasms of muscle other M62.838, , pain in right upper arm M79.621,, pain in right forearm M79.631,, or pain in right hand M79.641,  Onset Date: 22               Next  [address]: 23  Visit# / total visits: 6/15; Progress note for Medicare patient due at visit 8-9    Cancels/No Shows: 0/1      Subjective:    Pain:  [x] Yes  [] No Location: L elbow Pain Rating: (0-10 scale) 2/10  Pain altered Tx:  [x] No  [] Yes  Action:  Pt Comments: pt reports she had appointment w/ referring MD. He said she could return to work the 3rd week of February. Per subjective, MD stated she may need to see orthopedic MD as her pn is more orthopedic in nature than neuro.       Objective:  Modalities:  Precautions:  Exercises:  Exercise Reps/Time Weight/Level Comments Completed    Elbow AROM  3x10 AROM HEP  Flexion/extension -   Pronation/supination 3x10 2lbs  X   Wall wash 3x10 AAROM Flexion  Scaption  X   Wrist AROM 3x10 AROM HEP  Flexion/extension -   Tendon gliding 3x10 AROM  -   Gripping 2-3 mins Moore    X   Pullys  2 mins each AAROM  Flexion  Scaption  X   Shoulder extension 3x10 Red  X   Tricep extension  3x10 Red  X   Rows  3x10 Red  X   Serratus anterior slide 3x10 AROM  X   Flexbar  Wrist Flexion/Ext  Wrist pronation  Wrist supination  Wrist ulnar deviation  Wrist radial dev 3x10 red  X   UBE 2x3 mins AROM  Forward/backward  X   Bicep curl  1x20 Red  X   IR/ER  3x10 red  X   Other:    Treatment Charges: Mins Units   []  Modalities:      []  Ultrasound     [x]  Ther Exercise 35 2   [x]  Manual Therapy 15 1   []  Ther Activities     []  Orthotic fit/train     []  Orthotic recheck     []  Other     Total Treatment time 50 mins 3       Assessment: [x] Progressing toward goals. Pt had f/u w/ MD on 1/25/23. Per MD note she is still having pn in medial elbow and muscle spasms in R forearm. Pt to return to work 2/2/23. Pt is steadily improving throughout therapy. She continues to report muscular soreness in elbow flexors. Completed IASTM over medial R elbow and elbow flexors. Pt tolerated well w/ min petechiae noted. Pt completed elbow strengthening exercises w/ min c/o soreness in R elbow. Pt tolerated tx well on this date. [] No change. [] Other     [x] Patient would continue to benefit from skilled occupational therapy services in order to: Improve  functional /grasp, I with ADLS, ROM, Strength, Activity tolerance, and Complaint of pain in order to Ensure safe return to work, improve functional use of UE in ADL performance, decrease pain in UE for safe completion of ADLs, and return to PLOF      STG/LTG  Short Term Goals: (  8    Treatments)  Decrease Pain by 2 points on numeric pain scale  Increase AROM of R wrist extension by 15 degrees  Increase strength (pounds); will set when appropriate w/ sx protocol   Increase function:UE Functional Index Score 20 or more points to promote increased functional abilities  Scar will be soft and pliable with minimal tethering. Decrease Edema by .5cm  in R elbow and wrist  Pt will report ability to complete bathing w/ min assist   Pt will report ability to celine/doff clothing w/ min assist     Patient to be independent with home exercise program as demonstrated by performance with correct form without cues.      Long Term Goals: (  16 Treatments)  Decrease Pain by 2 points on numeric pain scale  Increase AROM of R wrist extension by 15 degrees  Increase strength (pounds); will set when appropriate w/ sx protocol   Increase function:UE Functional Index Score 20 or more points to promote increased functional abilities  Scar will be soft and pliable with minimal tethering. Decrease Edema by .5cm  in R elbow and wrist  Pt will report ability to complete bathing independently  Pt will report ability to celine/doff clothing independently      Pt. Education:  [] Yes  [x] No  [] Reviewed Prior HEP/Ed  Method of Education: [] Verbal  [] Demo  [] Written  Re:  Comprehension of Education:  [] Verbalizes understanding. [] Demonstrates understanding. [] Needs review. [] Demonstrates/verbalizes HEP/Ed previously given. Plan: [x] Continue current frequency toward short and long term goals.   [x] Specific Instructions for subsequent treatments: cont to progress strengthening    [] Other:       Time In: 1300  Time Out: 1350       Electronically signed by:  BIPIN Yanez/IZZY

## 2023-02-02 DIAGNOSIS — G89.18 POST-OP PAIN: ICD-10-CM

## 2023-02-02 DIAGNOSIS — G56.21 CUBITAL CANAL COMPRESSION SYNDROME, RIGHT: ICD-10-CM

## 2023-02-02 DIAGNOSIS — G56.01 RIGHT CARPAL TUNNEL SYNDROME: ICD-10-CM

## 2023-02-02 RX ORDER — GABAPENTIN 300 MG/1
CAPSULE ORAL
Qty: 90 CAPSULE | Refills: 0 | OUTPATIENT
Start: 2023-02-02

## 2023-02-03 ENCOUNTER — HOSPITAL ENCOUNTER (OUTPATIENT)
Dept: OCCUPATIONAL THERAPY | Facility: CLINIC | Age: 33
Setting detail: THERAPIES SERIES
Discharge: HOME | End: 2023-02-03
Payer: OTHER GOVERNMENT

## 2023-02-03 PROCEDURE — 97110 THERAPEUTIC EXERCISES: CPT

## 2023-02-03 PROCEDURE — 97140 MANUAL THERAPY 1/> REGIONS: CPT

## 2023-02-03 NOTE — FLOWSHEET NOTE
[] [facility]       Occupational Therapy            1st floor       [address], [place]         Phone: [phone]       Fax: [phone] [x] [facility]  [address], [place]  Phone: [phone]  Fax: [phone]     Occupational Therapy Daily Treatment Note    Date:  2/3/2023  Patient Name:  Ibis Cobb    :  1990  MRN: [id number]  Referring Provider:  ALEX Casillas - CNP  Insurance: Other [employer] for 15 visits  Medical Diagnosis: Right carpal tunnel syndrome (G56.01), Cubital canal compression syndrome, right (G56.21), Post-op pain (G89.18)  Rehab Codes: spasms of muscle other M62.838, , pain in right upper arm M79.621,, pain in right forearm M79.631,, or pain in right hand M79.641,  Onset Date: 22               Next Dr. Iram Molina: 23  Visit# / total visits: 7/15; Progress note for Medicare patient due at visit 8-9    Cancels/No Shows: 0/1      Subjective:    Pain:  [x] Yes  [] No Location: L elbow Pain Rating: (0-10 scale) 2/10  Pain altered Tx:  [x] No  [] Yes  Action:  Pt Comments: pt reports she has cont to have shooting nerve pn in her R UE while completing ADLs/IADLs. Pt reports she cont sensitive w/ scar on volar R hand.  Pt states she has had good days and bad days w/ her nerve pn and requested more Gabapentin  for nerve pn.    Objective:  Modalities:  Precautions:  Exercises:  Exercise Reps/Time Weight/Level Comments Completed    Elbow AROM  3x10 AROM HEP  Flexion/extension -   Pronation/supination 3x10 2lbs  X   Wall wash 3x10 AAROM Flexion  Scaption  X   Wrist AROM 3x10 AROM HEP  Flexion/extension -   Tendon gliding 3x10 AROM  -   Gripping 2-3 mins Moore    X   Pullys  2 mins each AAROM  Flexion  Scaption  X   Shoulder extension 3x10 Red  X   Tricep extension  3x10 Red  X   Rows  3x10 Red  X   Serratus anterior slide 3x10 AROM  -   Flexbar  Wrist Flexion/Ext  Wrist pronation  Wrist supination  Wrist ulnar deviation  Wrist radial dev 3x10 red  -   UBE 2x3 mins AROM  Forward/backward  X   Bicep curl  1x20 Red  X   IR/ER  3x10 red  X   Rotating curls 3x10 3lbs  X          Other:    Treatment Charges: Mins Units   []  Modalities:      []  Ultrasound     [x]  Ther Exercise 35 2   [x]  Manual Therapy 15 1   []  Ther Activities     []  Orthotic fit/train     []  Orthotic recheck     []  Other     Total Treatment time 45 mins        Assessment: [x] Progressing toward goals. Pt arrived to OT reporting she has continued difficulty completing ADLs/IADLs d/u nerve pn in her R UE. Pt cont to report muscular soreness after completing ADLs/IADLs. Cont w/ UE conditioning w/ UBE. Completed soft tissue mobilization to the R forearm, bicep and bicep tendon. Completed postural strengthening and bicep strengthening. Pt tolerated all tx well w/ min c/o soreness in R bicep. [] No change. [] Other     [x] Patient would continue to benefit from skilled occupational therapy services in order to: Improve  functional /grasp, I with ADLS, ROM, Strength, Activity tolerance, and Complaint of pain in order to Ensure safe return to work, improve functional use of UE in ADL performance, decrease pain in UE for safe completion of ADLs, and return to PLOF      STG/LTG  Short Term Goals: (  8    Treatments)  Decrease Pain by 2 points on numeric pain scale  Increase AROM of R wrist extension by 15 degrees  Increase strength (pounds); will set when appropriate w/ sx protocol   Increase function:UE Functional Index Score 20 or more points to promote increased functional abilities  Scar will be soft and pliable with minimal tethering. Decrease Edema by .5cm  in R elbow and wrist  Pt will report ability to complete bathing w/ min assist   Pt will report ability to celine/doff clothing w/ min assist     Patient to be independent with home exercise program as demonstrated by performance with correct form without cues.      Long Term Goals: (  16  Treatments)  Decrease Pain by 2 points on numeric pain scale  Increase AROM of R wrist extension by 15 degrees  Increase strength (pounds); will set when appropriate w/ sx protocol   Increase function:UE Functional Index Score 20 or more points to promote increased functional abilities  Scar will be soft and pliable with minimal tethering. Decrease Edema by .5cm  in R elbow and wrist  Pt will report ability to complete bathing independently  Pt will report ability to celine/doff clothing independently      Pt. Education:  [] Yes  [x] No  [] Reviewed Prior HEP/Ed  Method of Education: [] Verbal  [] Demo  [] Written  Re:  Comprehension of Education:  [] Verbalizes understanding. [] Demonstrates understanding. [] Needs review. [] Demonstrates/verbalizes HEP/Ed previously given. Plan: [x] Continue current frequency toward short and long term goals.   [x] Specific Instructions for subsequent treatments: cont to progress strengthening    [] Other:       Time In: 1300  Time Out: 9682       Electronically signed by:  BIPIN Vasquez/IZZY

## 2023-02-06 ENCOUNTER — HOSPITAL ENCOUNTER (OUTPATIENT)
Dept: OCCUPATIONAL THERAPY | Facility: CLINIC | Age: 33
Setting detail: THERAPIES SERIES
Discharge: HOME | End: 2023-02-06
Payer: OTHER GOVERNMENT

## 2023-02-06 PROCEDURE — 97140 MANUAL THERAPY 1/> REGIONS: CPT

## 2023-02-06 PROCEDURE — 97110 THERAPEUTIC EXERCISES: CPT

## 2023-02-06 RX ORDER — GABAPENTIN 300 MG/1
300 CAPSULE ORAL 3 TIMES DAILY
Qty: 90 CAPSULE | Refills: 0 | OUTPATIENT
Start: 2023-02-06 | End: 2023-03-08

## 2023-02-06 NOTE — FLOWSHEET NOTE
[] [facility]       Occupational Therapy            1st floor       [address], [place]         Phone: [phone]       Fax: [phone] [x] [facility]  [address], [place]  Phone: [phone]  Fax: [phone]     Occupational Therapy Daily Treatment Note    Date:  2023  Patient Name:  Rl Montes    :  1990  MRN: [id number]  Referring Provider:  ALEX Hernandez CNP  Insurance: Other [facility] for 15 visits  Medical Diagnosis: Right carpal tunnel syndrome (G56.01), Cubital canal compression syndrome, right (G56.21), Post-op pain (G89.18)  Rehab Codes: spasms of muscle other M62.838, , pain in right upper arm M79.621,, pain in right forearm M79.631,, or pain in right hand M79.641,  Onset Date: 22               Next Dr. Aspen Cruz: 23  Visit# / total visits: 8/15; Progress note for Medicare patient due at visit 8-9    Cancels/No Shows: 0/1      Subjective:    Pain:  [x] Yes  [] No Location: L elbow Pain Rating: (0-10 scale) 2/10  Pain altered Tx:  [x] No  [] Yes  Action:  Pt Comments: pt reports she is doing better today. She states that she slept on there R UE wrong last night so she is a little sore.  Pt states after previous session but her bicep is doing much better and she was able to bend her elbow w/out pn.     Objective:  Modalities:  Precautions:  Exercises:  Exercise Reps/Time Weight/Level Comments Completed    Elbow AROM  3x10 AROM HEP  Flexion/extension -   Pronation/supination 3x10 2lbs  -   Wall wash 3x10 AAROM Flexion  Scaption  -   Wrist AROM 3x10 AROM HEP  Flexion/extension -   Tendon gliding 3x10 AROM  -   Gripping 2-3 mins Tan    -   Pullys  2 mins each AAROM  Flexion  Scaption  -   Shoulder extension 3x10 Red  X   Tricep extension  3x10 Red  X   Rows  3x10 Red  X   Serratus anterior slide 3x10 AROM  X   Flexbar  Wrist Flexion/Ext  Wrist pronation  Wrist supination  Wrist ulnar deviation  Wrist radial dev 3x10 red  -   UBE 2x3 mins AROM  Forward/backward  X   Bicep curl  1x20 Red  X   IR/ER  3x10 red  X   Rotating curls 3x10 3lbs  X   Power web 3x10 yellow  X   Other:    Treatment Charges: Mins Units   []  Modalities:      []  Ultrasound     [x]  Ther Exercise 35 2   [x]  Manual Therapy 10 1   []  Ther Activities     []  Orthotic fit/train     []  Orthotic recheck     []  Other     Total Treatment time 45 mins        Assessment: [x] Progressing toward goals. Pt is progressing well on this date. Cont w/ soft tissue mobilization to the R bicep and brachialis. Completed scar massage to volar R hand. Upgraded serratus anterior wall slides w/ theraband at the hands. Cont w/ UE strengthening an postural strengthening. Pt tolerated it well. Completed  strengthening w/ power web and metal gripper. Pt tolerated tx well on this date. [] No change. [] Other     [x] Patient would continue to benefit from skilled occupational therapy services in order to: Improve  functional /grasp, I with ADLS, ROM, Strength, Activity tolerance, and Complaint of pain in order to Ensure safe return to work, improve functional use of UE in ADL performance, decrease pain in UE for safe completion of ADLs, and return to PLOF      STG/LTG  Short Term Goals: (  8    Treatments)  Decrease Pain by 2 points on numeric pain scale  Increase AROM of R wrist extension by 15 degrees  Increase strength (pounds); will set when appropriate w/ sx protocol   Increase function:UE Functional Index Score 20 or more points to promote increased functional abilities  Scar will be soft and pliable with minimal tethering. Decrease Edema by .5cm  in R elbow and wrist  Pt will report ability to complete bathing w/ min assist   Pt will report ability to celine/doff clothing w/ min assist     Patient to be independent with home exercise program as demonstrated by performance with correct form without cues.      Long Term Goals: (  16 Treatments)  Decrease Pain by 2 points on numeric pain scale  Increase AROM of R wrist extension by 15 degrees  Increase strength (pounds); will set when appropriate w/ sx protocol   Increase function:UE Functional Index Score 20 or more points to promote increased functional abilities  Scar will be soft and pliable with minimal tethering. Decrease Edema by .5cm  in R elbow and wrist  Pt will report ability to complete bathing independently  Pt will report ability to celine/doff clothing independently      Pt. Education:  [] Yes  [x] No  [] Reviewed Prior HEP/Ed  Method of Education: [] Verbal  [] Demo  [] Written  Re:  Comprehension of Education:  [] Verbalizes understanding. [] Demonstrates understanding. [] Needs review. [] Demonstrates/verbalizes HEP/Ed previously given. Plan: [x] Continue current frequency toward short and long term goals.   [x] Specific Instructions for subsequent treatments: cont to progress strengthening    [] Other:       Time In: 3475  Time Out: 7154      Electronically signed by:  BIPIN Masters/IZZY

## 2023-02-08 ENCOUNTER — HOSPITAL ENCOUNTER (OUTPATIENT)
Dept: OCCUPATIONAL THERAPY | Facility: CLINIC | Age: 33
Setting detail: THERAPIES SERIES
Discharge: HOME | End: 2023-02-08
Payer: OTHER GOVERNMENT

## 2023-02-08 PROCEDURE — 97110 THERAPEUTIC EXERCISES: CPT

## 2023-02-08 PROCEDURE — 97140 MANUAL THERAPY 1/> REGIONS: CPT

## 2023-02-08 NOTE — FLOWSHEET NOTE
[] [facility]       Occupational Therapy            1st floor       [address] [place], [place]         Phone: [phone]       Fax: [phone] [x] [facility]  [address] [place] [address]  Phone: [phone]  Fax: [phone]     Occupational Therapy Daily Treatment Note    Date:  2023  Patient Name:  Johnathan Almanzar    :  1990  MRN: [id number]  Referring Provider:  ALEX Ferguson - CNP  Insurance: Other [employer] for 15 visits  Medical Diagnosis: Right carpal tunnel syndrome (G56.01), Cubital canal compression syndrome, right (G56.21), Post-op pain (G89.18)  Rehab Codes: spasms of muscle other M62.838, , pain in right upper arm M79.621,, pain in right forearm M79.631,, or pain in right hand M79.641,  Onset Date: 22               Next Dr. Lugo Kurt: 23  Visit# / total visits: 9/15; Progress note for Medicare patient due at visit 8-9    Cancels/No Shows: 0/1      Subjective:    Pain:  [x] Yes  [] No Location: L elbow Pain Rating: (0-10 scale) 2/10  Pain altered Tx:  [x] No  [] Yes  Action:  Pt Comments: See PN dated 23 for details    Objective:  Modalities:  Precautions:  Exercises:  Exercise Reps/Time Weight/Level Comments Completed    Elbow AROM  3x10 AROM HEP  Flexion/extension -   Pronation/supination 3x10 2lbs  -   Wall wash 3x10 AAROM Flexion  Scaption  -   Wrist AROM 3x10 AROM HEP  Flexion/extension -   Tendon gliding 3x10 AROM  -   Gripping 2-3 mins Tan    -   Pullys  2 mins each AAROM  Flexion  Scaption  -   Shoulder extension 3x10 Red  X   Tricep extension  3x10 Red  -   Rows  3x10 Red  X   Serratus anterior slide 3x10 AROM  -   Flexbar  Wrist Flexion/Ext  Wrist pronation  Wrist supination  Wrist ulnar deviation  Wrist radial dev 3x10 red  -   UBE 2x3 mins AROM  Forward/backward  X   Bicep curl  1x20 Red  X   IR/ER  3x10 red  X   Rotating curls 3x10 3lbs  X   Power web 3x10 yellow  X   Metal gripper 3x10   X Other:    Treatment Charges: Mins Units   []  Modalities:      []  Ultrasound     [x]  Ther Exercise 35 2   [x]  Manual Therapy 10 1   []  Ther Activities     []  Orthotic fit/train     []  Orthotic recheck     []  Other     Total Treatment time 45 mins        Assessment: [x] Progressing toward goals. Formal measurements taken on this date for PN, see PN dated 2/8/23 for details. Pt arrived to OT c/o increased nerve pn over the past two days. Completed soft tissue mobilization to the R bicep, forearm and palm of R UE. Pt completed  and UE strengthening w/ remaining time available. Pt tolerated tx well w/ min c/o soreness in R forearm while completing bicep strengthening. [] No change. [] Other     [x] Patient would continue to benefit from skilled occupational therapy services in order to: Improve  functional /grasp, I with ADLS, ROM, Strength, Activity tolerance, and Complaint of pain in order to Ensure safe return to work, improve functional use of UE in ADL performance, decrease pain in UE for safe completion of ADLs, and return to PLOF      STG/LTG  Short Term Goals: (  8    Treatments)  Decrease Pain by 2 points on numeric pain scale Met  Increase AROM of R wrist extension by 15 degrees Met  Increase  strength by 10 lbs (set 2/8/23)  Increase pinch strength by 5lbs (set 2/8/23)  Increase function:UE Functional Index Score 20 or more points to promote increased functional abilities Met   Scar will be soft and pliable with minimal tethering. Met  Decrease Edema by .5cm  in R elbow and wrist Not Met  Pt will report ability to complete bathing w/ min assist  Met  Pt will report ability to celine/doff clothing w/ min assist   Met  Patient to be independent with home exercise program as demonstrated by performance with correct form without cues.  Met     Long Term Goals: (  15  Treatments)  Decrease Pain by 2 points on numeric pain scale   Increase AROM of R wrist extension by 10 degrees  Increase  strength by 10 lbs (set 2/8/23)  Increase pinch strength by 5lbs (set 2/8/23)  Increase function:UE Functional Index Score 20 or more points to promote increased functional abilities  Scar will be soft and pliable with minimal tethering. Decrease Edema by .5cm  in R elbow and wrist  Pt will report ability to complete bathing independently  Pt will report ability to celine/doff clothing independently      Pt. Education:  [] Yes  [x] No  [] Reviewed Prior HEP/Ed  Method of Education: [] Verbal  [] Demo  [] Written  Re:  Comprehension of Education:  [] Verbalizes understanding. [] Demonstrates understanding. [] Needs review. [] Demonstrates/verbalizes HEP/Ed previously given. Plan: [x] Continue current frequency toward short and long term goals.   [x] Specific Instructions for subsequent treatments: cont to progress strengthening    [] Other:       Time In: 8500  Time Out: 1430      Electronically signed by:  BIPIN Odell/IZZY

## 2023-02-08 NOTE — PROGRESS NOTES
[] [facility]  [address]  [phone]  F: [phone] [x] [facility]  [address] [place] [address]  Phone: [phone]  Fax: [phone]     Occupational Therapy Progress Note    Date: 2023      Patient: Griselda Crandall  : 1990  MRN: [id number]    Referring Provider:  ALEX Hamilton CNP  Insurance: [employer] for 15 visits  Medical Diagnosis: Right carpal tunnel syndrome (G56.01), Cubital canal compression syndrome, right (G56.21), Post-op pain (G89.18)  Rehab Codes: spasms of muscle other M62.838, , pain in right upper arm M79.621,, pain in right forearm M79.631,, or pain in right hand M79.641,  Onset Date: 22               Next Dr. [address]: 23  Visit# / total visits: 8/15; Progress note for Medicare patient due at visit 8-9                       Total visits attended: 9  Cancels/No shows: 0/1  Date range of services: 23 to 23    Subjective:  Pain:  [x] Yes  [] No  Location: R palm, volar forearm and elbow  Pain Rating: (0-10 scale) 2/10   Pain altered Tx:  [x] No  [] Yes  Action:  Comments: Pt reports she felt sore after previous session and had \"a lot of nerve pn\" in her R elbow. Pt reports she tried to complete her HEP yesterday which helped w/ her pn but did not alleviate it completely. Objective:  Test Measurements: 57/80 functionally impaired as measured with the Upper Extremity Functional Index Survey. 0-80 scale, with 80 = no Deficits  (The UEFI model does not provide any specific cut off points that could classify the upper limb disability degree, however, a minimal detectable change of 9 points is provided. This means that for improvement or deterioration to be considered, between two subsequent evaluations, the scores must differ by at least 9 points.   Function:      Edema    Right Left   Elbow Joint 27cm 26.5cm   7cm below joint 14.5cm 16cm   Wrist joint 15.5cm 15cm         UE ROM/MMT    Right Left MMT Right MMT Left   Shoulder           Flexion [facility] [place] WFL NT 5/5   Extension [facility] [place] WFL NT 5/5   Adduction [facility] [place] WFL NT 5/5   Internal Rotation [facility] [place] WFL NT 5/5   External Rotation [facility] [place] WFL NT 5/5   Elbow           Flexion [facility] [place] WFL NT 5/5   Extension [facility] [place] WFL NT 5/5   Pronation WFL WFL NT 5/5   Supination  WFL WFL NT 5/5   Wrist           Flexion 49 45 NT 5/5   Extension 53 (95%) 62 NT 5/5   Radial deviation WFL WFL NT 5/5   Ulnar deviation WFL WFL NT 5/5         COORDINATION  - Fine Motor (speed/dexterity)       Right in seconds Percentile Left in seconds Percentile   9 Hole Peg Test 28 seconds   20 seconds            STRENGTH                  Right   (pounds) Left   (pounds)    position 1 25 (50%) 50    position 2 32 (64%) 50   Lateral pinch 12 (70%) 17   2 point pinch 7 (70%) 10   3 jaw pinch 8 (61%) 13      Bilateral  strength is normally symmetrical or up to 10% stronger on the dominant extremity, depending on the individual's physically activity level. Assessment:  Short Term Goals: (  8    Treatments)  Decrease Pain by 2 points on numeric pain scale Met  Increase AROM of R wrist extension by 15 degrees Met  Increase  strength by 10 lbs (set 2/8/23)  Increase pinch strength by 5lbs (set 2/8/23)  Increase function:UE Functional Index Score 20 or more points to promote increased functional abilities Met   Scar will be soft and pliable with minimal tethering. Met  Decrease Edema by .5cm  in R elbow and wrist Not Met  Pt will report ability to complete bathing w/ min assist  Met  Pt will report ability to celine/doff clothing w/ min assist   Met  Patient to be independent with home exercise program as demonstrated by performance with correct form without cues.  Met    Long Term Goals: (  16  Treatments)  Decrease Pain by 2 points on numeric pain scale   Increase AROM of R wrist extension by 10 degrees  Increase  strength by 10 lbs (set 2/8/23)  Increase pinch strength by 5lbs (set 2/8/23)  Increase function:UE Functional Index Score 20 or more points to promote increased functional abilities  Scar will be soft and pliable with minimal tethering. Decrease Edema by .5cm  in R elbow and wrist  Pt will report ability to complete bathing independently  Pt will report ability to celine/doff clothing independently    Treatment Plan:   [x]  Therapeutic Exercise   00406              []  Iontophoresis: 4 mg/mL Dexamethasone Sodium Phosphate  mAmin  16973    []  Therapeutic Activity  96245  []  Vasopneumatic cold with compression  96218                []  ADL training  13020  []  Ultrasound        25149    []  Neuromuscular Re-education  50271  []  Electrical Stimulation Attended  39161    [x]  Manual Therapy  21068  Orthotic    []  Fit  47173     []  Train 28347    []  Instruction in HEP        Prosthetic    []  Fit 84524      []  Train 46923    []  Cognitive Interventions, (first 15 min 07471, subsequent 15 min 64074)  []  Cold/hotpack      []  Massage   48052             Patient Status: (requested frequency/duration)     [x] Continue per initial/current plan of care 1-2 times per week for 6 remaining visits. [] Additional visits necessary. Electronically signed by LORRAINE Rodrigues on 2/8/2023 at 1:29 PM      If you have any questions or concerns, please don't hesitate to call. Thank you for your referral.    Physician Signature:________________________________Date:__________________  By signing above or cosigning this note, I have reviewed this plan of care and certify a need for medically necessary rehabilitation services.      *PLEASE SIGN ABOVE AND FAX BACK ALL PAGES

## 2023-02-13 ENCOUNTER — HOSPITAL ENCOUNTER (OUTPATIENT)
Dept: OCCUPATIONAL THERAPY | Facility: CLINIC | Age: 33
Setting detail: THERAPIES SERIES
Discharge: HOME | End: 2023-02-13
Payer: OTHER GOVERNMENT

## 2023-02-13 PROCEDURE — 97110 THERAPEUTIC EXERCISES: CPT

## 2023-02-13 PROCEDURE — 97140 MANUAL THERAPY 1/> REGIONS: CPT

## 2023-02-13 NOTE — FLOWSHEET NOTE
[] [facility]       Occupational Therapy            1st floor       [address], [place]         Phone: [phone]       Fax: [phone] [x] [facility]  [address] [place] [address]  Phone: [phone]  Fax: [phone]     Occupational Therapy Daily Treatment Note    Date:  2023  Patient Name:  Ngiha Chang    :  1990  MRN: [id number]  Referring Provider:  ALEX Alvarado CNP  Insurance: [employer] for 15 visits  Medical Diagnosis: Right carpal tunnel syndrome (G56.01), Cubital canal compression syndrome, right (G56.21), Post-op pain (G89.18)  Rehab Codes: spasms of muscle other M62.838, , pain in right upper arm M79.621,, pain in right forearm M79.631,, or pain in right hand M79.641,  Onset Date: 22               Next Dr. Loyda Barlow: 23  Visit# / total visits: 10/15; Progress note for Medicare patient due at visit 8-9    Cancels/No Shows: 0/1      Subjective:    Pain:  [x] Yes  [] No Location: L elbow Pain Rating: (0-10 scale) 2/10  Pain altered Tx:  [x] No  [] Yes  Action:  Pt Comments: pt reports she had an increase in nerve pn over the weekend secondary to spending time in the car and vibrations caused her nerve pn to flare up.      Objective:  Modalities:  Precautions:  Exercises:  Exercise Reps/Time Weight/Level Comments Completed    Elbow AROM  3x10 AROM HEP  Flexion/extension -   Pronation/supination 3x10 2lbs  -   Wall wash 3x10 AAROM Flexion  Scaption  -   Wrist AROM 3x10 AROM HEP  Flexion/extension -   Tendon gliding 3x10 AROM  -   Gripping 2-3 mins Tan    -   Pullys  2 mins each AAROM  Flexion  Scaption  -   Shoulder extension 3x10 Green  X   Tricep extension  3x10 Red  -   Rows  3x10 Green  X   Serratus anterior slide 3x10 AROM  -   Flexbar  Wrist Flexion/Ext  Wrist pronation  Wrist supination  Wrist ulnar deviation  Wrist radial dev 3x10 red  -   UBE 2x3 mins AROM  Forward/backward  X   Bicep curl  1x20 Red  -   IR/ER  3x10 Green  X   Rotating curls 3x10 3lbs  X   Power web 3x10 yellow  X   Metal gripper 3x10 15lbs  X   Ulnar nerve glide  2x10 AROM  X   Other:    Treatment Charges: Mins Units   []  Modalities:      []  Ultrasound     [x]  Ther Exercise 35 2   [x]  Manual Therapy 10 1   []  Ther Activities     []  Orthotic fit/train     []  Orthotic recheck     []  Other     Total Treatment time 45 mins        Assessment: [x] Progressing toward goals. Pt arrived to OT reporting increased nerve pn d/t car vibrations. Cont w/ soft tissue mobilization to the R UE. Pt completed hand and forearm strengthening. Increased resistance for shoulder extension, rows and IR/ER. Introduced hand to face ulnar nerve glide. Pt tolerated tx well w/ min c/o soreness during ulnar nerve glides     [] No change. [] Other     [x] Patient would continue to benefit from skilled occupational therapy services in order to: Improve  functional /grasp, I with ADLS, ROM, Strength, Activity tolerance, and Complaint of pain in order to Ensure safe return to work, improve functional use of UE in ADL performance, decrease pain in UE for safe completion of ADLs, and return to PLOF      STG/LTG  Short Term Goals: (  8    Treatments)  Decrease Pain by 2 points on numeric pain scale Met  Increase AROM of R wrist extension by 15 degrees Met  Increase  strength by 10 lbs (set 2/8/23)  Increase pinch strength by 5lbs (set 2/8/23)  Increase function:UE Functional Index Score 20 or more points to promote increased functional abilities Met   Scar will be soft and pliable with minimal tethering. Met  Decrease Edema by .5cm  in R elbow and wrist Not Met  Pt will report ability to complete bathing w/ min assist  Met  Pt will report ability to celine/doff clothing w/ min assist   Met  Patient to be independent with home exercise program as demonstrated by performance with correct form without cues.  Met     Long Term Goals: (  15 Treatments)  Decrease Pain by 2 points on numeric pain scale   Increase AROM of R wrist extension by 10 degrees  Increase  strength by 10 lbs (set 2/8/23)  Increase pinch strength by 5lbs (set 2/8/23)  Increase function:UE Functional Index Score 20 or more points to promote increased functional abilities  Scar will be soft and pliable with minimal tethering. Decrease Edema by .5cm  in R elbow and wrist  Pt will report ability to complete bathing independently  Pt will report ability to celine/doff clothing independently      Pt. Education:  [] Yes  [x] No  [] Reviewed Prior HEP/Ed  Method of Education: [] Verbal  [] Demo  [] Written  Re:  Comprehension of Education:  [] Verbalizes understanding. [] Demonstrates understanding. [] Needs review. [] Demonstrates/verbalizes HEP/Ed previously given. Plan: [x] Continue current frequency toward short and long term goals.   [x] Specific Instructions for subsequent treatments: cont to progress strengthening    [] Other:       Time In: 3971  Time Out: 1430      Electronically signed by:  BIPIN Martinez/IZZY

## 2023-02-15 ENCOUNTER — HOSPITAL ENCOUNTER (OUTPATIENT)
Dept: OCCUPATIONAL THERAPY | Facility: CLINIC | Age: 33
Setting detail: THERAPIES SERIES
Discharge: HOME | End: 2023-02-15
Payer: OTHER GOVERNMENT

## 2023-02-15 PROCEDURE — 97140 MANUAL THERAPY 1/> REGIONS: CPT

## 2023-02-15 PROCEDURE — 97110 THERAPEUTIC EXERCISES: CPT

## 2023-02-15 NOTE — FLOWSHEET NOTE
Patient : Ro Mercado Age: 32 year old Sex: female   MRN: [id number] Encounter Date: 2020      History     Chief Complaint   Patient presents with   • Throat Problem     HPI     Ro Mercado is a 32 year old female presenting with sore throat.   Patient presents emergency department with sore throat that is located in her esophagus, worse with swallowing.  Patient notes that she was up lunch 2 days prior.  Shortly after she noted that she had pain with swallowing.  She denies difficulty swallowing solids or liquids and has no shortness of breath or choking.  She had a howard cheeseburger for lunch prior to onset of her symptoms.      No Known Allergies    Discharge Medication List as of 2020  7:40 PM      Prior to Admission Medications    Details   levonorgestrel (MIRENA, 52 MG,) (52 MG) 20 MCG/DAY intrauterine device Mirena IUD NDC 56289-683-56 placed [id number] Med      amoxicillin (AMOXIL) 500 MG capsule Take 1 capsule by mouth 3 times daily.Eprescribe, Disp-30 capsule, R-0      ibuprofen (MOTRIN) 600 MG tablet Take 1 tablet by mouth every 6 hours as needed for Pain.Eprescribe, Disp-30 tablet, R-0      acetaminophen (TYLENOL) 500 MG tablet Take 500 mg by mouth every 6 hours as needed for Pain.Historical Med         New Prescriptions    Details   Famotidine (PEPCID AC MAXIMUM STRENGTH) 20 MG Chew Tab Chew 20 mg by mouth 2 times daily for 14 days.Eprescribe, Disp-28 tablet, R-0             Past Medical History:   Diagnosis Date   • Anemia    • Anxiety    • Cardiomyopathy (CMS/HCC)    • Congestive cardiac failure (CMS/HCC)    • Essential (primary) hypertension    • History of abnormal Pap smear    • Hx of abnormal Pap smear    • Mental disorder     Anxiety       Past Surgical History:   Procedure Laterality Date   •  SECTION, LOW TRANSVERSE     • COLPOSCOPY,LOOP ELECTRD CERVIX EXCIS  2013       Family History   Problem Relation Age of Onset   • Cancer Mother         liver cancer   •  [] [place]       Occupational Therapy            1st floor       [address], [place]         Phone: [phone]       Fax: [phone] [x] [facility]  [address] [place] [address]  Phone: [phone]  Fax: [phone]     Occupational Therapy Daily Treatment Note    Date:  2/15/2023  Patient Name:  Kenrick Leal    :  1990  MRN: [id number]  Referring Provider:  ALEX Greenfield CNP  Insurance: Other Isaura Trinidadnoni for 15 visits  Medical Diagnosis: Right carpal tunnel syndrome (G56.01), Cubital canal compression syndrome, right (G56.21), Post-op pain (G89.18)  Rehab Codes: spasms of muscle other M62.838, , pain in right upper arm M79.621,, pain in right forearm M79.631,, or pain in right hand M79.641,  Onset Date: 22               Next [address]: 23  Visit# / total visits: 12/15; Progress note for Medicare patient due at visit 8-9    Cancels/No Shows: 0/1      Subjective:    Pain:  [] Yes  [x] No Location: L elbow Pain Rating: (0-10 scale) 0/10  Pain altered Tx:  [x] No  [] Yes  Action:  Pt Comments: pt reports her pn is better today than it has been. Pt reports she stated wearing a compression sleeve on her R elbow today. She states her nerve pn has been a little better but it's still occurring.  Pt reports nerve glides have been helping w/ her nerve pn.     Objective:  Modalities:  Precautions:  Exercises:  Exercise Reps/Time Weight/Level Comments Completed    Elbow AROM  3x10 AROM HEP  Flexion/extension -   Pronation/supination 3x10 2lbs  -   Wall wash 3x10 AAROM Flexion  Scaption  -   Wrist AROM 3x10 AROM HEP  Flexion/extension -   Tendon gliding 3x10 AROM  -   Gripping 2-3 mins Tan    -   Pullys  2 mins each AAROM  Flexion  Scaption  -   Shoulder extension 3x10 Green  X   Tricep extension  3x10 Red  -   Rows  3x10 Green  X   Serratus anterior slide 3x10 AROM  -   Flexbar  Wrist Flexion/Ext  Wrist pronation  Wrist Hepatitis C Mother    • Depression Mother    • High blood pressure Mother    • Psychiatric Mother         bipolar   • Substance abuse Mother    • Asthma Daughter    • Depression Maternal Aunt    • Diabetes Maternal Aunt    • Heart disease Paternal Uncle    • High blood pressure Paternal Uncle    • Arthritis Maternal Grandmother    • Arthritis Maternal Grandfather    • Arthritis Paternal Grandmother    • Arthritis Paternal Grandfather    • Heart disease Paternal Grandfather    • High blood pressure Paternal Grandfather        Social History     Tobacco Use   • Smoking status: Current Every Day Smoker     Packs/day: 0.50     Types: Cigarettes     Start date: 1/1/2003   • Smokeless tobacco: Never Used   Substance Use Topics   • Alcohol use: No     Alcohol/week: 0.0 standard drinks     Frequency: Never     Drinks per session: 1 or 2     Binge frequency: Never   • Drug use: No       Review of Systems   Constitutional: Negative for fever.   HENT: Positive for sore throat. Negative for trouble swallowing.    Respiratory: Negative for shortness of breath.    Cardiovascular: Negative for chest pain.   Gastrointestinal: Negative for abdominal pain and vomiting.   Genitourinary: Negative for difficulty urinating.   Musculoskeletal: Negative.    Skin: Negative for rash.   Neurological: Negative for light-headedness.   All other systems reviewed and are negative.      Physical Exam     ED Triage Vitals   ED Triage Vitals Group      Temp 07/07/20 1825 98.6 °F (37 °C)      Heart Rate 07/07/20 1825 88      Resp 07/07/20 1825 16      BP 07/07/20 1825 138/77      SpO2 07/07/20 1825 100 %      EtCO2 mmHg --       Height 07/07/20 1820 5' 5\" (1.651 m)      Weight 07/07/20 1820 260 lb (117.9 kg)      Weight Scale Used 07/07/20 1820 ED Actual      BMI (Calculated) 07/07/20 1820 43.27      IBW/kg (Calculated) 07/07/20 1820 57       Physical Exam   Constitutional: She is oriented to person, place, and time. She appears well-developed. No  supination  Wrist ulnar deviation  Wrist radial dev 3x10 red  -   UBE 2x3 mins AROM  Forward/backward  X   Bicep curl  1x20 Red  -   IR/ER  3x10 Green  X   Rotating curls 3x10 3lbs  X   Power web 3x10 yellow  X   Metal gripper 3x10 35lbs  X   Ulnar nerve glide  2x10 AROM  X   Other:    Treatment Charges: Mins Units   []  Modalities:      []  Ultrasound     [x]  Ther Exercise 35 2   [x]  Manual Therapy 10 1   []  Ther Activities     []  Orthotic fit/train     []  Orthotic recheck     []  Other     Total Treatment time 45 mins        Assessment: [x] Progressing toward goals. Pt arrived to OT wearing elbow sleeve on R elbow. Pt stated it \"helped\" w/ her soreness. Completed soft tissue mobilization to the R forearm, bicep and hand. Pt completed strengthening and muscular endurance exercises. Pt tolerated tx well on this date. [] No change. [] Other     [x] Patient would continue to benefit from skilled occupational therapy services in order to: Improve  functional /grasp, I with ADLS, ROM, Strength, Activity tolerance, and Complaint of pain in order to Ensure safe return to work, improve functional use of UE in ADL performance, decrease pain in UE for safe completion of ADLs, and return to PLOF      STG/LTG  Short Term Goals: (  8    Treatments)  Decrease Pain by 2 points on numeric pain scale Met  Increase AROM of R wrist extension by 15 degrees Met  Increase  strength by 10 lbs (set 2/8/23)  Increase pinch strength by 5lbs (set 2/8/23)  Increase function:UE Functional Index Score 20 or more points to promote increased functional abilities Met   Scar will be soft and pliable with minimal tethering. Met  Decrease Edema by .5cm  in R elbow and wrist Not Met  Pt will report ability to complete bathing w/ min assist  Met  Pt will report ability to celine/doff clothing w/ min assist   Met  Patient to be independent with home exercise program as demonstrated by performance with correct form without cues.  Met Long Term Goals: (  15  Treatments)  Decrease Pain by 2 points on numeric pain scale   Increase AROM of R wrist extension by 10 degrees  Increase  strength by 10 lbs (set 2/8/23)  Increase pinch strength by 5lbs (set 2/8/23)  Increase function:UE Functional Index Score 20 or more points to promote increased functional abilities  Scar will be soft and pliable with minimal tethering. Decrease Edema by .5cm  in R elbow and wrist  Pt will report ability to complete bathing independently  Pt will report ability to celine/doff clothing independently      Pt. Education:  [] Yes  [x] No  [] Reviewed Prior HEP/Ed  Method of Education: [] Verbal  [] Demo  [] Written  Re:  Comprehension of Education:  [] Verbalizes understanding. [] Demonstrates understanding. [] Needs review. [] Demonstrates/verbalizes HEP/Ed previously given. Plan: [x] Continue current frequency toward short and long term goals.   [x] Specific Instructions for subsequent treatments: cont to progress strengthening    [] Other:       Time In: 8930  Time Out: 1430      Electronically signed by:  BIPIN Dc/IZZY distress.   HENT:   Head: Normocephalic and atraumatic.   Mouth/Throat: Oropharynx is clear and moist.   Eyes: Conjunctivae are normal.   Neck: No tracheal deviation present.   Cardiovascular: Normal rate and regular rhythm.   Pulmonary/Chest: Effort normal. No respiratory distress.   Musculoskeletal: Normal range of motion.   Neurological: She is alert and oriented to person, place, and time.   Skin: Skin is warm and dry.   Psychiatric: She has a normal mood and affect.   Nursing note and vitals reviewed.      ED Course     Procedures    Lab Results     No results found for this visit on 07/07/20.    EKG Results       Radiology Results     Imaging Results    None         ED Medication Orders (From admission, onward)    Ordered Start     Status Ordering Provider    07/07/20 1933 07/07/20 1934  alum-mag hydroxide+simethicone/lidocaine viscous (2:1) (MAGIC MOUTHWASH/GI COCKTAIL) (compounded) oral suspension 15 mL  ONCE      Last MAR action:  Given GERMAN GOLDEN               MDM     Ro Mercado is a 32 year old female presenting to the ED for throat pain with swallowing.    Initial vitals on presentation are normal.    Patient has history and physical exam findings consistent with likely esophageal abrasion.  She has no evidence of esophageal obstruction and is clinically well-appearing.  She is given a GI cocktail with resolution of her symptoms.    I updated the patient on the results of their evaluation and they are comfortable with plan for disposition as discussed.    Discharge home with pepcid and soft/bland diet. Plan for f/u with pcp if not improving. Return to ED instructions given and/or discussed.        Clinical Impression     ED Diagnosis   1. Esophageal abrasion, initial encounter         Disposition        Discharge 7/7/2020  7:33 PM  Ro Mercado discharge to home/self care.           German Golden,   07/08/20 0150

## 2023-02-20 ENCOUNTER — HOSPITAL ENCOUNTER (OUTPATIENT)
Dept: OCCUPATIONAL THERAPY | Facility: CLINIC | Age: 33
Setting detail: THERAPIES SERIES
Discharge: HOME | End: 2023-02-20
Payer: OTHER GOVERNMENT

## 2023-02-20 NOTE — FLOWSHEET NOTE
[] [facility]  [address]    [phone]  F: [phone]   [] [address] [facility] [address]   [address]  P: [phone]  F: [phone]  [] [address]  [address] [facility]  P: [phone]  F: [phone] [x] [address]: [phone]  F: [phone]  [] [address]  [facility]   [address]   [place]: [phone]  F: [phone]   [] [address]  [place]: [phone]   F: [phone]     Physical Therapy Cancel/No Show note    Date: 2023  Patient: Mary Álvarez  : 1990  MRN: [id number]    Cancels/No Shows to date:     For today's appointment patient:    [x]  Cancelled    [] Rescheduled appointment    [] No-show     Reason given by patient:    [x]  Patient ill    []  Conflicting appointment    [] No transportation      [] Conflict with work    [] No reason given    [] Weather related    [] COVID-19    [] Other:      Comments: Reports significant migraine.        [x] Next appointment was confirmed    Electronically signed by: Rio Perez, PT

## 2023-02-22 ENCOUNTER — HOSPITAL ENCOUNTER (OUTPATIENT)
Dept: OCCUPATIONAL THERAPY | Facility: CLINIC | Age: 33
Setting detail: THERAPIES SERIES
Discharge: HOME | End: 2023-02-22
Payer: OTHER GOVERNMENT

## 2023-02-22 NOTE — SIGNIFICANT EVENT
[] [address] [place] [address] Blvd[address]        Occupational Therapy       [address] [facility], 1st Floor       Phone: [phone]       Fax: [phone] [x] [facility]       Phone: [phone]       Fax: [phone]          Occupational Therapy Cancel/No Show note    Date: 2023  Patient: Savage Davis  : 1990  MRN: [id number]  Cancels/No Shows to date:     For today's appointment patient:  [x]  Cancelled  []  Rescheduled appointment  []  No-show     Reason given by patient:  [x]  Patient ill  []  Conflicting appointment  []  No transportation    []  Conflict with work  []  No reason given  []  Other:     Comments:      Electronically signed by: Sharath Torres OTR/L

## 2023-02-27 ENCOUNTER — HOSPITAL ENCOUNTER (OUTPATIENT)
Dept: OCCUPATIONAL THERAPY | Facility: CLINIC | Age: 33
Setting detail: THERAPIES SERIES
Discharge: HOME | End: 2023-02-27
Payer: OTHER GOVERNMENT

## 2023-02-27 PROCEDURE — 97110 THERAPEUTIC EXERCISES: CPT

## 2023-02-27 PROCEDURE — 97140 MANUAL THERAPY 1/> REGIONS: CPT

## 2023-02-27 NOTE — FLOWSHEET NOTE
20  [] [place]       Occupational Therapy            1st floor       [address], [place]         Phone: [phone]       Fax: [phone] [x] [facility]  [address] [place] [address]  Phone: [phone]  Fax: [phone]     Occupational Therapy Daily Treatment Note    Date:  2023  Patient Name:  Josh Morton    :  1990  MRN: [id number]  Referring Provider:  ALEX Menon - CNP  Insurance: Other Bryce Cox for 15 visits  Medical Diagnosis: Right carpal tunnel syndrome (G56.01), Cubital canal compression syndrome, right (G56.21), Post-op pain (G89.18)  Rehab Codes: spasms of muscle other M62.838, , pain in right upper arm M79.621,, pain in right forearm M79.631,, or pain in right hand M79.641,  Onset Date: 22               Next [address]: 23  Visit# / total visits: 13/15; Progress note for Medicare patient due at visit 8-9    Cancels/No Shows: 0/1      Subjective:    Pain:  [] Yes  [x] No Location: L elbow Pain Rating: (0-10 scale) 0/10  Pain altered Tx:  [x] No  [] Yes  Action:  Pt Comments: pt reports she returned to work last week and has been doing light duty. 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 Pt report UBE was easier to complete today than previous visits.      Objective:  Modalities:  Precautions:  Exercises:  Exercise Reps/Time Weight/Level Comments Completed    Elbow AROM  3x10 AROM HEP  Flexion/extension -   Pronation/supination 3x10 2lbs  -   Wall wash 3x10 AAROM Flexion  Scaption  -   Wrist AROM 3x10 AROM HEP  Flexion/extension -   Tendon gliding 3x10 AROM  -   Gripping 2-3 mins Tan    -   Pullys  2 mins each AAROM  Flexion  Scaption  -   Shoulder extension 3x10 Green  -   Tricep extension  3x10 Red  -   Rows  3x10 Green  X   Serratus anterior slide 3x10 AROM  -   Flexbar  Wrist Flexion/Ext  Wrist pronation  Wrist supination  Wrist ulnar deviation  Wrist radial dev 3x10 red  -   UBE 2x3 mins AROM  Forward/backward  X   Bicep curl  1x20 Red  -   IR/ER  3x10 Green  --   Rotating curls 3x10 3lbs  X   Power web 3x10 yellow  X   Metal gripper 3x10 35lbs  X   Ulnar nerve glide  2x10 AROM  X   Other:    Treatment Charges: Mins Units   []  Modalities:      []  Ultrasound     [x]  Ther Exercise 35 2   [x]  Manual Therapy 10 1   []  Ther Activities     []  Orthotic fit/train     []  Orthotic recheck     []  Other     Total Treatment time 45 mins        Assessment: [x] Progressing toward goals. Pt is doing well w/ OT to this point and is making steady improvement. Completed soft tissue mobilization to the R bicep, brachialis, and forearm. Pt complete  and hand strengthening. Pt tolerated tx well w/out complaint. Provided pt w/ wall bicep stretch and bicep blocking stretch. Pt demonstrated good understanding of stretches. POC to see pt 1x/wk for 2/wks. Pt in agreement w/ this POC. Pt tolerated tx well on this date. [] No change. [] Other     [x] Patient would continue to benefit from skilled occupational therapy services in order to: Improve  functional /grasp, I with ADLS, ROM, Strength, Activity tolerance, and Complaint of pain in order to Ensure safe return to work, improve functional use of UE in ADL performance, decrease pain in UE for safe completion of ADLs, and return to PLOF      STG/LTG  Short Term Goals: (  8    Treatments)  Decrease Pain by 2 points on numeric pain scale Met  Increase AROM of R wrist extension by 15 degrees Met  Increase  strength by 10 lbs (set 2/8/23)  Increase pinch strength by 5lbs (set 2/8/23)  Increase function:UE Functional Index Score 20 or more points to promote increased functional abilities Met   Scar will be soft and pliable with minimal tethering.  Met  Decrease Edema by .5cm  in R elbow and wrist Not Met  Pt will report ability to complete bathing w/ min assist  Met  Pt will report ability to celine/doff clothing w/ min assist   Met  Patient to be independent with home exercise program as demonstrated by performance with correct form without cues. Met     Long Term Goals: (  15  Treatments)  Decrease Pain by 2 points on numeric pain scale   Increase AROM of R wrist extension by 10 degrees  Increase  strength by 10 lbs (set 2/8/23)  Increase pinch strength by 5lbs (set 2/8/23)  Increase function:UE Functional Index Score 20 or more points to promote increased functional abilities  Scar will be soft and pliable with minimal tethering. Decrease Edema by .5cm  in R elbow and wrist  Pt will report ability to complete bathing independently  Pt will report ability to celine/doff clothing independently      Pt. Education:  [] Yes  [x] No  [] Reviewed Prior HEP/Ed  Method of Education: [] Verbal  [] Demo  [] Written  Re:  Comprehension of Education:  [] Verbalizes understanding. [] Demonstrates understanding. [] Needs review. [] Demonstrates/verbalizes HEP/Ed previously given. Plan: [x] Continue current frequency toward short and long term goals.   [x] Specific Instructions for subsequent treatments: cont to progress strengthening    [] Other:       Time In: 4459  Time Out: 1325      Electronically signed by:  BIPIN Cerda/IZZY

## 2023-03-08 ENCOUNTER — HOSPITAL ENCOUNTER (OUTPATIENT)
Dept: OCCUPATIONAL THERAPY | Facility: CLINIC | Age: 33
Setting detail: THERAPIES SERIES
Discharge: HOME | End: 2023-03-08
Payer: OTHER GOVERNMENT

## 2023-03-08 PROCEDURE — 97110 THERAPEUTIC EXERCISES: CPT

## 2023-03-08 PROCEDURE — 97140 MANUAL THERAPY 1/> REGIONS: CPT

## 2023-03-15 ENCOUNTER — HOSPITAL ENCOUNTER (OUTPATIENT)
Dept: OCCUPATIONAL THERAPY | Facility: CLINIC | Age: 33
Setting detail: THERAPIES SERIES
Discharge: HOME | End: 2023-03-15
Payer: OTHER GOVERNMENT

## 2023-03-15 PROCEDURE — 97140 MANUAL THERAPY 1/> REGIONS: CPT

## 2023-03-15 PROCEDURE — 97110 THERAPEUTIC EXERCISES: CPT

## 2023-03-15 NOTE — DISCHARGE SUMMARY
[] [facility]  [address]  [phone]  F: [phone] [x] [facility]  [address] [place] [address]  Phone: [phone]  Fax: [phone]     Occupational Therapy Discharge Note    Date: 3/15/2023      Patient: Blanca Pagan  : 1990  MRN: [id number]    Referring Provider:  ALEX Owen CNP  Insurance: Other [employer] for 15 visits  Medical Diagnosis: Right carpal tunnel syndrome (G56.01), Cubital canal compression syndrome, right (G56.21), Post-op pain (G89.18)  Rehab Codes: spasms of muscle other M62.838, , pain in right upper arm M79.621,, pain in right forearm M79.631,, or pain in right hand M79.641,  Onset Date: 22               Next [address]: 23  Total visits attended: 15  Cancels/No shows: 0/1  Date of initial visit: 23                Date of final visit: 3/15/23    Subjective:  Pain:  [x] Yes  [] No  Location: R palm, volar forearm and elbow  Pain Rating: (0-10 scale) 2/10   Pain altered Tx:  [x] No  [] Yes  Action:  Comments: Pt reports she had a little numbness and tingling during work over the weekend but it did not prevent her from working. Pt reports on in her R elbow in her bicep. Objective:  Test Measurements: 63/80 functionally impaired as measured with the Upper Extremity Functional Index Survey. 0-80 scale, with 80 = no Deficits  (The UEFI model does not provide any specific cut off points that could classify the upper limb disability degree, however, a minimal detectable change of 9 points is provided. This means that for improvement or deterioration to be considered, between two subsequent evaluations, the scores must differ by at least 9 points.   Function:      Edema    Right Left   Elbow Joint 27cm 26.5cm   7cm below joint 14.5cm 16cm   Wrist joint 15.5cm 15cm         UE ROM/MMT    Right Left MMT Right MMT Left   Shoulder           [facility] 5/5 5/5   Extension [facility] 5/5 5/5   Adduction [facility] 5/5/ 5/5   Internal Rotation [facility] WFL 5/5 5/5   External Rotation [facility] WFL 5/5 5/5   Elbow           Flexion [facility] WFL 5/5 5/5   Extension [facility] WFL 5/5 5/5   Pronation [facility]F 5/5 5/5   Supination  [facility] WF 5/5 5/5   Wrist           Flexion 68 (91%) 74 5/5 5/5   Extension 60 (92%) 65 5/5/ 5/5   Radial deviation [facility] WF 5/5 5/5   Ulnar deviation [facility] WF 5/5 5/5         COORDINATION  - Fine Motor (speed/dexterity)       Right in seconds Percentile Left in seconds Percentile   9 Hole Peg Test 20 seconds   20 seconds            STRENGTH                  Right   (pounds) Left   (pounds)    position 1 39 (84%)  46    position 2 41 (82%)  50   Lateral pinch 16 (94%)  17   2 point pinch 10 10   3 jaw pinch 13 13      Bilateral  strength is normally symmetrical or up to 10% stronger on the dominant extremity, depending on the individual's physically activity level. Assessment:  Short Term Goals: (  8    Treatments)  Decrease Pain by 2 points on numeric pain scale Met  Increase AROM of R wrist extension by 15 degrees Met  Increase  strength by 10 lbs (set 2/8/23) Met  Increase pinch strength by 5lbs (set 2/8/23) Met  Increase function:UE Functional Index Score 20 or more points to promote increased functional abilities Met   Scar will be soft and pliable with minimal tethering. Met  Decrease Edema by .5cm  in R elbow and wrist Met  Pt will report ability to complete bathing w/ min assist  Met  Pt will report ability to celine/doff clothing w/ min assist   Met  Patient to be independent with home exercise program as demonstrated by performance with correct form without cues.  Met    Long Term Goals: (  16  Treatments)  Decrease Pain by 2 points on numeric pain scale  Met  Increase AROM of R wrist extension by 10 degrees Met  Increase  strength by 10 lbs (set 2/8/23) Met  Increase pinch strength by 5lbs (set 2/8/23) Met  Increase function:UE Functional Index Score 20 or more points to promote increased functional abilities Met  Scar will be soft and pliable with minimal tethering. Met  Decrease Edema by .5cm  in R elbow and wrist Met  Pt will report ability to complete bathing independently Met  Pt will report ability to celine/doff clothing independently Met    Treatment Plan:   [x]  Therapeutic Exercise   78070              []  Iontophoresis: 4 mg/mL Dexamethasone Sodium Phosphate  mAmin  77500    []  Therapeutic Activity  46173  []  Vasopneumatic cold with compression  08941                []  ADL training  00943  []  Ultrasound        36007    []  Neuromuscular Re-education  22042  []  Electrical Stimulation Attended  53009    [x]  Manual Therapy  73444  Orthotic    []  Fit  32423     []  Train 71888    []  Instruction in HEP        Prosthetic    []  Fit [id number]      []  Train 58881    []  Cognitive Interventions, (first 15 min 58237, subsequent 15 min 42234)  []  Cold/hotpack      []  Massage   30344             Discharge Status:     [x] Pt recovered from conditions. Treatment goals were met. [] Pt received maximum benefit. No further therapy indicated at this time. [] Pt to continue exercise/home instructions independently. [] Therapy interrupted due to:    [] Pt has 2 or more no shows/cancels, is discontinued per our policy. [] Pt has completed prescribed number of treatment sessions. [] Other:       Electronically signed by: Zeus Resendez OTR/L    If you have any questions or concerns, please don't hesitate to call.   Thank you for your referral.

## 2023-03-15 NOTE — FLOWSHEET NOTE
[] [place]       Occupational Therapy            1st floor       [address], [place]         Phone: [phone]       Fax: [phone] [x] [facility]  [address] [place] [address]  Phone: [phone]  Fax: [phone]     Occupational Therapy Daily Treatment Note    Date:  3/15/2023  Patient Name:  Mini Genao    :  1990  MRN: [id number]  Referring Provider:  ALEX Haile CNP  Insurance: Other [facility] for 15 visits  Medical Diagnosis: Right carpal tunnel syndrome (G56.01), Cubital canal compression syndrome, right (G56.21), Post-op pain (G89.18)  Rehab Codes: spasms of muscle other M62.838, , pain in right upper arm M79.621,, pain in right forearm M79.631,, or pain in right hand M79.641,  Onset Date: 22               Next Dr. Lexx Ortiz: 23  Visit# / total visits: 15/15; Progress note for Medicare patient due at visit 8-9    Cancels/No Shows: 0/1      Subjective:    Pain:  [] Yes  [x] No Location: L elbow Pain Rating: (0-10 scale) 0/10  Pain altered Tx:  [x] No  [] Yes  Action:  Pt Comments: See d/c note dated 3/15/23 for details     Objective:  Modalities:  Precautions:  Exercises:  Exercise Reps/Time Weight/Level Comments Completed    Elbow AROM  3x10 AROM HEP  Flexion/extension -   Pronation/supination 3x10 2lbs  -   Wall wash 3x10 AAROM Flexion  Scaption  -   Wrist AROM 3x10 AROM HEP  Flexion/extension -   Tendon gliding 3x10 AROM  -   Gripping 2-3 mins Tan    -   Pullys  2 mins each AAROM  Flexion  Scaption  -   Shoulder extension 3x10 Green  -   Tricep extension  3x10 Green  X   Rows  3x10 Green  X   Serratus anterior slide 3x10 AROM  -   Flexbar  Wrist Flexion/Ext  Wrist pronation  Wrist supination  Wrist ulnar deviation  Wrist radial dev 3x10 Red  -   UBE 2x3 mins AROM  Forward/backward  -   Bicep curl  1x20 Red  -   IR/ER  3x10 Green  -   Rotating curls 3x10 4lbs  X   Power web 3x10 yellow  -   Metal gripper 3x10 35lbs  -   Ulnar nerve glide  2x10 AROM  -   Other:    Treatment Charges: Mins Units   []  Modalities:      []  Ultrasound     [x]  Ther Exercise 35 2   [x]  Manual Therapy 10 1   []  Ther Activities     []  Orthotic fit/train     []  Orthotic recheck     []  Other     Total Treatment time 45 mins        Assessment: [x] Progressing toward goals. Completed formal measurements for d/c on this date, see d/c note for details. POC to d/c pt on this date based on formal measurements, pt is in agreement w/ this POC. Completed soft tissue mobilization to the R bicep, brachialis, and forearm. Reviewed HEP for strengthening and stretching. Pt tolerated tx well on this date. [] No change. [] Other     [x] Patient would continue to benefit from skilled occupational therapy services in order to: Improve  functional /grasp, I with ADLS, ROM, Strength, Activity tolerance, and Complaint of pain in order to Ensure safe return to work, improve functional use of UE in ADL performance, decrease pain in UE for safe completion of ADLs, and return to PLOF      STG/LTG  Short Term Goals: (  8    Treatments)  Decrease Pain by 2 points on numeric pain scale Met  Increase AROM of R wrist extension by 15 degrees Met  Increase  strength by 10 lbs (set 2/8/23) Met  Increase pinch strength by 5lbs (set 2/8/23) Met  Increase function:UE Functional Index Score 20 or more points to promote increased functional abilities Met   Scar will be soft and pliable with minimal tethering. Met  Decrease Edema by .5cm  in R elbow and wrist Met  Pt will report ability to complete bathing w/ min assist  Met  Pt will report ability to celine/doff clothing w/ min assist   Met  Patient to be independent with home exercise program as demonstrated by performance with correct form without cues.  Met     Long Term Goals: (  16  Treatments)  Decrease Pain by 2 points on numeric pain scale  Met  Increase AROM of R wrist extension by 10 degrees Met  Increase  strength by 10 lbs (set 2/8/23) Met  Increase pinch strength by 5lbs (set 2/8/23) Met  Increase function:UE Functional Index Score 20 or more points to promote increased functional abilities Met  Scar will be soft and pliable with minimal tethering. Met  Decrease Edema by .5cm  in R elbow and wrist Met  Pt will report ability to complete bathing independently Met  Pt will report ability to celine/doff clothing independently Met      Pt. Education:  [] Yes  [x] No  [] Reviewed Prior HEP/Ed  Method of Education: [] Verbal  [] Demo  [] Written  Re:  Comprehension of Education:  [] Verbalizes understanding. [] Demonstrates understanding. [] Needs review. [] Demonstrates/verbalizes HEP/Ed previously given. Plan: [x] Continue current frequency toward short and long term goals.   [x] Specific Instructions for subsequent treatments: cont to progress strengthening    [] Other:       Time In: 4109  Time Out: 1100      Electronically signed by:  BIPIN Mota/IZZY

## 2023-03-30 ENCOUNTER — PATIENT MESSAGE (OUTPATIENT)
Dept: FAMILY MEDICINE CLINIC | Age: 33
End: 2023-03-30

## 2023-03-30 ENCOUNTER — HOSPITAL ENCOUNTER (EMERGENCY)
Facility: CLINIC | Age: 33
Discharge: HOME | End: 2023-03-30
Attending: EMERGENCY MEDICINE
Payer: OTHER GOVERNMENT

## 2023-03-30 VITALS
OXYGEN SATURATION: 99 % | WEIGHT: 160 LBS | RESPIRATION RATE: 18 BRPM | HEART RATE: 92 BPM | BODY MASS INDEX: 29.44 KG/M2 | SYSTOLIC BLOOD PRESSURE: 138 MMHG | HEIGHT: 62 IN | TEMPERATURE: 98.6 F | DIASTOLIC BLOOD PRESSURE: 82 MMHG

## 2023-03-30 DIAGNOSIS — J04.0 LARYNGITIS: Primary | ICD-10-CM

## 2023-03-30 PROCEDURE — 6360000002 HC RX W HCPCS: Performed by: EMERGENCY MEDICINE

## 2023-03-30 PROCEDURE — 99283 EMERGENCY DEPT VISIT LOW MDM: CPT

## 2023-03-30 RX ORDER — DEXAMETHASONE 4 MG/1
6 TABLET ORAL ONCE
Status: COMPLETED | OUTPATIENT
Start: 2023-03-30 | End: 2023-03-30

## 2023-03-30 RX ADMIN — DEXAMETHASONE 6 MG: 4 TABLET ORAL at 19:54

## 2023-03-30 ASSESSMENT — ENCOUNTER SYMPTOMS
GASTROINTESTINAL NEGATIVE: 1
EYES NEGATIVE: 1
RESPIRATORY NEGATIVE: 1
SORE THROAT: 1
ALLERGIC/IMMUNOLOGIC NEGATIVE: 1

## 2023-03-30 ASSESSMENT — PAIN - FUNCTIONAL ASSESSMENT: PAIN_FUNCTIONAL_ASSESSMENT: 0-10

## 2023-03-30 ASSESSMENT — LIFESTYLE VARIABLES: HOW OFTEN DO YOU HAVE A DRINK CONTAINING ALCOHOL: NEVER

## 2023-03-30 ASSESSMENT — PAIN SCALES - GENERAL: PAINLEVEL_OUTOF10: 10

## 2023-03-30 ASSESSMENT — PAIN DESCRIPTION - LOCATION: LOCATION: THROAT

## 2023-03-30 NOTE — TELEPHONE ENCOUNTER
From: Kimberli Rodriguez  To: Gabino Velasquez  Sent: 3/30/2023 8:14 AM EDT  Subject: Throat Pain    [facility]. I had gotten a positive COVID test on Monday. Yesterday I developed throat pain to the point where it's hard to swallow or even talk. I have a lot of phlegm as well. I tried using Mucinex DM to break it up, and it hasn't really done anything. I still have throat pain this morning. What can I do? Please help.

## 2023-03-30 NOTE — TELEPHONE ENCOUNTER
Also received after hours page from patient, but was unable to connect with her as calls went straight to  x 2

## 2023-03-30 NOTE — ED PROVIDER NOTES
hours as needed for Pain    AMITRIPTYLINE (ELAVIL) 25 MG TABLET    Take 1 tablet by mouth nightly    ELASTIC BANDAGES & SUPPORTS (WRIST SPLINT/COCK-UP/RIGHT SM) MISC    1 each by Does not apply route nightly as needed (wrist pain)    GABAPENTIN (NEURONTIN) 300 MG CAPSULE    Take 1 capsule by mouth 3 times daily for 30 days. Intended supply: 90 days    ONDANSETRON (ZOFRAN-ODT) 4 MG DISINTEGRATING TABLET    Take 1 tablet by mouth every 8 hours as needed for Nausea or Vomiting       ALLERGIES     is allergic to other, turmeric, codeine, tylenol [acetaminophen], and vicodin [hydrocodone-acetaminophen]. FAMILY HISTORY     She indicated that her mother is alive. She indicated that her father is alive. She indicated that both of her brothers are alive. She indicated that her maternal grandmother is alive. She indicated that her maternal grandfather is alive. She indicated that the status of her paternal grandmother is unknown. She indicated that her paternal grandfather is . She indicated that the status of her son is unknown.     family history includes ADHD in her brother, brother, and son; Anxiety Disorder in her mother; COPD in her maternal grandfather; Cancer in her maternal grandfather; Colon Cancer in her paternal grandfather; Depression in her brother, father, and mother; Diabetes type 2  in her maternal aunt; Lung Cancer in her maternal grandfather; No Known Problems in her paternal grandmother; Osteoarthritis in her maternal grandmother; Other in her brother and son; Ovarian Cancer in her maternal aunt. SOCIAL HISTORY      reports that she quit smoking about 3 years ago. Her smoking use included cigarettes. She started smoking about 21 years ago. She has a 8.50 pack-year smoking history. She quit smokeless tobacco use about 3 years ago. She reports current alcohol use. She reports current drug use. Drug: Marijuana Ronny Javier).     PHYSICAL EXAM     INITIAL VITALS:  height is 5' 2\" (1.575 m) and weight is Temp: 98.6 °F (37 °C)   TempSrc: Oral   SpO2: 99%   Weight: 72.6 kg (160 lb)   Height: 5' 2\" (1.575 m)     -------------------------  /82   Pulse 92   Temp 98.6 °F (37 °C) (Oral)   Resp 18   Ht 5' 2\" (1.575 m)   Wt 72.6 kg (160 lb)   SpO2 99%   BMI 29.26 kg/m²         I have reviewed the disposition diagnosis with the patient and or their family/guardian. I have answered their questions and given discharge instructions. They voiced understanding of these instructions and did not have any further questions or complaints. CRITICAL CARE:    None    CONSULTS:    None    PROCEDURES:    None      OARRS Report if indicated             FINAL IMPRESSION      1. Laryngitis          DISPOSITION/PLAN   DISPOSITION Decision To Discharge    I have reviewed the disposition diagnosis with the patient and or their family/guardian. I have answered their questions and given discharge instructions. They voiced understanding of these instructions and did not have any further questions or complaints. Reevaluation:Patient's examination is consistent with a viral pharyngitis. We will go ahead and give her some steroids here and then discharged home for symptomatic treatment with her own physician.       PATIENT REFERRED TO:  Raina Carolina MD  [address] [place] [address]    In 2 days      DISCHARGE MEDICATIONS:  New Prescriptions    No medications on file       (Please note that portions of this note were completed with a voice recognition program.  Efforts were made to edit the dictations but occasionally words are mis-transcribed.)    Lorena Staton MD,  Attending Emergency Physician           Lorena Staton MD  03/30/23 [id number]

## 2023-03-30 NOTE — DISCHARGE INSTRUCTIONS
Ibuprofen for pain, warm salt water gargles, hot tea lemon and honey, follow-up with your doctor in 1 to 2 days -vocal rest is important. Return back to an ER if you are worsening in any way.

## 2023-04-03 ENCOUNTER — OFFICE VISIT (OUTPATIENT)
Dept: FAMILY MEDICINE CLINIC | Age: 33
End: 2023-04-03
Payer: OTHER GOVERNMENT

## 2023-04-03 VITALS
HEART RATE: 79 BPM | DIASTOLIC BLOOD PRESSURE: 64 MMHG | SYSTOLIC BLOOD PRESSURE: 118 MMHG | WEIGHT: 163 LBS | BODY MASS INDEX: 29.81 KG/M2 | OXYGEN SATURATION: 98 %

## 2023-04-03 DIAGNOSIS — J04.0 ACUTE LARYNGITIS: Primary | ICD-10-CM

## 2023-04-03 PROCEDURE — 99213 OFFICE O/P EST LOW 20 MIN: CPT | Performed by: FAMILY MEDICINE

## 2023-04-03 PROCEDURE — 96372 THER/PROPH/DIAG INJ SC/IM: CPT | Performed by: FAMILY MEDICINE

## 2023-04-03 RX ORDER — PREDNISONE 20 MG/1
20 TABLET ORAL 2 TIMES DAILY
Qty: 6 TABLET | Refills: 0 | Status: SHIPPED | OUTPATIENT
Start: 2023-04-03 | End: 2023-04-06

## 2023-04-03 RX ORDER — METHYLPREDNISOLONE SODIUM SUCCINATE 125 MG/2ML
125 INJECTION, POWDER, LYOPHILIZED, FOR SOLUTION INTRAMUSCULAR; INTRAVENOUS ONCE
Status: COMPLETED | OUTPATIENT
Start: 2023-04-03 | End: 2023-04-03

## 2023-04-03 RX ADMIN — METHYLPREDNISOLONE SODIUM SUCCINATE 125 MG: 125 INJECTION, POWDER, LYOPHILIZED, FOR SOLUTION INTRAMUSCULAR; INTRAVENOUS at 09:00

## 2023-04-03 SDOH — ECONOMIC STABILITY: FOOD INSECURITY: WITHIN THE PAST 12 MONTHS, THE FOOD YOU BOUGHT JUST DIDN'T LAST AND YOU DIDN'T HAVE MONEY TO GET MORE.: NEVER TRUE

## 2023-04-03 SDOH — ECONOMIC STABILITY: HOUSING INSECURITY
IN THE LAST 12 MONTHS, WAS THERE A TIME WHEN YOU DID NOT HAVE A STEADY PLACE TO SLEEP OR SLEPT IN A SHELTER (INCLUDING NOW)?: NO

## 2023-04-03 SDOH — ECONOMIC STABILITY: FOOD INSECURITY: WITHIN THE PAST 12 MONTHS, YOU WORRIED THAT YOUR FOOD WOULD RUN OUT BEFORE YOU GOT MONEY TO BUY MORE.: NEVER TRUE

## 2023-04-03 SDOH — ECONOMIC STABILITY: INCOME INSECURITY: HOW HARD IS IT FOR YOU TO PAY FOR THE VERY BASICS LIKE FOOD, HOUSING, MEDICAL CARE, AND HEATING?: NOT HARD AT ALL

## 2023-04-03 ASSESSMENT — PATIENT HEALTH QUESTIONNAIRE - PHQ9
10. IF YOU CHECKED OFF ANY PROBLEMS, HOW DIFFICULT HAVE THESE PROBLEMS MADE IT FOR YOU TO DO YOUR WORK, TAKE CARE OF THINGS AT HOME, OR GET ALONG WITH OTHER PEOPLE: 2
DEPRESSION UNABLE TO ASSESS: FUNCTIONAL CAPACITY MOTIVATION LIMITS ACCURACY
6. FEELING BAD ABOUT YOURSELF - OR THAT YOU ARE A FAILURE OR HAVE LET YOURSELF OR YOUR FAMILY DOWN: 3
SUM OF ALL RESPONSES TO PHQ QUESTIONS 1-9: 18
8. MOVING OR SPEAKING SO SLOWLY THAT OTHER PEOPLE COULD HAVE NOTICED. OR THE OPPOSITE, BEING SO FIGETY OR RESTLESS THAT YOU HAVE BEEN MOVING AROUND A LOT MORE THAN USUAL: 0
2. FEELING DOWN, DEPRESSED OR HOPELESS: 3
5. POOR APPETITE OR OVEREATING: 3
SUM OF ALL RESPONSES TO PHQ9 QUESTIONS 1 & 2: 6
3. TROUBLE FALLING OR STAYING ASLEEP: 2
7. TROUBLE CONCENTRATING ON THINGS, SUCH AS READING THE NEWSPAPER OR WATCHING TELEVISION: 3
9. THOUGHTS THAT YOU WOULD BE BETTER OFF DEAD, OR OF HURTING YOURSELF: 0
4. FEELING TIRED OR HAVING LITTLE ENERGY: 1
1. LITTLE INTEREST OR PLEASURE IN DOING THINGS: 3
SUM OF ALL RESPONSES TO PHQ QUESTIONS 1-9: 18

## 2023-04-03 ASSESSMENT — ENCOUNTER SYMPTOMS
CONSTIPATION: 0
SORE THROAT: 1
COUGH: 0
VOICE CHANGE: 1
EYES NEGATIVE: 1
ABDOMINAL PAIN: 0
ALLERGIC/IMMUNOLOGIC NEGATIVE: 1
SHORTNESS OF BREATH: 0
DIARRHEA: 0

## 2023-04-03 NOTE — PROGRESS NOTES
not apply route nightly as needed (wrist pain) 1 each 0     Current Facility-Administered Medications   Medication Dose Route Frequency Provider Last Rate Last Admin    methylPREDNISolone sodium (SOLU-MEDROL) injection 125 mg  125 mg IntraMUSCular Once Marta Weber MD           ALLERGIES    Allergies   Allergen Reactions    Other Hives     Paprika     Turmeric Hives    Codeine Other (See Comments)     Per genetic testing she has found that she over-metabolizes medication     Tylenol [Acetaminophen] Other (See Comments)     Per genetic testing she has found that she over-metabolizes medication     Vicodin [Hydrocodone-Acetaminophen] Other (See Comments)     Per genetic testing she has found that she over-metabolizes medication        PHYSICAL EXAM   Physical Exam  Vitals reviewed. Constitutional:       Appearance: She is well-developed. HENT:      Head: Normocephalic. Mouth/Throat:        Comments: Voice hoarse  Eyes:      Pupils: Pupils are equal, round, and reactive to light. Neck:      Thyroid: No thyromegaly. Cardiovascular:      Rate and Rhythm: Normal rate and regular rhythm. Heart sounds: Normal heart sounds. No murmur heard. Pulmonary:      Effort: Pulmonary effort is normal.      Breath sounds: Normal breath sounds. No wheezing or rales. Abdominal:      Palpations: Abdomen is soft. Tenderness: There is no abdominal tenderness. Musculoskeletal:         General: No tenderness or deformity. Normal range of motion. Cervical back: Normal range of motion and neck supple. Lymphadenopathy:      Head:      Right side of head: No submandibular adenopathy. Left side of head: No submandibular adenopathy. Cervical: No cervical adenopathy. Right cervical: No superficial cervical adenopathy. Left cervical: No superficial cervical adenopathy. Upper Body:      Right upper body: No supraclavicular adenopathy. Left upper body: No supraclavicular adenopathy.

## 2023-04-03 NOTE — LETTER
April 3, 2023       Lorenza Sharma YOB: 1990   [address] Date of Visit:  4/3/2023       To Whom It May Concern: It is my medical opinion that Lorenza Sharma may return to work on 04/07/2023 . If you have any questions or concerns, please don't hesitate to call.     Sincerely,        Maycol Daigle MD

## 2023-04-03 NOTE — Clinical Note
April 3, 2023       Amparo Valadez YOB: 1990   [address] Date of Visit:  4/3/2023       To Whom It May Concern: It is my medical opinion that Amparo Valadez {Work release (duty restriction):90666}. If you have any questions or concerns, please don't hesitate to call.     Sincerely,        Parveen Mccollum MD

## 2023-04-03 NOTE — LETTER
April 3, 2023       Gamaliel Caceres YOB: 1990   [address] Date of Visit:  4/3/2023       To Whom It May Concern: It is my medical opinion that Gamaliel Caceres {Work release (duty restriction):79995}. If you have any questions or concerns, please don't hesitate to call.     Sincerely,        Renee Davalos MD

## 2023-05-05 ENCOUNTER — OFFICE VISIT (OUTPATIENT)
Dept: NEUROSURGERY | Age: 33
End: 2023-05-05
Payer: OTHER GOVERNMENT

## 2023-05-05 VITALS
DIASTOLIC BLOOD PRESSURE: 88 MMHG | SYSTOLIC BLOOD PRESSURE: 129 MMHG | WEIGHT: 163 LBS | HEART RATE: 83 BPM | TEMPERATURE: 97.5 F | HEIGHT: 62 IN | BODY MASS INDEX: 30 KG/M2 | OXYGEN SATURATION: 99 %

## 2023-05-05 DIAGNOSIS — S46.212A BICEPS STRAIN, LEFT, INITIAL ENCOUNTER: ICD-10-CM

## 2023-05-05 DIAGNOSIS — G56.21 CUBITAL CANAL COMPRESSION SYNDROME, RIGHT: ICD-10-CM

## 2023-05-05 DIAGNOSIS — G56.21 GUYON SYNDROME, RIGHT: ICD-10-CM

## 2023-05-05 DIAGNOSIS — L74.512 HYPERHIDROSIS OF HANDS: ICD-10-CM

## 2023-05-05 DIAGNOSIS — G56.01 RIGHT CARPAL TUNNEL SYNDROME: Primary | ICD-10-CM

## 2023-05-05 DIAGNOSIS — G56.02 LEFT CARPAL TUNNEL SYNDROME: ICD-10-CM

## 2023-05-05 DIAGNOSIS — G56.22 GUYON SYNDROME, LEFT: ICD-10-CM

## 2023-05-05 PROCEDURE — 99214 OFFICE O/P EST MOD 30 MIN: CPT | Performed by: NEUROLOGICAL SURGERY

## 2023-05-05 NOTE — PROGRESS NOTES
of lotions creams and other methodologies in order to impede the ongoing symptoms. It appears that she has never been evaluated in advanced centers for consideration of sympathetic chain treatment. History:     Past Medical History:   Diagnosis Date    Anxiety     Attention-deficit hyperactivity disorder 06/04/2021    Chronic low back pain     hx bulgin disc, used to see pain mgmt in [place]    COVID-19 01/2022    Headache, nausea, sinus congestion, achey, fever, chills  X1 week. repeat 3/2023    Depression     Endometriosis     Fibromyalgia 02/05/2020    Hx lyrica, no longer, also no longer taking Gabapentin, but still keeps on med list    Gastroesophageal reflux disease without esophagitis 04/25/2021    HPV (human papilloma virus) anogenital infection     Hyperhidrosis     PTSD (post-traumatic stress disorder)     Under care of team 11/18/2022    NEUROLOGY - DR. MOREJON - last visit 11/2022    Under care of team 11/18/2022    NEUROSURGERY - DR. ANDERSON - last visit 10/2022    Wears contact lenses 11/18/2022    Wears glasses 11/18/2022    Wellness examination 11/18/2022    PCP - DR. MALLORY - last viait - 8/2022     Past Surgical History:   Procedure Laterality Date    CARPAL TUNNEL RELEASE Right 11/29/2022    ULNAR NERVE TRANSPOSITION AND GUYON, CARPAL TUNNEL RELEASE    CARPAL TUNNEL RELEASE Right 11/29/2022    ULNAR NERVE TRANSPOSITION AND GUYON, CARPAL TUNNEL RELEASE. performed by Sudhakar Roberts DO at [facility]  2008    COLPOSCOPY      2010, 2011 x 2     ENDOMETRIAL BIOPSY  01/31/2020    results were WNL     TONSILLECTOMY      WISDOM TOOTH EXTRACTION       Family History   Problem Relation Age of Onset    Depression Mother     Anxiety Disorder Mother     Depression Father     Ovarian Cancer Maternal Aunt         great aunt     ADHD Brother     Other Brother         autism     Osteoarthritis Maternal Grandmother     Cancer Maternal Grandfather         skin    COPD Maternal Grandfather     Lung Cancer

## 2023-06-05 ENCOUNTER — TELEPHONE (OUTPATIENT)
Dept: FAMILY MEDICINE CLINIC | Age: 33
End: 2023-06-05

## 2023-06-05 DIAGNOSIS — M79.603 PAIN OF UPPER EXTREMITY, UNSPECIFIED LATERALITY: Primary | ICD-10-CM

## 2023-06-05 NOTE — TELEPHONE ENCOUNTER
----- Message from MayteGodley, [place] sent at 6/5/2023  9:11 AM EDT -----  Subject: Referral Request    Reason for referral request? patient calling in to get referral to therapy   for arm/bicep pain, had surgery with dr. Mayra Coelho and is still having the   same pain. Provider patient wants to be referred to(if known):     Provider Phone Number(if known):     Additional Information for Provider?   ---------------------------------------------------------------------------  --------------  [address]    [phone]; OK to leave message on voicemail  ---------------------------------------------------------------------------  --------------

## 2023-06-19 ENCOUNTER — TELEMEDICINE (OUTPATIENT)
Dept: FAMILY MEDICINE CLINIC | Age: 33
End: 2023-06-19
Payer: OTHER GOVERNMENT

## 2023-06-19 DIAGNOSIS — M79.601 PAIN OF RIGHT UPPER EXTREMITY: Primary | ICD-10-CM

## 2023-06-19 PROCEDURE — 99213 OFFICE O/P EST LOW 20 MIN: CPT | Performed by: NURSE PRACTITIONER

## 2023-06-19 RX ORDER — ACETAMINOPHEN 500 MG
500 TABLET ORAL EVERY 6 HOURS PRN
COMMUNITY

## 2023-06-19 RX ORDER — METHOCARBAMOL 500 MG/1
500 TABLET, FILM COATED ORAL 4 TIMES DAILY
Qty: 20 TABLET | Refills: 0 | Status: SHIPPED | OUTPATIENT
Start: 2023-06-19 | End: 2023-06-24

## 2023-06-19 RX ORDER — IBUPROFEN 400 MG/1
400 TABLET ORAL EVERY 6 HOURS PRN
COMMUNITY

## 2023-06-19 ASSESSMENT — ENCOUNTER SYMPTOMS
DIARRHEA: 0
CONSTIPATION: 0
ABDOMINAL PAIN: 0
COLOR CHANGE: 0
SHORTNESS OF BREATH: 0
CHEST TIGHTNESS: 0

## 2023-06-19 NOTE — PROGRESS NOTES
exanthematous lesions or discoloration noted on facial skin         [] Abnormal -            Psychiatric:       [x] Normal Affect [x] Abnormal -tearful discussing frustrations with pain       [x] No Hallucinations    Other pertinent observable physical exam findings:-         On this date 6/19/2023 I have spent 15 minutes reviewing previous notes, test results and face to face (virtual) with the patient discussing the diagnosis and importance of compliance with the treatment plan as well as documenting on the day of the visit. Julio June, was evaluated through a synchronous (real-time) audio-video encounter. The patient (or guardian if applicable) is aware that this is a billable service, which includes applicable co-pays. This Virtual Visit was conducted with patient's (and/or legal guardian's) consent. Patient identification was verified, and a caregiver was present when appropriate.    The patient was located at Home: [address] [place] [address] Rehab Derian  Provider was located at [facility] (Appt Dept): CARRIE Mercer [address] [place] [address],  [address] [place] [address],[address]Th Floor, [facility]

## 2023-06-27 ENCOUNTER — HOSPITAL ENCOUNTER (OUTPATIENT)
Dept: PHYSICAL THERAPY | Age: 33
Setting detail: THERAPIES SERIES
Discharge: HOME | End: 2023-06-27
Payer: OTHER GOVERNMENT

## 2023-06-27 PROCEDURE — 97110 THERAPEUTIC EXERCISES: CPT

## 2023-06-27 PROCEDURE — 97161 PT EVAL LOW COMPLEX 20 MIN: CPT

## 2023-07-05 ENCOUNTER — HOSPITAL ENCOUNTER (OUTPATIENT)
Dept: PHYSICAL THERAPY | Age: 33
Setting detail: THERAPIES SERIES
Discharge: HOME | End: 2023-07-05
Payer: OTHER GOVERNMENT

## 2023-07-05 PROCEDURE — 97110 THERAPEUTIC EXERCISES: CPT

## 2023-07-05 NOTE — FLOWSHEET NOTE
[x] [address]  [address] [facility].  [phone]  F: [phone] [] [address] [facility]  [address]   [address]  P: [phone]  F: [phone] [] [address]  [address] [facility]  P: [phone]  F: [phone] [] [facility] [phone]  F: [phone] [] [address] [facility]  [address] [facility]   [address]   P: [phone]  F: [phone]  [] [address] [facility].   P: [phone]  F: [phone] [] [address] [facility]  [address]  P: [phone]  F: [phone] [] [address] [facility]  [address] [facility]  [place]: [phone]  F: [phone] [] [address]  [place]: [phone]  F: [phone]      Physical Therapy Daily Treatment Note    Date:  2023  Patient Name:  Sheryl Hawthorne    :  1990  MRN: [id number]  Physician: Fritz Fry MD          Insurance: [facility] No Dry needling or Traction   Medical Diagnosis: Pain of upper extremity, unspecified laterality M79.603            Rehab Codes: M25.512, M25.521, M79.603  Onset Date: 23                                 Next Dr.'s appt: Neurosurgeon 23  Visit# / total visits: 2/12    Cancels/No Shows: 0/0    Subjective:    Pain:  [x] Yes  [] No Location:R shoulder/ elbow  Pain Rating: (0-10 scale) 3/10  Pain altered Tx:  [] No  [] Yes  Action:  Comments: Patient reports less shoulder pain for past few days she was able to work without excessive pain.

## 2023-07-10 ENCOUNTER — HOSPITAL ENCOUNTER (OUTPATIENT)
Dept: PHYSICAL THERAPY | Age: 33
Setting detail: THERAPIES SERIES
Discharge: HOME | End: 2023-07-10
Payer: OTHER GOVERNMENT

## 2023-07-10 PROCEDURE — 97110 THERAPEUTIC EXERCISES: CPT

## 2023-07-10 PROCEDURE — 97035 APP MDLTY 1+ULTRASOUND EA 15: CPT

## 2023-07-12 ENCOUNTER — HOSPITAL ENCOUNTER (OUTPATIENT)
Dept: PHYSICAL THERAPY | Age: 33
Setting detail: THERAPIES SERIES
Discharge: HOME | End: 2023-07-12
Payer: OTHER GOVERNMENT

## 2023-07-12 NOTE — FLOWSHEET NOTE
[x] [address]  [address] [facility] [place] [facility].    [phone]  F: [phone]   [] [address]  [address]  P: [phone]  F: [phone]  [] [address]  [address]  P: [phone]  F: [phone] [] [facility]: [phone]  F: [phone]  [] [address]  [address] [facility]   [address]   [place]: [phone]  F: [phone]   [] [address] [facility]  [address] [facility] [address] [facility] [address]  [place]: [phone]   F: [phone]     Physical Therapy Cancel/No Show note    Date: 2023  Patient: Lory Terrell  : 1990  MRN: [id number]    Cancels/No Shows to date:     For today's appointment patient:    [x]  Cancelled    [] Rescheduled appointment    [] No-show     Reason given by patient:    []  Patient ill    []  Conflicting appointment    [] No transportation      [] Conflict with work    [] No reason given    [] Weather related    [] [id number]    [] Other:      Comments:  23 - Cx patient called to cancel, she is ill      [x] Next appointment was confirmed, previously    Electronically signed by: Romaine Urban PT

## 2023-07-18 ENCOUNTER — HOSPITAL ENCOUNTER (OUTPATIENT)
Dept: PHYSICAL THERAPY | Age: 33
Setting detail: THERAPIES SERIES
Discharge: HOME | End: 2023-07-18
Payer: OTHER GOVERNMENT

## 2023-07-18 PROCEDURE — 97110 THERAPEUTIC EXERCISES: CPT

## 2023-07-18 PROCEDURE — 97035 APP MDLTY 1+ULTRASOUND EA 15: CPT

## 2023-07-18 NOTE — FLOWSHEET NOTE
Pain: 4/10 R shoulder pain with ADLs. ? ROM:R shoulder flexion to 160 and ER to 70 degrees to improve over head reaching. ? Strength:5/5 R shoulder flexion, abduction, and ER to improve lifting and carrying. ? Function:UEFI score 78% functional or better to improve ADL function   Independent with Home Exercise Programs     LTG: (to be met in 12 treatments)  Patient is able to swim with 2/10 shoulder pain or less. Patient is able to play video games without biceps pain. Pt. Education:  [x] Yes  [] No  [x] Reviewed Prior HEP/Ed  Method of Education: [] Verbal  [] Demo  [] Written  Comprehension of Education:  [x] Verbalizes understanding. [x] Demonstrates understanding. [x] Needs review. [] Demonstrates/verbalizes HEP/Ed previously given. Access Code: [email or web address]  URL: [email or web address]  Date: 06/27/2023  Prepared by: Madeline Bettencourt     Exercises  - Supine Shoulder External Rotation with Dowel (Mirrored)  - 1 x daily - 4 x weekly - 3 sets - 10 reps  - Standing Shoulder Horizontal Abduction with Resistance  - 1 x daily - 4 x weekly - 3 sets - 10 reps  - Shoulder External Rotation with Anchored Resistance  - 1 x daily - 4 x weekly - 3 sets - 10 reps  - Shoulder Internal Rotation with Resistance  - 1 x daily - 4 x weekly - 3 sets - 10 reps  - Corner Pec Major Stretch  - 1 x daily - 4 x weekly - 3 sets - 10 reps    Plan: [x] Continue current frequency toward long and short term goals.     [x] Specific Instructions for subsequent treatments: R rotator cuff, scapular and biceps training  Per Neurosurgeon OT order 5/5/23:        Time In: 9089           Time Out: 8419      Electronically signed by:  Becca Pena PTA
actively driving/working/independent

## 2023-07-20 ENCOUNTER — HOSPITAL ENCOUNTER (OUTPATIENT)
Dept: PHYSICAL THERAPY | Age: 33
Setting detail: THERAPIES SERIES
Discharge: HOME | End: 2023-07-20
Payer: OTHER GOVERNMENT

## 2023-07-20 PROCEDURE — 97110 THERAPEUTIC EXERCISES: CPT

## 2023-07-20 PROCEDURE — 97035 APP MDLTY 1+ULTRASOUND EA 15: CPT

## 2023-07-25 ENCOUNTER — HOSPITAL ENCOUNTER (OUTPATIENT)
Dept: PHYSICAL THERAPY | Age: 33
Setting detail: THERAPIES SERIES
Discharge: HOME | End: 2023-07-25
Payer: OTHER GOVERNMENT

## 2023-07-25 PROCEDURE — 97110 THERAPEUTIC EXERCISES: CPT

## 2023-07-25 PROCEDURE — 97035 APP MDLTY 1+ULTRASOUND EA 15: CPT

## 2023-07-28 ENCOUNTER — HOSPITAL ENCOUNTER (OUTPATIENT)
Dept: PHYSICAL THERAPY | Age: 33
Setting detail: THERAPIES SERIES
Discharge: HOME | End: 2023-07-28
Payer: OTHER GOVERNMENT

## 2023-07-28 PROCEDURE — 97035 APP MDLTY 1+ULTRASOUND EA 15: CPT

## 2023-07-28 PROCEDURE — 97110 THERAPEUTIC EXERCISES: CPT

## 2023-07-28 NOTE — FLOWSHEET NOTE
[x] [facility]) [facility] &  Therapy  [address] [facility].  [phone]  F: [phone]     Physical Therapy Daily Treatment Note    Date:  2023  Patient Name:  Claudell Lister      :  1990    MRN: [id number]  Physician: Asher Velasco MD            Insurance: [facility] No Dry needling or Traction   Medical Diagnosis: Pain of upper extremity, unspecified laterality M79.603            Rehab Codes: M25.512, M25.521, M79.603  Onset Date: 23                                 Next 's appt: Neurosurgeon 23    Visit# / total visits:    Cancels/No Shows: 1/0    Subjective:    Pain:  [x] Yes  [] No Location: R antecubital fossa area   Pain Rating: (0-10 scale)  2/10   Pain altered Tx:  [] No  [] Yes  Action:  Comments: Patient reports she has had a good day. Today she had to perform a bird bedding change, which requires a lot of wiping down windows, and lifting the bedding. She also reports improved ability to lift 5 gallon buckets of water out of the sink and onto a cart. 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  She states that she had a sharp pinching feeling in the right shoulder, but it went away after resting for a few minutes    Objective:  Modalities:   Treatment Location  Left      Right                          Position    []          [x]  [] Supine    [] Prone   [] Side lying  [x] Sitting          Treatment Modality   2 Ultrasound: __1.0____W/cm2 x  ___4__min              [x] 1MHz   [] 3Mhz                      Duty Factor: [] 100%  [x] 50%   [] 20%                  Add: [] Lidex   [] Stim    [] Gel pad           Precautions:  Exercises:  Exercise Reps/ Time Weight/ Level Comments   UBE 3/3 min  Level 2          Seated      UT Stretch 3x30\"  R side   Levator Scap Stretch 3x30\"   R side         Standing       Bicep/Pec Stretch 3x30\"  Palm open - standing at

## 2023-07-31 ENCOUNTER — HOSPITAL ENCOUNTER (OUTPATIENT)
Dept: PHYSICAL THERAPY | Age: 33
Setting detail: THERAPIES SERIES
Discharge: HOME | End: 2023-07-31
Payer: OTHER GOVERNMENT

## 2023-07-31 NOTE — FLOWSHEET NOTE
[x] [address]  [address] [facility].    [phone]  F: [phone]   [] [address]  [address]   [address]  P: [phone]  F: [phone]  [] [address]  [address]  P: [phone]  F: [phone] [] [place]: [phone]  F: [phone]  [] [address] [facility]   [address]   [place]: [phone]  F: [phone]   [] [address] [facility]  [address] [facility] [address]  [place]: [phone]   F: [phone]     Physical Therapy Cancel/No Show note    Date: 2023  Patient: Kailyn De La Fuente  : 1990  MRN: [id number]    Cancels/No Shows to date:     For today's appointment patient:    [x]  Cancelled    [] Rescheduled appointment    [] No-show     Reason given by patient:    []  Patient ill    []  Conflicting appointment    [] No transportation      [] Conflict with work    [] No reason given    [] Weather related    [] [id number]    [] Other:      Comments:  Called patient to have appointment time moved down to 5:45 to accommodate for reduced staff.        [x] Next appointment was confirmed, previously    Electronically signed by: Mary Guillen, PT

## 2023-08-01 ENCOUNTER — APPOINTMENT (OUTPATIENT)
Dept: PHYSICAL THERAPY | Age: 33
End: 2023-08-01
Payer: OTHER GOVERNMENT

## 2023-08-02 ENCOUNTER — HOSPITAL ENCOUNTER (OUTPATIENT)
Dept: PHYSICAL THERAPY | Age: 33
Setting detail: THERAPIES SERIES
Discharge: HOME | End: 2023-08-02
Payer: OTHER GOVERNMENT

## 2023-08-02 PROCEDURE — 97110 THERAPEUTIC EXERCISES: CPT

## 2023-08-02 NOTE — FLOWSHEET NOTE
[x] [address]  [address] [facility] [place] [facility].  [phone]  F: [phone]     Physical Therapy Daily Treatment Note    Date:  2023  Patient Name:  Jerrica Toledo      :  1990    MRN: [id number]  Physician: Manny Olivier MD            Insurance: [facility] No Dry needling or Traction   Medical Diagnosis: Pain of upper extremity, unspecified laterality M79.603            Rehab Codes: M25.512, M25.521, M79.603  Onset Date: 23                                 Next 's appt: Neurosurgeon 23    Visit# / total visits:    Cancels/No Shows: 1    Subjective:    Pain:  [x] Yes  [] No Location: R antecubital fossa area   Pain Rating: (0-10 scale)  4/10   Pain altered Tx:  [] No  [] Yes  Action:  Comments:     Objective:  Modalities:   Treatment Location  Left      Right                          Position    []          [x]  [] Supine    [] Prone   [] Side lying  [x] Sitting          Treatment Modality   2 Ultrasound: __1.0____W/cm2 x  ___4__min              [x] 1MHz   [] 3Mhz                      Duty Factor: [] 100%  [x] 50%   [] 20%                  Add: [] Lidex   [] Stim    [] Gel pad           Precautions:  Exercises:  Exercise Reps/ Time Weight/ Level Comments   UBE 3/3 min  Level 2          Seated      UT Stretch 3x30\"  R side   Levator Scap Stretch 3x30\"   R side         Standing       Bicep/Pec Stretch 3x30\"  Palm open - standing at windowsill - turning body away         Rows 30x plum    Lat Ext 30x plum    Triceps  30x plum    Biceps 30x plum Biceps pain distal   ER 30x plum Biceps pain proximal   IR  30x plum          Shoulder flexion  20x 3 lb    Shoulder abduction  20x 3 lb    Reverse Shoulder rolls 20x 5 lb Increased weight 23   Bicep Curls 3x10 5 lb          Supine  on TG  Level 35    Shoulder Flexion  20x 3 lb    Chest Press 20x 3 lb Increased weight 23   Chest flies  20x 3 lb Increased weight 23               Prone on

## 2023-08-07 ENCOUNTER — HOSPITAL ENCOUNTER (OUTPATIENT)
Dept: PHYSICAL THERAPY | Age: 33
Setting detail: THERAPIES SERIES
Discharge: HOME | End: 2023-08-07
Payer: OTHER GOVERNMENT

## 2023-08-07 NOTE — FLOWSHEET NOTE
[x] [facility] &  Therapy  [address] [facility] [place] [facility].    [phone]  F: [phone]     Physical Therapy Cancel/No Show note    Date: 2023  Patient: Clovis Milton  : 1990  MRN: [id number]    Cancels/No Shows to date: 3/0    For today's appointment patient:    [x]  Cancelled    [] Rescheduled appointment    [] No-show     Reason given by patient:    []  Patient ill    []  Conflicting appointment    [] No transportation      [] Conflict with work    [] No reason given    [] Weather related    [] COVID-19    [x] Other:      Comments:  \"has to take grandfather to ED\"      [x] Next appointment was confirmed, previously    Electronically signed by: Dennis Bolton, PTA

## 2023-08-09 ENCOUNTER — HOSPITAL ENCOUNTER (OUTPATIENT)
Dept: PHYSICAL THERAPY | Age: 33
Setting detail: THERAPIES SERIES
Discharge: HOME | End: 2023-08-09
Payer: OTHER GOVERNMENT

## 2023-08-09 PROCEDURE — 97110 THERAPEUTIC EXERCISES: CPT

## 2023-08-09 NOTE — FLOWSHEET NOTE
[x] [facility]) [facility] &  Therapy  [address] [facility].  [phone]  F: [phone]     Physical Therapy Daily Treatment Note    Date:  2023  Patient Name:  Giacomo Poole      :  1990    MRN: [id number]  Physician: Lisa Chan MD            Insurance: [facility] No Dry needling or Traction   Medical Diagnosis: Pain of upper extremity, unspecified laterality M79.603            Rehab Codes: M25.512, M25.521, M79.603  Onset Date: 23                                 Next 's appt: Neurosurgeon 23    Visit# / total visits:    Cancels/No Shows: 1/0    Subjective:    Pain:  [x] Yes  [] No Location: R mid bicep  Pain Rating: (0-10 scale)  4.5/10   Pain altered Tx:  [] No  [] Yes  Action:  Comments: patient reports she was able to carry a couple bags of 40 lb dog food without increased pain for the first time in a while. Was pleased with this. Went swimming yesterday and feeling a little sore due to this.      Objective:  Modalities:   Treatment Location  Left      Right                          Position    []          [x]  [] Supine    [] Prone   [] Side lying  [x] Sitting          Treatment Modality   2 Ultrasound: __1.0____W/cm2 x  ___4__min              [x] 1MHz   [] 3Mhz                      Duty Factor: [] 100%  [x] 50%   [] 20%                  Add: [] Lidex   [] Stim    [] Gel pad           Precautions:  Exercises:  Exercise Reps/ Time Weight/ Level Comments   UBE 3/3 min  Level 2          Seated      UT Stretch 3x30\"  R side   Levator Scap Stretch 3x30\"   R side         Standing       Bicep/Pec Stretch 3x30\"  Palm open - standing at windowsill - turning body away         Rows 30x plum    Lat Ext 30x plum    Triceps  30x plum    Biceps 30x plum Biceps pain distal   ER 30x plum Biceps pain proximal   IR  30x plum    Horizontal Abduction 20x Lime Added 23         Shoulder flexion  20x 3 lb    Shoulder abduction  20x 3 lb    Reverse Shoulder

## 2023-08-15 ENCOUNTER — TELEMEDICINE (OUTPATIENT)
Dept: FAMILY MEDICINE CLINIC | Age: 33
End: 2023-08-15
Payer: OTHER GOVERNMENT

## 2023-08-15 DIAGNOSIS — F43.21 GRIEF REACTION: Primary | ICD-10-CM

## 2023-08-15 DIAGNOSIS — F32.0 CURRENT MILD EPISODE OF MAJOR DEPRESSIVE DISORDER WITHOUT PRIOR EPISODE (HCC): ICD-10-CM

## 2023-08-15 PROCEDURE — 99214 OFFICE O/P EST MOD 30 MIN: CPT | Performed by: FAMILY MEDICINE

## 2023-08-15 RX ORDER — CITALOPRAM 20 MG/1
20 TABLET ORAL DAILY
Qty: 30 TABLET | Refills: 5 | Status: SHIPPED | OUTPATIENT
Start: 2023-08-15

## 2023-08-15 RX ORDER — ALPRAZOLAM 0.5 MG/1
0.5 TABLET ORAL 3 TIMES DAILY PRN
Qty: 30 TABLET | Refills: 0 | Status: SHIPPED | OUTPATIENT
Start: 2023-08-15 | End: 2023-09-14

## 2023-08-15 ASSESSMENT — PATIENT HEALTH QUESTIONNAIRE - PHQ9
SUM OF ALL RESPONSES TO PHQ9 QUESTIONS 1 & 2: 0
SUM OF ALL RESPONSES TO PHQ QUESTIONS 1-9: 0
2. FEELING DOWN, DEPRESSED OR HOPELESS: 0
1. LITTLE INTEREST OR PLEASURE IN DOING THINGS: 0

## 2023-08-15 NOTE — PROGRESS NOTES
Goldy Vega (:  1990) is a Established patient, presenting virtually for evaluation of the following:    Assessment & Plan   Below is the assessment and plan developed based on review of pertinent history, physical exam, labs, studies, and medications. 1. Grief reaction  -     citalopram (CELEXA) 20 MG tablet; Take 1 tablet by mouth daily, Disp-30 tablet, R-5Normal  -     ALPRAZolam (XANAX) 0.5 MG tablet; Take 1 tablet by mouth 3 times daily as needed for Sleep or Anxiety for up to 30 days. Max Daily Amount: 1.5 mg, Disp-30 tablet, R-0Normal  2. Current mild episode of major depressive disorder without prior episode (HCC)  -     citalopram (CELEXA) 20 MG tablet; Take 1 tablet by mouth daily, Disp-30 tablet, R-5Normal  -     ALPRAZolam (XANAX) 0.5 MG tablet; Take 1 tablet by mouth 3 times daily as needed for Sleep or Anxiety for up to 30 days. Max Daily Amount: 1.5 mg, Disp-30 tablet, R-0Normal    Return if symptoms worsen or fail to improve.      Work starting today returning on   Subjective   HPI  Review of Systems   She is being seen today stating that she is suffering from a lot of grief and depression she had 4 deaths in her family in the spring and then some other strenuous stressful depressive issues and then just recently her 12month-old puppy injured itself and then subsequently ended up passing away so she is just having a horrible time focusing and dealing with things and was unable to go to work today where she works at a [employer] office she is tearful she is not sleeping well and she needs something to help her and does note to be home from work    Objective   Patient-Reported Vitals  No data recorded     Physical Exam  [INSTRUCTIONS:  \"[x]\" Indicates a positive item  \"[]\" Indicates a negative item  -- DELETE ALL ITEMS NOT EXAMINED]    Constitutional: [x] Appears well-developed and well-nourished [x] No apparent distress      [] Abnormal -     Mental status: [x] Alert and awake  [x] Surgeon: BETH Agarwal M.D., Boni Pham MD     Preoperative diagnosis:  Deviated nasal septum  Bilateral inferior turbinate hypertrophy     Procedure:  Septoplasty  Submucous resection of inferior turbinates     Postoperative diagnosis:  Same.     Blood Loss:  Minimal     Specimen(s):  Nasal bone and cartilage, portions of bilateral inferior turbinates     Complications:  None     Indications:  This patient has a long history of severe nasal obstruction, drainage and post nasal drip.  The patient has been treated extensively with steroids and 4 courses of antibiotics.  In spite of continuous steroids and antibiotics antihistamines and decongestants the patient has persistent symptoms. The patient has been treated with steroid nasal sprays and saline sinus irrigation. The patient´s CT scan demonstrated severe septal deviation and turbinate hypertrophy.  Because of failure to respond to continued medical therapy including allergic management the patient was brought to the operating room for definitive therapy.     Anesthesia: General     Blood Administered: None     Grafts/Implants: None     Description of procedure:  The patient was taken to the operating room.  After being placed supine on the operating room table, general endotracheal anesthesia was induced.  The nose was then decongested with Afrin.  The anterior rhinoscopy revealed severe septal deformity consisting of cartilaginous and bony deformity.  First, lidocaine 1% with epinephrine 1:100,000 was injected into the nasal mucosa bilaterally.  After maximal time was allowed for the vasoconstrictive effect, a left caudal incision was utilized.  Mucoperichondrial flaps were raised.  A ventral strip of cartilage was removed.  The bony cartilaginous articulation was disarticulated.  We aforemeentioned bony obstruction was  from the overlying mucosa.  Scissors were used to divide the perpendicular plate.  The bone spur was then removed in its  entirety.  The anterior septum was then scored to relieve part of the caudal obstruction.  A Rolfe chisel was used to remove a portion of the deviated maxillary crest, which is causing obstruction.  These maneuvers successfully relieved the patient's nasal airway obstruction.  The septoplasty was then closed in the following manner.  Interrupted 3-0 chromic stitches were used to close the caudal incision; 4-0 plain gut sutures were then used reapproximate to mucosal flaps.   The inferior turbinates were then injected with Xylocaine with epinephrine.  A submucous resection of the turbinates was carried out using a microdebrider.  Hemostasis was found to be satisfactory.  Outfracture of the turbinates was also done.  Bilateral rapid Rhino packs were placed and inflated.  The patient tolerated the procedure well.

## 2023-08-16 ENCOUNTER — HOSPITAL ENCOUNTER (OUTPATIENT)
Dept: PHYSICAL THERAPY | Age: 33
Setting detail: THERAPIES SERIES
Discharge: HOME | End: 2023-08-16
Payer: OTHER GOVERNMENT

## 2023-08-16 NOTE — FLOWSHEET NOTE
[x] [facility] ([facility]) [facility] &  Therapy  [address] [facility].    [phone]  F: [phone]     Physical Therapy Cancel/No Show note    Date: 2023  Patient: Chely La  : 1990  MRN: [id number]    Cancels/No Shows to date:     For today's appointment patient:    [x]  Cancelled    [] Rescheduled appointment    [] No-show     Reason given by patient:    []  Patient ill    []  Conflicting appointment    [] No transportation      [] Conflict with work    [] No reason given    [] Weather related    [] [id number]    [x] Other:      Comments:  23 CX due to lost kitten after surgery, having a hard time will be at next appt      [x] Next appointment was confirmed,     Electronically signed by: Mya Najera PTA

## 2023-08-21 ENCOUNTER — APPOINTMENT (OUTPATIENT)
Dept: PHYSICAL THERAPY | Age: 33
End: 2023-08-21
Payer: OTHER GOVERNMENT

## 2023-08-23 ENCOUNTER — HOSPITAL ENCOUNTER (OUTPATIENT)
Dept: PHYSICAL THERAPY | Age: 33
Setting detail: THERAPIES SERIES
Discharge: HOME | End: 2023-08-23
Payer: OTHER GOVERNMENT

## 2023-08-23 ENCOUNTER — APPOINTMENT (OUTPATIENT)
Dept: PHYSICAL THERAPY | Age: 33
End: 2023-08-23
Payer: OTHER GOVERNMENT

## 2023-08-23 PROCEDURE — 97110 THERAPEUTIC EXERCISES: CPT

## 2023-08-23 NOTE — FLOWSHEET NOTE
[x] [facility]) [facility] &  Therapy  [address] [facility].  [phone]  F: [phone]     Physical Therapy Daily Treatment Note    Date:  2023  Patient Name:  Alice Jacome      :  1990    MRN: [id number]  Physician: Demetrius Matta MD            Insurance: [facility] No Dry needling or Traction   Medical Diagnosis: Pain of upper extremity, unspecified laterality M79.603            Rehab Codes: M25.512, M25.521, M79.603  Onset Date: 23                                 Next 's appt: Neurosurgeon 23    Visit# / total visits: 10/12   Cancels/No Shows: 1/0    Subjective:    Pain:  [x] Yes  [] No Location: R mid bicep  Pain Rating: (0-10 scale)  0/10   Pain altered Tx:  [] No  [] Yes  Action:  Comments: patient reports that her shoulder has been doing relatively well since she was last seen in PT. Has been off work due to a personal leave.      Objective:  Modalities:   Treatment Location  Left      Right                          Position    []          [x]  [] Supine    [] Prone   [] Side lying  [x] Sitting          Treatment Modality        Precautions:  Exercises:  Exercise Reps/ Time Weight/ Level Comments   UBE 3/3 min  Level 2          Seated      UT Stretch 3x30\"  R side   Levator Scap Stretch 3x30\"   R side         Standing       Bicep/Pec Stretch 3x30\"  Palm open - standing at windowsill - turning body away         Rows 30x plum    Lat Ext 30x plum    Triceps  30x plum    Biceps 30x plum Biceps pain distal   ER 30x plum Biceps pain proximal   IR  30x plum    Horizontal Abduction 20x Lime Added 23         Shoulder flexion  20x 3 lb    Shoulder abduction  20x 3 lb    Reverse Shoulder rolls 20x 6 lb Increased weight 23   Overhead Press 20x 3 lb Added 23         Box Carry 5 lap  17 lb 100' Added 23   Overhead Carry 3 laps  10 lb '      Bent over Row 10x 10 lb Added 23   Tricep Kickback 2x10 5 lb Added 23         Supine  on

## 2023-08-30 ENCOUNTER — HOSPITAL ENCOUNTER (OUTPATIENT)
Dept: PHYSICAL THERAPY | Age: 33
Setting detail: THERAPIES SERIES
Discharge: HOME | End: 2023-08-30
Payer: OTHER GOVERNMENT

## 2023-08-30 PROCEDURE — 97110 THERAPEUTIC EXERCISES: CPT

## 2023-09-18 ENCOUNTER — HOSPITAL ENCOUNTER (OUTPATIENT)
Dept: PHYSICAL THERAPY | Age: 33
Setting detail: THERAPIES SERIES
End: 2023-09-18
Payer: OTHER GOVERNMENT

## 2023-09-21 ENCOUNTER — HOSPITAL ENCOUNTER (OUTPATIENT)
Dept: PHYSICAL THERAPY | Age: 33
Setting detail: THERAPIES SERIES
Discharge: HOME | End: 2023-09-21
Payer: OTHER GOVERNMENT

## 2023-09-21 PROCEDURE — 97110 THERAPEUTIC EXERCISES: CPT

## 2023-09-21 NOTE — FLOWSHEET NOTE
[x] [facility]) [facility] &  Therapy  [address] [facility].  [phone]  F: [phone]     Physical Therapy Daily Treatment Note    Date:  2023  Patient Name:  Miok Grady      :  1990    MRN: [id number]  Physician: Magy Villa MD            Insurance: [facility] No Dry needling or Traction   Medical Diagnosis: Pain of upper extremity, unspecified laterality M79.603            Rehab Codes: M25.512, M25.521, M79.603  Onset Date: 23                                 Next 's appt: Neurosurgeon 23    Visit# / total visits:    Cancels/No Shows: 1/0    Subjective:    Pain:  [x] Yes  [] No Location: R mid bicep  Pain Rating: (0-10 scale) 0/10   Pain altered Tx:  [] No  [] Yes  Action:  Comments: patient reports that her shoulder has been doing really well overall. Does her stretches occasionally when she has some intermittent. 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       Objective:  Modalities:   Treatment Location  Left      Right                          Position    []          [x]  [] Supine    [] Prone   [] Side lying  [x] Sitting          Treatment Modality        Precautions:  Exercises:  Exercise Reps/ Time Weight/ Level Comments   Seated      UT Stretch 3x30\"  R side   Levator Scap Stretch 3x30\"   R side         Standing       Bicep/Pec Stretch 3x30\"  Palm open - standing at windowsill - turning body away         Rows 30x plum    Lat Ext 30x plum    Triceps  30x plum    Biceps 30x plum Biceps pain distal   ER 30x plum Biceps pain proximal   IR  30x plum    Horizontal Abduction 30x Lime Increased reps 23         Shoulder flexion  20x 3 lb    Shoulder abduction  20x 3 lb    Reverse Shoulder rolls 20x 6 lb Increased weight 23   Overhead Press 20x 3 lb Added 23   Bicep Curls 3x10 5 lb Added 23         Box Carry 5 lap  17 lb 100' Added 23   Overhead Carry 3 laps  10 lb '      Bent over Row 3x10 10 lb

## 2023-09-27 ENCOUNTER — OFFICE VISIT (OUTPATIENT)
Dept: NEUROSURGERY | Age: 33
End: 2023-09-27
Payer: OTHER GOVERNMENT

## 2023-09-27 VITALS
BODY MASS INDEX: 28.52 KG/M2 | SYSTOLIC BLOOD PRESSURE: 112 MMHG | HEIGHT: 62 IN | HEART RATE: 73 BPM | WEIGHT: 155 LBS | OXYGEN SATURATION: 99 % | TEMPERATURE: 97.2 F | DIASTOLIC BLOOD PRESSURE: 75 MMHG

## 2023-09-27 DIAGNOSIS — G56.02 LEFT CARPAL TUNNEL SYNDROME: ICD-10-CM

## 2023-09-27 DIAGNOSIS — G56.21 CUBITAL CANAL COMPRESSION SYNDROME, RIGHT: ICD-10-CM

## 2023-09-27 DIAGNOSIS — G56.01 RIGHT CARPAL TUNNEL SYNDROME: Primary | ICD-10-CM

## 2023-09-27 DIAGNOSIS — G56.21 GUYON SYNDROME, RIGHT: ICD-10-CM

## 2023-09-27 PROCEDURE — 99213 OFFICE O/P EST LOW 20 MIN: CPT | Performed by: NEUROLOGICAL SURGERY

## 2023-09-27 NOTE — PROGRESS NOTES
[address] [place] [address] [address]  [facility] [address] [place] [address], [address] [place] [id number]  Dept: [phone]    Patient:  Alcie Jacome  YOB: 1990  Date: 9/27/23    The patient is a 26 y.o. female who presents today for consult of the following problems:     Chief Complaint   Patient presents with    Follow-up     4 months; Right carpal tunnel syndrome and right elbow. Hyperhidrosis is both hands. HPI:     Alice Jacome is a 26 y.o. female on whom neurosurgical consultation was requested by Demetrius Matta MD for management of hyperhidrosis as well as ulnar and median nerve neuropathy. Complete resolution of numbness in the right upper extremity with no distinct weakness. Did have some pain over the pisiform on the right side along with some discomfort of the elbow but has significant improved. He is changing jobs from working at [employer] to working at [employer] mainly due to issues related to her management. She is still having some numbness in the median nerve distribution of the left hand but this appears to be relatively mild and she has stopped wearing the splints because they actually made her symptoms worse at night. Her primary issue at this point is she still has a significant mount of hyperhidrosis of both hands which she states is quite bothersome to her on a routine basis. Raquel Dunn History:     Past Medical History:   Diagnosis Date    Anxiety     Attention-deficit hyperactivity disorder 06/04/2021    Chronic low back pain     hx bulgin disc, used to see pain mgmt in [place]    COVID-19 01/2022    Headache, nausea, sinus congestion, achey, fever, chills  X1 week.  repeat 3/2023    Depression     Endometriosis     Fibromyalgia 02/05/2020    Hx lyrica, no longer, also no longer taking Gabapentin, but still keeps on med list    Gastroesophageal reflux disease without esophagitis 04/25/2021    HPV

## 2023-10-20 ENCOUNTER — OFFICE VISIT (OUTPATIENT)
Dept: NEUROSURGERY | Age: 33
End: 2023-10-20
Payer: OTHER GOVERNMENT

## 2023-10-20 VITALS
BODY MASS INDEX: 28.52 KG/M2 | TEMPERATURE: 98.6 F | OXYGEN SATURATION: 98 % | DIASTOLIC BLOOD PRESSURE: 78 MMHG | HEART RATE: 88 BPM | HEIGHT: 62 IN | SYSTOLIC BLOOD PRESSURE: 110 MMHG | WEIGHT: 155 LBS

## 2023-10-20 DIAGNOSIS — L74.512 HYPERHIDROSIS OF HANDS: Primary | ICD-10-CM

## 2023-10-20 PROCEDURE — 99214 OFFICE O/P EST MOD 30 MIN: CPT | Performed by: NEUROLOGICAL SURGERY

## 2023-10-20 NOTE — PROGRESS NOTES
Department of Neurosurgery                                                 New patient clinic note      Reason for Consult:  hyperhydrosis  Requesting Physician:  Silviano   Neurosurgeon: Parveen Hunt DO      History Obtained From:  patient    CHIEF COMPLAINT:         Chief Complaint   Patient presents with    Other     Follow up of right carpal tunnel syndrome. Pt is worried about sweating profusely on hands and feet. HISTORY OF PRESENT ILLNESS:       The patient is a 26 y.o. female who presents hyperhydrosis . she has suffered from this since she was a teenager. Several members of her family also has this diagnosis. She was referred to me by Dr. Viola Marte. She has overactive sweating in most of her body but the worst is her hands. She has tried multiple creams other treatments without much improvement. She did not try Botox. PAST MEDICAL HISTORY :       Past Medical History:        Diagnosis Date    Anxiety     Attention-deficit hyperactivity disorder 06/04/2021    Chronic low back pain     hx bulgin disc, used to see pain mgmt in [place]    COVID-19 01/2022    Headache, nausea, sinus congestion, achey, fever, chills  X1 week. repeat 3/2023    Depression     Endometriosis     Fibromyalgia 02/05/2020    Hx lyrica, no longer, also no longer taking Gabapentin, but still keeps on med list    Gastroesophageal reflux disease without esophagitis 04/25/2021    HPV (human papilloma virus) anogenital infection     Hyperhidrosis     PTSD (post-traumatic stress disorder)     Under care of team 11/18/2022    NEUROLOGY - DR. MOREJON - last visit 11/2022    Under care of team 11/18/2022    NEUROSURGERY - DR. ANDERSON - last visit 10/2022    Wears contact lenses 11/18/2022    Wears glasses 11/18/2022    Wellness examination 11/18/2022    PCP - DR. MALLORY - last viait - 8/2022       Past Surgical History:        Procedure Laterality Date    CARPAL TUNNEL RELEASE Right 11/29/2022    ULNAR NERVE TRANSPOSITION AND [place]

## 2023-10-27 ENCOUNTER — HOSPITAL ENCOUNTER (EMERGENCY)
Facility: CLINIC | Age: 33
Discharge: HOME | End: 2023-10-27
Attending: EMERGENCY MEDICINE
Payer: OTHER GOVERNMENT

## 2023-10-27 VITALS
HEART RATE: 76 BPM | DIASTOLIC BLOOD PRESSURE: 76 MMHG | SYSTOLIC BLOOD PRESSURE: 113 MMHG | OXYGEN SATURATION: 97 % | WEIGHT: 148 LBS | HEIGHT: 62 IN | TEMPERATURE: 98.4 F | RESPIRATION RATE: 17 BRPM | BODY MASS INDEX: 27.23 KG/M2

## 2023-10-27 DIAGNOSIS — J02.9 ACUTE PHARYNGITIS, UNSPECIFIED ETIOLOGY: Primary | ICD-10-CM

## 2023-10-27 PROCEDURE — 99283 EMERGENCY DEPT VISIT LOW MDM: CPT

## 2023-10-27 RX ORDER — PREDNISONE 20 MG/1
20 TABLET ORAL 2 TIMES DAILY
Qty: 10 TABLET | Refills: 0 | Status: SHIPPED | OUTPATIENT
Start: 2023-10-27 | End: 2023-11-01

## 2023-10-27 RX ORDER — AZITHROMYCIN 250 MG/1
TABLET, FILM COATED ORAL
Qty: 1 PACKET | Refills: 0 | Status: SHIPPED | OUTPATIENT
Start: 2023-10-27

## 2023-10-27 ASSESSMENT — ENCOUNTER SYMPTOMS
VOICE CHANGE: 1
SORE THROAT: 1

## 2023-10-27 ASSESSMENT — PAIN SCALES - WONG BAKER: WONGBAKER_NUMERICALRESPONSE: 2

## 2023-10-27 ASSESSMENT — PAIN - FUNCTIONAL ASSESSMENT: PAIN_FUNCTIONAL_ASSESSMENT: WONG-BAKER FACES

## 2023-12-08 ENCOUNTER — TELEMEDICINE (OUTPATIENT)
Dept: FAMILY MEDICINE CLINIC | Age: 33
End: 2023-12-08
Payer: OTHER GOVERNMENT

## 2023-12-08 DIAGNOSIS — F41.0 PANIC ATTACK: Primary | ICD-10-CM

## 2023-12-08 PROCEDURE — 99213 OFFICE O/P EST LOW 20 MIN: CPT | Performed by: FAMILY MEDICINE

## 2023-12-08 RX ORDER — ALPRAZOLAM 0.5 MG/1
0.5 TABLET ORAL NIGHTLY PRN
Qty: 15 TABLET | Refills: 0 | Status: SHIPPED | OUTPATIENT
Start: 2023-12-08 | End: 2023-12-29

## 2023-12-08 ASSESSMENT — COLUMBIA-SUICIDE SEVERITY RATING SCALE - C-SSRS
3. HAVE YOU BEEN THINKING ABOUT HOW YOU MIGHT KILL YOURSELF?: NO
7. DID THIS OCCUR IN THE LAST THREE MONTHS: NO
5. HAVE YOU STARTED TO WORK OUT OR WORKED OUT THE DETAILS OF HOW TO KILL YOURSELF? DO YOU INTEND TO CARRY OUT THIS PLAN?: NO
4. HAVE YOU HAD THESE THOUGHTS AND HAD SOME INTENTION OF ACTING ON THEM?: NO

## 2023-12-08 ASSESSMENT — PATIENT HEALTH QUESTIONNAIRE - PHQ9
SUM OF ALL RESPONSES TO PHQ9 QUESTIONS 1 & 2: 0
6. FEELING BAD ABOUT YOURSELF - OR THAT YOU ARE A FAILURE OR HAVE LET YOURSELF OR YOUR FAMILY DOWN: 0
SUM OF ALL RESPONSES TO PHQ QUESTIONS 1-9: 0
10. IF YOU CHECKED OFF ANY PROBLEMS, HOW DIFFICULT HAVE THESE PROBLEMS MADE IT FOR YOU TO DO YOUR WORK, TAKE CARE OF THINGS AT HOME, OR GET ALONG WITH OTHER PEOPLE: 0
1. LITTLE INTEREST OR PLEASURE IN DOING THINGS: 0
9. THOUGHTS THAT YOU WOULD BE BETTER OFF DEAD, OR OF HURTING YOURSELF: 0
3. TROUBLE FALLING OR STAYING ASLEEP: 0
7. TROUBLE CONCENTRATING ON THINGS, SUCH AS READING THE NEWSPAPER OR WATCHING TELEVISION: 0
8. MOVING OR SPEAKING SO SLOWLY THAT OTHER PEOPLE COULD HAVE NOTICED. OR THE OPPOSITE, BEING SO FIGETY OR RESTLESS THAT YOU HAVE BEEN MOVING AROUND A LOT MORE THAN USUAL: 0
5. POOR APPETITE OR OVEREATING: 0
4. FEELING TIRED OR HAVING LITTLE ENERGY: 0
2. FEELING DOWN, DEPRESSED OR HOPELESS: 0
SUM OF ALL RESPONSES TO PHQ QUESTIONS 1-9: 0

## 2023-12-08 NOTE — PROGRESS NOTES
General FM note    TeleHealth encounter -- Audio/Visual (During [facility] public health emergency)    Treva Carlton is a 26 y.o. female who presents today for follow up on her  medical conditions as noted below. Treva Carlton is c/o of   Chief Complaint   Patient presents with    Anxiety     Panic attacks       Patient Active Problem List:     Anxiety and depression     PTSD (post-traumatic stress disorder)     History of low transverse  section     Chronic low back pain     Endometriosis     Fibromyalgia     Human papilloma virus (HPV) infection     Slow transit constipation     BMI 27.0-27.9,adult     Gastroesophageal reflux disease without esophagitis     Tear film insufficiency     Abnormal Pap smear of cervix     Insomnia related to another mental disorder     Attention-deficit hyperactivity disorder     Medial epicondylitis of elbow, right     Pain of right middle finger     Uses intrauterine device for birth control     Cubital tunnel syndrome, right     Carpal tunnel syndrome of right wrist     Past Medical History:   Diagnosis Date    Anxiety     Attention-deficit hyperactivity disorder 2021    Chronic low back pain     hx bulgin disc, used to see pain mgmt in [facility] 2022    Headache, nausea, sinus congestion, achey, fever, chills  X1 week. repeat 3/2023    Depression     Endometriosis     Fibromyalgia 2020    Hx lyrica, no longer, also no longer taking Gabapentin, but still keeps on med list    Gastroesophageal reflux disease without esophagitis 2021    HPV (human papilloma virus) anogenital infection     Hyperhidrosis     PTSD (post-traumatic stress disorder)     Under care of team 2022    NEUROLOGY - DR. MOREJON - last visit 2022    Under care of team 2022    NEUROSURGERY - DR. ANDERSON - last visit 10/2022    Wears contact lenses 2022    Wears glasses 2022    Wellness examination 2022    PCP - DR. MALLORY - last viait - 2022      Past

## 2024-08-03 ENCOUNTER — HOSPITAL ENCOUNTER (EMERGENCY)
Facility: CLINIC | Age: 34
Discharge: HOME | End: 2024-08-03
Attending: STUDENT IN AN ORGANIZED HEALTH CARE EDUCATION/TRAINING PROGRAM

## 2024-08-03 VITALS
DIASTOLIC BLOOD PRESSURE: 81 MMHG | SYSTOLIC BLOOD PRESSURE: 110 MMHG | HEIGHT: 62 IN | TEMPERATURE: 98.4 F | RESPIRATION RATE: 18 BRPM | HEART RATE: 77 BPM | WEIGHT: 146 LBS | BODY MASS INDEX: 26.87 KG/M2 | OXYGEN SATURATION: 98 %

## 2024-08-03 DIAGNOSIS — L08.9 PIERCED EAR INFECTION, RIGHT, INITIAL ENCOUNTER: Primary | ICD-10-CM

## 2024-08-03 DIAGNOSIS — H92.01 RIGHT EAR PAIN: ICD-10-CM

## 2024-08-03 DIAGNOSIS — S01.331A PIERCED EAR INFECTION, RIGHT, INITIAL ENCOUNTER: Primary | ICD-10-CM

## 2024-08-03 PROCEDURE — 6370000000 HC RX 637 (ALT 250 FOR IP): Performed by: STUDENT IN AN ORGANIZED HEALTH CARE EDUCATION/TRAINING PROGRAM

## 2024-08-03 PROCEDURE — 99283 EMERGENCY DEPT VISIT LOW MDM: CPT

## 2024-08-03 RX ORDER — IBUPROFEN 600 MG/1
600 TABLET ORAL EVERY 6 HOURS PRN
Qty: 21 TABLET | Refills: 0 | Status: SHIPPED | OUTPATIENT
Start: 2024-08-03 | End: 2024-08-10

## 2024-08-03 RX ORDER — IBUPROFEN 600 MG/1
600 TABLET ORAL ONCE
Status: COMPLETED | OUTPATIENT
Start: 2024-08-03 | End: 2024-08-03

## 2024-08-03 RX ORDER — CEPHALEXIN 500 MG/1
500 CAPSULE ORAL ONCE
Status: COMPLETED | OUTPATIENT
Start: 2024-08-03 | End: 2024-08-03

## 2024-08-03 RX ORDER — CEPHALEXIN 500 MG/1
500 CAPSULE ORAL 4 TIMES DAILY
Qty: 28 CAPSULE | Refills: 0 | Status: SHIPPED | OUTPATIENT
Start: 2024-08-03 | End: 2024-08-10

## 2024-08-03 RX ADMIN — IBUPROFEN 600 MG: 600 TABLET ORAL at 22:54

## 2024-08-03 RX ADMIN — CEPHALEXIN 500 MG: 500 CAPSULE ORAL at 22:54

## 2024-08-04 NOTE — ED PROVIDER NOTES
concerning symptoms or if the symptoms worsen in any way. The patient understands that at this time there is no evidence for a more malignant underlying process, but the patient also understands that early in the process of an illness or injury, an emergency department workup can be falsely reassuring. Routine discharge counseling was given, and the patient understands that worsening, changing or persistent symptoms should prompt an immediate call or follow up with their primary physician or return to the emergency department.     I have reviewed the disposition diagnosis with the patient and or their family/guardian. I have answered their questions and given discharge instructions. They voiced understanding of these instructions and did not have any further questions or complaints.    FINAL IMPRESSION      1. Pierced ear infection, right, initial encounter    2. Right ear pain          DISPOSITION / PLAN     DISPOSITION Decision To Discharge 08/03/2024 11:02:06 PM      PATIENT REFERRED TO:  Camila Fried MD  [address] [id number]  [phone]    Call   For Post Emergency Department Follow Up in 3-5 days, For wound re-check    [facility] ED  [address] [facility] [address]  [phone]    As needed, If symptoms worsen      DISCHARGE MEDICATIONS:  New Prescriptions    CEPHALEXIN (KEFLEX) 500 MG CAPSULE    Take 1 capsule by mouth 4 times daily for 7 days    IBUPROFEN (IBU) 600 MG TABLET    Take 1 tablet by mouth every 6 hours as needed for Pain       Dolly Jay DO  Emergency Medicine Physician    (Please note that portions of this note were completed with a voice recognition program.  Efforts were made to edit the dictations but occasionally words are mis-transcribed.)                      Dolly Jay DO  08/03/24 [id number]

## 2024-08-04 NOTE — ED NOTES
Pt presents to ED via private auto with c/o infected ear piercing, onset yesterday. Pt states she noticed redness and drainage around piercing. Pt afebrile, vitals stable.

## 2024-08-04 NOTE — DISCHARGE INSTRUCTIONS
SUMMARY OF YOUR VISIT    Today you were seen for right ear pain.  We discussed that your triggers piercing does appear infected.  I have started you on an antibiotic.  You received your first dose here in the emergency department.  I have called in a written prescription for this antibiotic and Motrin to your pharmacy.  Please  your prescription tomorrow and take your antibiotic as prescribed for the full duration.  I recommend you follow-up with your primary care provider in 3 to 5 days to ensure symptoms are improving.  If you have any new, changing or worsening symptoms including but not limited to worsening pain, fevers, spreading of pain or redness, or any other concerns I recommend you return to the emerged primary for reevaluation.    I recommend you do not put another earring in and allow piercing site to heal over.     Please continue to take your home medication as previously prescribed, I have made no changes to your home medications.        You can return to our or another Emergency Department as needed or for worsening symptoms of chest pain, shortness of breath, high fevers not relieved by acetaminophen (Tylenol) and/or ibuprofen (Motrin / Advil), chills, feeling of your heart fluttering or racing, persistent nausea and/or vomiting, vomiting up blood, blood in your stool, loss of consciousness, numbness, weakness or tingling in the arms or legs or change in color of the extremities, changes in mental status, persistent headache, blurry vision, loss of bladder / bowel control, if you are unable to follow up with your physician, or other any other care or concern.    Thank You!    On behalf of the Emergency Department staff and team, I would like to thank you for allowing us the opportunity to participate in your health care and evaluation today.

## 2024-08-27 RX ORDER — ALPRAZOLAM 0.5 MG
TABLET ORAL
COMMUNITY
Start: 2023-12-21

## 2024-11-26 ENCOUNTER — OFFICE VISIT (OUTPATIENT)
Dept: FAMILY MEDICINE CLINIC | Age: 34
End: 2024-11-26
Payer: OTHER GOVERNMENT

## 2024-11-26 VITALS
WEIGHT: 146.8 LBS | SYSTOLIC BLOOD PRESSURE: 126 MMHG | TEMPERATURE: 97.8 F | OXYGEN SATURATION: 99 % | RESPIRATION RATE: 18 BRPM | HEART RATE: 86 BPM | BODY MASS INDEX: 27.02 KG/M2 | HEIGHT: 62 IN | DIASTOLIC BLOOD PRESSURE: 84 MMHG

## 2024-11-26 DIAGNOSIS — M54.50 CHRONIC LOW BACK PAIN, UNSPECIFIED BACK PAIN LATERALITY, UNSPECIFIED WHETHER SCIATICA PRESENT: ICD-10-CM

## 2024-11-26 DIAGNOSIS — F32.A ANXIETY AND DEPRESSION: Primary | ICD-10-CM

## 2024-11-26 DIAGNOSIS — F41.9 ANXIETY AND DEPRESSION: Primary | ICD-10-CM

## 2024-11-26 DIAGNOSIS — M79.10 GENERALIZED MUSCLE ACHE: ICD-10-CM

## 2024-11-26 DIAGNOSIS — M25.50 GENERALIZED JOINT PAIN: ICD-10-CM

## 2024-11-26 DIAGNOSIS — G89.29 CHRONIC LOW BACK PAIN, UNSPECIFIED BACK PAIN LATERALITY, UNSPECIFIED WHETHER SCIATICA PRESENT: ICD-10-CM

## 2024-11-26 PROCEDURE — 99214 OFFICE O/P EST MOD 30 MIN: CPT | Performed by: NURSE PRACTITIONER

## 2024-11-26 RX ORDER — DULOXETIN HYDROCHLORIDE 30 MG/1
30 CAPSULE, DELAYED RELEASE ORAL DAILY
Qty: 30 CAPSULE | Refills: 3 | Status: SHIPPED | OUTPATIENT
Start: 2024-11-26

## 2024-11-26 SDOH — ECONOMIC STABILITY: FOOD INSECURITY: WITHIN THE PAST 12 MONTHS, YOU WORRIED THAT YOUR FOOD WOULD RUN OUT BEFORE YOU GOT MONEY TO BUY MORE.: NEVER TRUE

## 2024-11-26 SDOH — ECONOMIC STABILITY: FOOD INSECURITY: WITHIN THE PAST 12 MONTHS, THE FOOD YOU BOUGHT JUST DIDN'T LAST AND YOU DIDN'T HAVE MONEY TO GET MORE.: NEVER TRUE

## 2024-11-26 SDOH — ECONOMIC STABILITY: INCOME INSECURITY: HOW HARD IS IT FOR YOU TO PAY FOR THE VERY BASICS LIKE FOOD, HOUSING, MEDICAL CARE, AND HEATING?: NOT HARD AT ALL

## 2024-11-26 ASSESSMENT — ANXIETY QUESTIONNAIRES
5. BEING SO RESTLESS THAT IT IS HARD TO SIT STILL: MORE THAN HALF THE DAYS
1. FEELING NERVOUS, ANXIOUS, OR ON EDGE: SEVERAL DAYS
4. TROUBLE RELAXING: MORE THAN HALF THE DAYS
3. WORRYING TOO MUCH ABOUT DIFFERENT THINGS: MORE THAN HALF THE DAYS
6. BECOMING EASILY ANNOYED OR IRRITABLE: NEARLY EVERY DAY
7. FEELING AFRAID AS IF SOMETHING AWFUL MIGHT HAPPEN: SEVERAL DAYS
IF YOU CHECKED OFF ANY PROBLEMS ON THIS QUESTIONNAIRE, HOW DIFFICULT HAVE THESE PROBLEMS MADE IT FOR YOU TO DO YOUR WORK, TAKE CARE OF THINGS AT HOME, OR GET ALONG WITH OTHER PEOPLE: VERY DIFFICULT
2. NOT BEING ABLE TO STOP OR CONTROL WORRYING: SEVERAL DAYS
GAD7 TOTAL SCORE: 12

## 2024-11-26 ASSESSMENT — PATIENT HEALTH QUESTIONNAIRE - PHQ9
1. LITTLE INTEREST OR PLEASURE IN DOING THINGS: MORE THAN HALF THE DAYS
SUM OF ALL RESPONSES TO PHQ QUESTIONS 1-9: 12
7. TROUBLE CONCENTRATING ON THINGS, SUCH AS READING THE NEWSPAPER OR WATCHING TELEVISION: MORE THAN HALF THE DAYS
10. IF YOU CHECKED OFF ANY PROBLEMS, HOW DIFFICULT HAVE THESE PROBLEMS MADE IT FOR YOU TO DO YOUR WORK, TAKE CARE OF THINGS AT HOME, OR GET ALONG WITH OTHER PEOPLE: VERY DIFFICULT
4. FEELING TIRED OR HAVING LITTLE ENERGY: MORE THAN HALF THE DAYS
9. THOUGHTS THAT YOU WOULD BE BETTER OFF DEAD, OR OF HURTING YOURSELF: SEVERAL DAYS
2. FEELING DOWN, DEPRESSED OR HOPELESS: SEVERAL DAYS
SUM OF ALL RESPONSES TO PHQ QUESTIONS 1-9: 13
SUM OF ALL RESPONSES TO PHQ QUESTIONS 1-9: 13
5. POOR APPETITE OR OVEREATING: MORE THAN HALF THE DAYS
3. TROUBLE FALLING OR STAYING ASLEEP: MORE THAN HALF THE DAYS
6. FEELING BAD ABOUT YOURSELF - OR THAT YOU ARE A FAILURE OR HAVE LET YOURSELF OR YOUR FAMILY DOWN: SEVERAL DAYS
8. MOVING OR SPEAKING SO SLOWLY THAT OTHER PEOPLE COULD HAVE NOTICED. OR THE OPPOSITE, BEING SO FIGETY OR RESTLESS THAT YOU HAVE BEEN MOVING AROUND A LOT MORE THAN USUAL: NOT AT ALL
SUM OF ALL RESPONSES TO PHQ QUESTIONS 1-9: 13
SUM OF ALL RESPONSES TO PHQ9 QUESTIONS 1 & 2: 3

## 2024-11-26 ASSESSMENT — COLUMBIA-SUICIDE SEVERITY RATING SCALE - C-SSRS
5. HAVE YOU STARTED TO WORK OUT OR WORKED OUT THE DETAILS OF HOW TO KILL YOURSELF? DO YOU INTEND TO CARRY OUT THIS PLAN?: NO
2. HAVE YOU ACTUALLY HAD ANY THOUGHTS OF KILLING YOURSELF?: YES
7. DID THIS OCCUR IN THE LAST THREE MONTHS: NO
6. HAVE YOU EVER DONE ANYTHING, STARTED TO DO ANYTHING, OR PREPARED TO DO ANYTHING TO END YOUR LIFE?: YES
4. HAVE YOU HAD THESE THOUGHTS AND HAD SOME INTENTION OF ACTING ON THEM?: NO
1. WITHIN THE PAST MONTH, HAVE YOU WISHED YOU WERE DEAD OR WISHED YOU COULD GO TO SLEEP AND NOT WAKE UP?: YES
3. HAVE YOU BEEN THINKING ABOUT HOW YOU MIGHT KILL YOURSELF?: NO

## 2024-11-26 NOTE — PATIENT INSTRUCTIONS
Thank you for choosing [facility].  We know you have options when it comes to your healthcare; we appreciate that you chose us. Our goal is to provide exceptional  service and world class care to every patient.  You will be receiving a survey via email or text message asking for your feedback.  Please take a few minutes to share your thoughts about your recent visit. Your comments helps us understand what we do well and ways we can improve.  Thank you in advance for your valuable feedback.              New Updates for [facility]    Thank you for choosing [facility] to give you the best care! [facility] is always trying to think of new ways to help their patients. We are asking all patients to try out the new digital registration that is now available through the [facility]. Down load today!. Via the raj you're now able to update your personal and registration information prior to your upcoming appointment. This will save you time once you arrive at the office to check-in, not to mention your information remains safe!! Many other perks come from signing up for an account, such as:  Requesting refills  Scheduling an appointment  Completing an E-Visit  Sending a message to the office/provider  Having access to your medication list  Paying your bill/copay prior to your appointment  Scheduling your yearly mammogram  Review your test results    If you are not familiar the [facility], please ask one of us and we will be happy to answer any questions or help you set-up your account.

## 2024-11-26 NOTE — PROGRESS NOTES
[facility]  [facility]  [address]  [address]  [facility] [address]  Dept: [phone]    11/26/2024    CHIEF COMPLAINT    Chief Complaint   Patient presents with    Ear Injury     ED follow for keloid on the right ear    Headache     Patient complaining that her migraines are getting worse    Chronic Pain     All over body pain       HPI    Alem Craft is a 34 y.o. female who presents   Chief Complaint   Patient presents with    Ear Injury     ED follow for keloid on the right ear    Headache     Patient complaining that her migraines are getting worse    Chronic Pain     All over body pain     Appointment to f/u on ear injury. 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     Specialists:     Neurosurgery- Dr. Shore (carpal tunnel)   OBGYN- Dr. Askew   Psychology- Leida Escalante at [facility]    Right ear injury- symptoms resolved with antibiotics.     Anxiety/Depression- seeing counselor  for the last 2-4 years.   Not currently taking any medications.   Open to taking something new. Cymbalta was effective, but caused fatigue. Review of records shows last dose of 60 mg.    Anxiety/depression hx- Celexa and Cymbalta caused her to be very groggy in the AM. Zoloft and Wellbutrin did not seem to help her in the past.    PHQ-9 Total Score: 13 (11/26/2024 10:59 AM)  Thoughts that you would be better off dead, or of hurting yourself in some way: 1 (11/26/2024 10:59 AM)    Migraines- taking OTC medications. Taking Excedrin migraine which works only intermittently.  She believes that the Maxalt made her nerves tingle in an uncomfortable way     Vitals:    11/26/24 1055   BP: 126/84   Site: Left Upper Arm   Position: Sitting   Cuff Size: Large Adult   Pulse: 86   Resp: 18   Temp: 97.8 °F (36.6 °C)   TempSrc: Oral   SpO2: 99%   Weight: 66.6 kg (146 lb 12.8 oz)   Height: 1.575 m (5' 2\")       Wt Readings from Last 3 Encounters:

## 2024-12-03 ENCOUNTER — TELEPHONE (OUTPATIENT)
Age: 34
End: 2024-12-03

## 2024-12-15 ENCOUNTER — HOSPITAL ENCOUNTER (EMERGENCY)
Facility: CLINIC | Age: 34
Discharge: HOME | End: 2024-12-15
Attending: STUDENT IN AN ORGANIZED HEALTH CARE EDUCATION/TRAINING PROGRAM
Payer: OTHER GOVERNMENT

## 2024-12-15 VITALS
TEMPERATURE: 98.2 F | HEART RATE: 93 BPM | HEIGHT: 62 IN | SYSTOLIC BLOOD PRESSURE: 116 MMHG | BODY MASS INDEX: 26.68 KG/M2 | DIASTOLIC BLOOD PRESSURE: 79 MMHG | RESPIRATION RATE: 15 BRPM | OXYGEN SATURATION: 98 % | WEIGHT: 145 LBS

## 2024-12-15 DIAGNOSIS — J04.0 LARYNGITIS: Primary | ICD-10-CM

## 2024-12-15 PROCEDURE — 6370000000 HC RX 637 (ALT 250 FOR IP): Performed by: PHYSICIAN ASSISTANT

## 2024-12-15 PROCEDURE — 99283 EMERGENCY DEPT VISIT LOW MDM: CPT

## 2024-12-15 RX ORDER — PREDNISONE 20 MG/1
60 TABLET ORAL ONCE
Status: COMPLETED | OUTPATIENT
Start: 2024-12-15 | End: 2024-12-15

## 2024-12-15 RX ORDER — PREDNISONE 50 MG/1
50 TABLET ORAL DAILY
Qty: 5 TABLET | Refills: 0 | Status: SHIPPED | OUTPATIENT
Start: 2024-12-15 | End: 2024-12-20

## 2024-12-15 RX ADMIN — PREDNISONE 60 MG: 20 TABLET ORAL at 20:59

## 2024-12-15 ASSESSMENT — PAIN DESCRIPTION - PAIN TYPE: TYPE: ACUTE PAIN

## 2024-12-15 ASSESSMENT — PAIN - FUNCTIONAL ASSESSMENT: PAIN_FUNCTIONAL_ASSESSMENT: 0-10

## 2024-12-15 ASSESSMENT — PAIN DESCRIPTION - DESCRIPTORS: DESCRIPTORS: ACHING

## 2024-12-15 ASSESSMENT — PAIN DESCRIPTION - LOCATION: LOCATION: THROAT

## 2024-12-15 ASSESSMENT — PAIN DESCRIPTION - FREQUENCY: FREQUENCY: CONTINUOUS

## 2024-12-15 ASSESSMENT — PAIN SCALES - GENERAL: PAINLEVEL_OUTOF10: 8

## 2024-12-16 ENCOUNTER — TELEPHONE (OUTPATIENT)
Dept: FAMILY MEDICINE CLINIC | Age: 34
End: 2024-12-16

## 2024-12-16 DIAGNOSIS — J31.2 CHRONIC PHARYNGITIS: Primary | ICD-10-CM

## 2024-12-16 NOTE — ED NOTES
Pt presents to the ED via private auto accompanied by her SO. Pt states she has been experiencing laryngitis off and on for years. This episode has been for 3 days. Pt c/o sore throat and has no voice

## 2024-12-16 NOTE — TELEPHONE ENCOUNTER
Patient is having ongoing issues with pharyngitis and sore throat ,would like to know if referral for ENT can be placed in chart, patient has no preference on specialist

## 2024-12-16 NOTE — ED PROVIDER NOTES
[facility] EMERGENCY DEPARTMENT  EMERGENCY DEPARTMENT ENCOUNTER  INDEPENDENT ATTESTATION         1. Laryngitis          Pt Name: Alem Craft  MRN: [id number]  Birthdate 1990  Date of evaluation: 12/16/24    Alem Craft is a 34 y.o. female who presents with Laryngitis (X3 days)  .    Based on the medical record, the care appears appropriate. I was personally available for consultation in the Emergency Department.      FINAL IMPRESSION      1. Laryngitis          DISPOSITION / PLAN     DISPOSITION Decision To Discharge 12/15/2024 09:04:16 PM               PATIENT REFERRED TO:  Camila Fried MD  [address] [address]  [phone]    In 1 week      [facility] Emergency Department  [address] [address]  [phone]    For worsening symptoms, or any other concern      DISCHARGE MEDICATIONS:  Discharge Medication List as of 12/15/2024  9:04 PM        START taking these medications    Details   predniSONE (DELTASONE) 50 MG tablet Take 1 tablet by mouth daily for 5 days, Disp-5 tablet, R-0Normal             Dr. Dolly Jay DO   Attending Emergency Physician        Dolly Jay DO  12/16/24 1950    
use: Yes     Comment: \"5-8 drinks on a holiday.  1 or 2 drinks once a week.\"    Drug use: Yes     Types: Marijuana (Weed)     Comment: Edibles - \"One every couple days.\"  CBD and THC.         PHYSICAL EXAM       ED Triage Vitals [12/15/24 2009]   BP Systolic BP Percentile Diastolic BP Percentile Temp Temp Source Pulse Respirations SpO2   116/79 -- -- 98.2 °F (36.8 °C) Oral 93 15 98 %      Height Weight - Scale         1.575 m (5' 2\") 65.8 kg (145 lb)             Physical Exam   Nursing note and vitals reviewed.  Constitutional: Oriented to person, place, and time and well-developed, well-nourished.   Head: Normocephalic and atraumatic.   Ear: External ears normal.   Nose: Nose normal and midline.   Eyes: Conjunctivae and EOM are normal. Pupils are equal, round, and reactive to light.   Neck: Normal range of motion. Neck supple.   Throat: Posterior pharynx is without erythema or exudates, airway is patent, no swelling  Cardiovascular: Normal rate, regular rhythm, normal heart sounds and intact distal pulses.    Pulmonary/Chest: Effort normal and breath sounds normal. No respiratory distress. No wheezes. No rales. No chest tenderness.   Musculoskeletal: Normal range of motion.   Neurological: Alert and oriented to person, place, and time. GCS score is 15.   Skin: Skin is warm and dry. No rash noted. No erythema. No pallor.   Psychiatric: Mood, memory, affect and judgment normal.       All other labs were within normal range or not returned as of this dictation.    EMERGENCY DEPARTMENT COURSE and DIFFERENTIAL DIAGNOSIS/MDM:       1)  Number and Complexity of Problems  Problem List This Visit:   Chief Complaint   Patient presents with    Laryngitis     X3 days          2)  Data Reviewed        LABS:  Labs Reviewed - No data to display    RADIOLOGY:   All plain film, CT, MRI, and formal ultrasound images (except ED bedside ultrasound) are read by the radiologist  No orders to display       No results found.      3)

## 2024-12-20 ENCOUNTER — TELEPHONE (OUTPATIENT)
Dept: FAMILY MEDICINE CLINIC | Age: 34
End: 2024-12-20

## 2024-12-20 ENCOUNTER — PATIENT MESSAGE (OUTPATIENT)
Dept: FAMILY MEDICINE CLINIC | Age: 34
End: 2024-12-20

## 2024-12-23 ENCOUNTER — TELEPHONE (OUTPATIENT)
Dept: FAMILY MEDICINE CLINIC | Age: 34
End: 2024-12-23

## 2024-12-23 NOTE — TELEPHONE ENCOUNTER
Pt's  is calling requesting an off work excuse for pt he states she is still not feeling well and would like a work excuse to cover today. Please call  when note is ready.    Please advise and route to clinical pool

## 2024-12-27 ENCOUNTER — OFFICE VISIT (OUTPATIENT)
Dept: FAMILY MEDICINE CLINIC | Age: 34
End: 2024-12-27
Payer: OTHER GOVERNMENT

## 2024-12-27 VITALS
DIASTOLIC BLOOD PRESSURE: 80 MMHG | WEIGHT: 151 LBS | OXYGEN SATURATION: 98 % | HEART RATE: 88 BPM | BODY MASS INDEX: 27.62 KG/M2 | SYSTOLIC BLOOD PRESSURE: 115 MMHG | TEMPERATURE: 97.1 F

## 2024-12-27 DIAGNOSIS — H65.03 NON-RECURRENT ACUTE SEROUS OTITIS MEDIA OF BOTH EARS: Primary | ICD-10-CM

## 2024-12-27 PROCEDURE — 99213 OFFICE O/P EST LOW 20 MIN: CPT | Performed by: FAMILY MEDICINE

## 2024-12-27 RX ORDER — GUAIFENESIN 600 MG/1
1200 TABLET, EXTENDED RELEASE ORAL 2 TIMES DAILY
Qty: 40 TABLET | Refills: 5 | Status: SHIPPED | OUTPATIENT
Start: 2024-12-27 | End: 2025-01-06

## 2024-12-27 ASSESSMENT — PATIENT HEALTH QUESTIONNAIRE - PHQ9
SUM OF ALL RESPONSES TO PHQ9 QUESTIONS 1 & 2: 0
SUM OF ALL RESPONSES TO PHQ QUESTIONS 1-9: 0
1. LITTLE INTEREST OR PLEASURE IN DOING THINGS: NOT AT ALL
2. FEELING DOWN, DEPRESSED OR HOPELESS: NOT AT ALL

## 2024-12-27 NOTE — PROGRESS NOTES
[facility]  [facility] MEDICINE  [address]  [address]  [facility] [address]  Dept: [phone]      Alem Craft is a 34 y.o. female who presents today for follow up on her  medical conditions as noted below.      Chief Complaint   Patient presents with    Ear Pain     Bleeding from left ear       Patient Active Problem List:     Anxiety and depression     PTSD (post-traumatic stress disorder)     History of low transverse  section     Chronic low back pain     Endometriosis     Fibromyalgia     Human papilloma virus (HPV) infection     Slow transit constipation     BMI 27.0-27.9,adult     Gastroesophageal reflux disease without esophagitis     Tear film insufficiency     Abnormal Pap smear of cervix     Insomnia related to another mental disorder     Attention-deficit hyperactivity disorder     Medial epicondylitis of elbow, right     Pain of right middle finger     Uses intrauterine device for birth control     Cubital tunnel syndrome, right     Carpal tunnel syndrome of right wrist     Past Medical History:   Diagnosis Date    Anxiety     Attention-deficit hyperactivity disorder 2021    Chronic low back pain     hx bulgin disc, used to see pain mgmt in [place]    COVID-19 2022    Headache, nausea, sinus congestion, achey, fever, chills  X1 week. repeat 3/2023    Depression     Endometriosis     Fibromyalgia 2020    Hx lyrica, no longer, also no longer taking Gabapentin, but still keeps on med list    Gastroesophageal reflux disease without esophagitis 2021    HPV (human papilloma virus) anogenital infection     Hyperhidrosis     PTSD (post-traumatic stress disorder)     Under care of team 2022    NEUROLOGY - DR. MOREJON - last visit 2022    Under care of team 2022    NEUROSURGERY - DR. ANDERSON - last visit 10/2022    Wears contact lenses 2022    Wears glasses 2022    Wellness examination 2022    PCP - DR. MALLORY - last

## 2025-01-03 ENCOUNTER — HOSPITAL ENCOUNTER (EMERGENCY)
Age: 35
Discharge: HOME | End: 2025-01-03
Attending: EMERGENCY MEDICINE
Payer: OTHER GOVERNMENT

## 2025-01-03 VITALS
DIASTOLIC BLOOD PRESSURE: 67 MMHG | HEART RATE: 73 BPM | TEMPERATURE: 98.2 F | RESPIRATION RATE: 18 BRPM | HEIGHT: 62 IN | BODY MASS INDEX: 28.16 KG/M2 | OXYGEN SATURATION: 100 % | WEIGHT: 153 LBS | SYSTOLIC BLOOD PRESSURE: 107 MMHG

## 2025-01-03 DIAGNOSIS — A08.4 VIRAL GASTROENTERITIS: Primary | ICD-10-CM

## 2025-01-03 PROCEDURE — 96375 TX/PRO/DX INJ NEW DRUG ADDON: CPT

## 2025-01-03 PROCEDURE — 96374 THER/PROPH/DIAG INJ IV PUSH: CPT

## 2025-01-03 PROCEDURE — 99284 EMERGENCY DEPT VISIT MOD MDM: CPT

## 2025-01-03 PROCEDURE — 6360000002 HC RX W HCPCS: Performed by: EMERGENCY MEDICINE

## 2025-01-03 PROCEDURE — 2580000003 HC RX 258: Performed by: EMERGENCY MEDICINE

## 2025-01-03 PROCEDURE — 6370000000 HC RX 637 (ALT 250 FOR IP): Performed by: EMERGENCY MEDICINE

## 2025-01-03 PROCEDURE — 2500000003 HC RX 250 WO HCPCS: Performed by: EMERGENCY MEDICINE

## 2025-01-03 RX ORDER — 0.9 % SODIUM CHLORIDE 0.9 %
1000 INTRAVENOUS SOLUTION INTRAVENOUS ONCE
Status: COMPLETED | OUTPATIENT
Start: 2025-01-03 | End: 2025-01-03

## 2025-01-03 RX ORDER — KETOROLAC TROMETHAMINE 15 MG/ML
15 INJECTION, SOLUTION INTRAMUSCULAR; INTRAVENOUS ONCE
Status: COMPLETED | OUTPATIENT
Start: 2025-01-03 | End: 2025-01-03

## 2025-01-03 RX ORDER — ONDANSETRON 2 MG/ML
4 INJECTION INTRAMUSCULAR; INTRAVENOUS ONCE
Status: COMPLETED | OUTPATIENT
Start: 2025-01-03 | End: 2025-01-03

## 2025-01-03 RX ORDER — MAGNESIUM HYDROXIDE/ALUMINUM HYDROXICE/SIMETHICONE 120; 1200; 1200 MG/30ML; MG/30ML; MG/30ML
30 SUSPENSION ORAL ONCE
Status: COMPLETED | OUTPATIENT
Start: 2025-01-03 | End: 2025-01-03

## 2025-01-03 RX ORDER — KETOROLAC TROMETHAMINE 30 MG/ML
30 INJECTION, SOLUTION INTRAMUSCULAR; INTRAVENOUS ONCE
Status: DISCONTINUED | OUTPATIENT
Start: 2025-01-03 | End: 2025-01-03

## 2025-01-03 RX ADMIN — SODIUM CHLORIDE 1000 ML: 9 INJECTION, SOLUTION INTRAVENOUS at 13:29

## 2025-01-03 RX ADMIN — FAMOTIDINE 20 MG: 10 INJECTION, SOLUTION INTRAVENOUS at 13:30

## 2025-01-03 RX ADMIN — KETOROLAC TROMETHAMINE 15 MG: 15 INJECTION, SOLUTION INTRAMUSCULAR; INTRAVENOUS at 13:44

## 2025-01-03 RX ADMIN — ONDANSETRON 4 MG: 2 INJECTION, SOLUTION INTRAMUSCULAR; INTRAVENOUS at 13:29

## 2025-01-03 RX ADMIN — ALUMINUM HYDROXIDE, MAGNESIUM HYDROXIDE, AND SIMETHICONE 30 ML: 1200; 120; 1200 SUSPENSION ORAL at 13:30

## 2025-01-03 ASSESSMENT — PAIN SCALES - GENERAL
PAINLEVEL_OUTOF10: 6
PAINLEVEL_OUTOF10: 6
PAINLEVEL_OUTOF10: 4

## 2025-01-03 ASSESSMENT — PAIN DESCRIPTION - DESCRIPTORS
DESCRIPTORS: ACHING
DESCRIPTORS: ACHING

## 2025-01-03 ASSESSMENT — PAIN - FUNCTIONAL ASSESSMENT: PAIN_FUNCTIONAL_ASSESSMENT: 0-10

## 2025-01-03 ASSESSMENT — LIFESTYLE VARIABLES
HOW OFTEN DO YOU HAVE A DRINK CONTAINING ALCOHOL: NEVER
HOW MANY STANDARD DRINKS CONTAINING ALCOHOL DO YOU HAVE ON A TYPICAL DAY: PATIENT DOES NOT DRINK

## 2025-01-03 ASSESSMENT — PAIN DESCRIPTION - LOCATION
LOCATION: GENERALIZED
LOCATION: ABDOMEN
LOCATION: GENERALIZED

## 2025-01-03 ASSESSMENT — PAIN DESCRIPTION - PAIN TYPE: TYPE: ACUTE PAIN

## 2025-01-03 ASSESSMENT — PAIN DESCRIPTION - FREQUENCY: FREQUENCY: CONTINUOUS

## 2025-01-03 NOTE — ED PROVIDER NOTES
EMERGENCY DEPARTMENT ENCOUNTER    Pt Name: Alem Craft  MRN: [id number]  Birthdate 1990  Date of evaluation: 1/3/25  CHIEF COMPLAINT       Chief Complaint   Patient presents with    Abdominal Pain     Pt reports abdominal pain, nausea, and emesis since this morning around 1 am.  Pt concerned for food poisoning- states that she had chipotle yesterday that she vomited up.  Pt states that she has had two episodes of emesis today.       HISTORY OF PRESENT ILLNESS   The history is provided by the patient and medical records.    The patient is a 34-year-old female with history of ADHD, GERD, fibromyalgia, anxiety, chronic low back pain, depression, endometriosis and PTSD who presents to the ED for nausea, vomiting and diarrhea.  Symptoms started approximately 1 AM this morning.  She believes it is related to eating Chipotle last night for dinner.  No sick contacts.  No fevers. No reports of hematemesis.  No reports of melena or bright red blood in stool.    REVIEW OF SYSTEMS     Review of Systems  All other systems reviewed and are negative.    PASTMEDICAL HISTORY     Past Medical History:   Diagnosis Date    Anxiety     Attention-deficit hyperactivity disorder 06/04/2021    Chronic low back pain     hx bulgin disc, used to see pain mgmt in [place]    COVID-19 01/2022    Headache, nausea, sinus congestion, achey, fever, chills  X1 week. repeat 3/2023    Depression     Endometriosis     Fibromyalgia 02/05/2020    Hx lyrica, no longer, also no longer taking Gabapentin, but still keeps on med list    Gastroesophageal reflux disease without esophagitis 04/25/2021    HPV (human papilloma virus) anogenital infection     Hyperhidrosis     PTSD (post-traumatic stress disorder)     Under care of team 11/18/2022    NEUROLOGY - DR. MOREJON - last visit 11/2022    Under care of team 11/18/2022    NEUROSURGERY - DR. ANDERSON - last visit 10/2022    Wears contact lenses 11/18/2022    Wears glasses 11/18/2022    Wellness examination  Normocephalic.   Eyes:      Conjunctiva/sclera: Conjunctivae normal.   Cardiovascular:      Rate and Rhythm: Normal rate.   Pulmonary:      Effort: Pulmonary effort is normal.   Abdominal:      General: Abdomen is soft, nontender.  Musculoskeletal:      General: No muscular tenderness.   Skin:     General: Skin is dry.   Neurological:      Mental Status: Patient is alert. Mental status is at baseline.     MEDICAL DECISION MAKING / ED COURSE:     1)  Number and Complexity of Problems  Problem List This Visit: Nausea, vomiting, diarrhea.    Differential Diagnosis: Viral gastroenteritis, constipation, diverticulosis.    Diagnoses Considered but Do Not Suspect: Diverticulitis, SBO, mesenteric ischemia, referred cardiac pain.    2)  Data Reviewed  Patient's EKG independently interpreted by me: N/A    Decision Rules/Scores utilized:  N/A    HEART SCORE: N/A, no chest pain.    NIH STROKE SCALE: N/A, no focal neurodeficits.     External Documents Reviewed: I have independently reviewed patient's previous medical records including labs, notes and imaging.    Tests considered but not ordered and why:  MRI not indicated at this time.    Imaging that is independently reviewed and interpreted by me as: N/A    See more data below for the lab and radiology tests and orders.    3)  Treatment and Disposition    Patient repeat assessment: The patient is hemodynamically stable.  Symptoms improved with fluids, Zofran, Pepcid, Toradol and GI cocktail.  High clinical index of suspicion for viral gastroenteritis.  Patient tolerating p.o.    All questions answered.  Given strict return precautions.  No further work-up indicated at this time.    Social determinants of health impacting treatment or disposition:  None    Shared Decision Making completed with patient regarding risks and benefits of admission versus discharge.  Patient decides to be discharged home.    Code Status Discussion:  Not discussed    MIPS:  N/A    \"ED Course\" Notes

## 2025-01-17 ENCOUNTER — OFFICE VISIT (OUTPATIENT)
Dept: OBGYN CLINIC | Age: 35
End: 2025-01-17

## 2025-01-17 VITALS
BODY MASS INDEX: 29.63 KG/M2 | HEIGHT: 62 IN | WEIGHT: 161 LBS | SYSTOLIC BLOOD PRESSURE: 126 MMHG | DIASTOLIC BLOOD PRESSURE: 82 MMHG

## 2025-01-17 DIAGNOSIS — N92.6 MISSED MENSES: Primary | ICD-10-CM

## 2025-01-17 PROBLEM — Z97.5 USES INTRAUTERINE DEVICE FOR BIRTH CONTROL: Status: RESOLVED | Noted: 2022-03-10 | Resolved: 2025-01-17

## 2025-01-17 PROBLEM — N87.0 CERVICAL INTRAEPITHELIAL NEOPLASIA GRADE 1: Status: ACTIVE | Noted: 2025-01-17

## 2025-01-17 PROBLEM — F32.5 MAJOR DEPRESSIVE DISORDER WITH SINGLE EPISODE, IN FULL REMISSION (HCC): Status: ACTIVE | Noted: 2025-01-17

## 2025-01-17 PROBLEM — F51.9 NONORGANIC SLEEP DISORDER: Status: ACTIVE | Noted: 2025-01-17

## 2025-01-17 LAB
CONTROL: ABNORMAL
PREGNANCY TEST URINE, POC: POSITIVE

## 2025-01-17 RX ORDER — PYRIDOXINE HCL (VITAMIN B6) 50 MG
50 TABLET ORAL 2 TIMES DAILY
Qty: 60 TABLET | Refills: 1 | Status: SHIPPED | OUTPATIENT
Start: 2025-01-17 | End: 2025-02-16

## 2025-01-17 RX ORDER — FAMOTIDINE 20 MG/1
20 TABLET, FILM COATED ORAL NIGHTLY
COMMUNITY
Start: 2025-01-06

## 2025-01-17 NOTE — PROGRESS NOTES
Alem Craft  2025    YOB: 1990          The patient was seen today. She is here regarding missed menses. Currently  but in process of divorce. Got pregnant by another partner. She has been with him for 6 months. Living with her  and sleeping on the couch. Patient upset about current pregnancy. Unsure if she wants to keep it. LMP . Second pregnancy. Has 16 year old son. Recently stopped Cymbalta. Complaining of nausea.  Her bowels are regular and she is voiding without difficulty.     HPI:  Alem Craft is a 34 y.o. female      Missed menses      OB History    Para Term  AB Living   1 1 1 0 0 1   SAB IAB Ectopic Molar Multiple Live Births   0 0 0 0 0 1      # Outcome Date GA Lbr Cullen/2nd Weight Sex Type Anes PTL Lv   1 Term  39w0d   M CS-LTranv  N HERNANDEZ       Past Medical History:   Diagnosis Date    Anxiety     Attention-deficit hyperactivity disorder 2021    Chronic low back pain     hx bulgin disc, used to see pain mgmt in [place]    COVID-19 2022    Headache, nausea, sinus congestion, achey, fever, chills  X1 week. repeat 3/2023    Depression     Endometriosis     Fibromyalgia 2020    Hx lyrica, no longer, also no longer taking Gabapentin, but still keeps on med list    Gastroesophageal reflux disease without esophagitis 2021    HPV (human papilloma virus) anogenital infection     Hyperhidrosis     PTSD (post-traumatic stress disorder)     Under care of team 2022    NEUROLOGY - DR. MOREJON - last visit 2022    Under care of team 2022    NEUROSURGERY - DR. ANDERSON - last visit 10/2022    Wears contact lenses 2022    Wears glasses 2022    Wellness examination 2022    PCP - DR. MALLORY - last viait - 2022       Past Surgical History:   Procedure Laterality Date    CARPAL TUNNEL RELEASE Right 2022    ULNAR NERVE TRANSPOSITION AND GUYON, CARPAL TUNNEL RELEASE    CARPAL TUNNEL RELEASE Right

## 2025-01-19 ENCOUNTER — HOSPITAL ENCOUNTER (EMERGENCY)
Age: 35
Discharge: HOME | End: 2025-01-19
Attending: EMERGENCY MEDICINE
Payer: OTHER GOVERNMENT

## 2025-01-19 ENCOUNTER — APPOINTMENT (OUTPATIENT)
Dept: ULTRASOUND IMAGING | Age: 35
End: 2025-01-19
Payer: OTHER GOVERNMENT

## 2025-01-19 VITALS
HEART RATE: 86 BPM | TEMPERATURE: 98.1 F | OXYGEN SATURATION: 98 % | SYSTOLIC BLOOD PRESSURE: 114 MMHG | RESPIRATION RATE: 16 BRPM | DIASTOLIC BLOOD PRESSURE: 79 MMHG

## 2025-01-19 DIAGNOSIS — O46.90 VAGINAL BLEEDING IN PREGNANCY: Primary | ICD-10-CM

## 2025-01-19 LAB
B-HCG SERPL EIA 3RD IS-ACNC: NORMAL MIU/ML
BACTERIA URNS QL MICRO: ABNORMAL
BASOPHILS # BLD: 0.05 K/UL (ref 0–0.2)
BASOPHILS NFR BLD: 1 % (ref 0–2)
BILIRUB UR QL STRIP: NEGATIVE
CASTS #/AREA URNS LPF: ABNORMAL /LPF (ref 0–8)
CLARITY UR: CLEAR
COLOR UR: YELLOW
EOSINOPHIL # BLD: 0.21 K/UL (ref 0–0.44)
EOSINOPHILS RELATIVE PERCENT: 2 % (ref 1–4)
EPI CELLS #/AREA URNS HPF: ABNORMAL /HPF (ref 0–5)
ERYTHROCYTE [DISTWIDTH] IN BLOOD BY AUTOMATED COUNT: 13 % (ref 11.8–14.4)
GLUCOSE UR STRIP-MCNC: NEGATIVE MG/DL
HCT VFR BLD AUTO: 37.9 % (ref 36.3–47.1)
HGB BLD-MCNC: 12.9 G/DL (ref 11.9–15.1)
HGB UR QL STRIP.AUTO: ABNORMAL
IMM GRANULOCYTES # BLD AUTO: 0.07 K/UL (ref 0–0.3)
IMM GRANULOCYTES NFR BLD: 1 %
KETONES UR STRIP-MCNC: NEGATIVE MG/DL
LEUKOCYTE ESTERASE UR QL STRIP: NEGATIVE
LYMPHOCYTES NFR BLD: 2.59 K/UL (ref 1.1–3.7)
LYMPHOCYTES RELATIVE PERCENT: 26 % (ref 24–43)
MCH RBC QN AUTO: 31.7 PG (ref 25.2–33.5)
MCHC RBC AUTO-ENTMCNC: 34 G/DL (ref 28.4–34.8)
MCV RBC AUTO: 93.1 FL (ref 82.6–102.9)
MONOCYTES NFR BLD: 1.37 K/UL (ref 0.1–1.2)
MONOCYTES NFR BLD: 14 % (ref 3–12)
NEUTROPHILS NFR BLD: 56 % (ref 36–65)
NEUTS SEG NFR BLD: 5.62 K/UL (ref 1.5–8.1)
NITRITE UR QL STRIP: NEGATIVE
NRBC BLD-RTO: 0 PER 100 WBC
PH UR STRIP: 7 [PH] (ref 5–8)
PLATELET # BLD AUTO: 249 K/UL (ref 138–453)
PMV BLD AUTO: 10.5 FL (ref 8.1–13.5)
PROT UR STRIP-MCNC: NEGATIVE MG/DL
RBC # BLD AUTO: 4.07 M/UL (ref 3.95–5.11)
RBC #/AREA URNS HPF: ABNORMAL /HPF (ref 0–4)
SP GR UR STRIP: 1 (ref 1–1.03)
UROBILINOGEN UR STRIP-ACNC: NORMAL EU/DL (ref 0–1)
WBC #/AREA URNS HPF: ABNORMAL /HPF (ref 0–5)
WBC OTHER # BLD: 9.9 K/UL (ref 3.5–11.3)

## 2025-01-19 PROCEDURE — 99284 EMERGENCY DEPT VISIT MOD MDM: CPT

## 2025-01-19 PROCEDURE — 81001 URINALYSIS AUTO W/SCOPE: CPT

## 2025-01-19 PROCEDURE — 84702 CHORIONIC GONADOTROPIN TEST: CPT

## 2025-01-19 PROCEDURE — 85025 COMPLETE CBC W/AUTO DIFF WBC: CPT

## 2025-01-19 PROCEDURE — 87086 URINE CULTURE/COLONY COUNT: CPT

## 2025-01-19 PROCEDURE — 6370000000 HC RX 637 (ALT 250 FOR IP)

## 2025-01-19 PROCEDURE — 96374 THER/PROPH/DIAG INJ IV PUSH: CPT

## 2025-01-19 PROCEDURE — 6360000002 HC RX W HCPCS

## 2025-01-19 PROCEDURE — 76817 TRANSVAGINAL US OBSTETRIC: CPT

## 2025-01-19 RX ORDER — ACETAMINOPHEN 500 MG
1000 TABLET ORAL
Status: COMPLETED | OUTPATIENT
Start: 2025-01-19 | End: 2025-01-19

## 2025-01-19 RX ORDER — ONDANSETRON 2 MG/ML
4 INJECTION INTRAMUSCULAR; INTRAVENOUS ONCE
Status: COMPLETED | OUTPATIENT
Start: 2025-01-19 | End: 2025-01-19

## 2025-01-19 RX ORDER — CEPHALEXIN 500 MG/1
500 CAPSULE ORAL 2 TIMES DAILY
Qty: 14 CAPSULE | Refills: 0 | Status: SHIPPED | OUTPATIENT
Start: 2025-01-19 | End: 2025-01-26

## 2025-01-19 RX ORDER — ONDANSETRON 4 MG/1
4 TABLET, FILM COATED ORAL 3 TIMES DAILY PRN
Qty: 6 TABLET | Refills: 0 | Status: SHIPPED | OUTPATIENT
Start: 2025-01-19

## 2025-01-19 RX ORDER — CEPHALEXIN 500 MG/1
500 CAPSULE ORAL ONCE
Status: COMPLETED | OUTPATIENT
Start: 2025-01-19 | End: 2025-01-19

## 2025-01-19 RX ADMIN — ONDANSETRON 4 MG: 2 INJECTION INTRAMUSCULAR; INTRAVENOUS at 02:01

## 2025-01-19 RX ADMIN — CEPHALEXIN 500 MG: 500 CAPSULE ORAL at 04:13

## 2025-01-19 RX ADMIN — ACETAMINOPHEN 1000 MG: 500 TABLET ORAL at 03:31

## 2025-01-19 ASSESSMENT — PAIN DESCRIPTION - LOCATION: LOCATION: ABDOMEN

## 2025-01-19 ASSESSMENT — PAIN SCALES - GENERAL: PAINLEVEL_OUTOF10: 8

## 2025-01-19 ASSESSMENT — PAIN - FUNCTIONAL ASSESSMENT: PAIN_FUNCTIONAL_ASSESSMENT: 0-10

## 2025-01-19 NOTE — DISCHARGE INSTRUCTIONS
Follow-up with your OB in 7 to 10 days for repeat ultrasound.    You can start taking Vitamin B6 or using Ramila (250 mg 4 times daily) to help with the pregnancy related nausea.  Diphenhydramine (Benadryl) may also be beneficial, you can take 25 - 50 mg (1 - 2 tablets) every 6 hours.      Potential miscarriage has not been ruled out in your case of pregnancy.   Use ibuprofen or Tylenol (unless prescribed medications that have Tylenol in it) for pain.  You can take over the counter Ibuprofen (advil) tablets (4 tablets every 8 hours or 3 tablets every 6 hours or 2 tablets every 4 hours)    Return to the Emergency Department for worsening of vaginal bleeding, using more than 4 pads per hour, feeling of weakness, dizzy, nausea / vomiting, any other care or concern.

## 2025-01-19 NOTE — CONSULTS
Obstetric/Gynecology Resident Telephone Consult Note    OB/GYN consulted regarding vaginal bleeding. Patient is approximately 6 weeks gestation and had her first OB appointment on 1/17/25 for missed menses. She was confirmed to be pregnant at the time. She started noticing increased vaginal spotting yesterday. Her vitals are stable. See ED resident note for abdominal and pelvic exam. Her Hgb is 12.9 and bHCG noted to be 63,013. Ultrasound in the ED showed intrauterine gestational sac, yolk sac, CRL 2.1 mm with estimated gestational age of 5w5d, and no cardiac activity detected. Patient's clinical findings and results are consistent with early pregnancy vs miscarriage. She is hemodynamically stable and not in acute distress, therefore she does not require inpatient management at this time. Advised ED resident to have patient follow-up with her OB/GYN outpatient for repeat ultrasound in one week. Message sent to patient's OB/GYN provider office for follow-up appointment.    Vitals:    01/19/25 0028   BP: 114/79   Pulse: 86   Resp: 18   Temp: 98.1 °F (36.7 °C)   SpO2: 98%     Senior resident and attending updated.    Alem Mohan DO  OB/GYN Resident, PGY 1  [place], [place]  1/19/2025, 3:54 AM

## 2025-01-19 NOTE — ED PROVIDER NOTES
[facility] EMERGENCY DEPARTMENT     Emergency Department     Faculty Attestation    I performed a history and physical examination of the patient and discussed management with the resident. I reviewed the resident’s note and agree with the documented findings and plan of care. Any areas of disagreement are noted on the chart. I was personally present for the key portions of any procedures. I have documented in the chart those procedures where I was not present during the key portions. I have reviewed the emergency nurses triage note. I agree with the chief complaint, past medical history, past surgical history, allergies, medications, social and family history as documented unless otherwise noted below. For Physician Assistant/ Nurse Practitioner cases/documentation I have personally evaluated this patient and have completed at least one if not all key elements of the E/M (history, physical exam, and MDM). Additional findings are as noted.    Note Started: 1:07 AM EST    Patient here with first trimester bleeding, 6 weeks and 2 days by dates.  OB appointment yesterday positive pregnancy test but has not yet had an ultrasound.  Now having spotting cramping scant discharge.  No urinary complaints no fevers no vomiting.  On exam well-appearing nontoxic chatting with her significant other.  Abdomen soft nontender.  Will order quant ultrasound pelvic exam, reevaluate    Blood type is A positive on old record review    Critical Care     none    Rudolph Shannon MD, FACEP, FAAEM  Attending Emergency  Physician           Rudolph Shannon MD  01/19/25 0133    
 Handoff taken on the following patient from prior Attending Physician:    Pt Name: Alem Craft    PCP:  Camila Fried MD         Attestation    I was available and discussed any additional care issues that arose and coordinated the management plans with the resident(s) caring for the patient during my duty period. Any areas of disagreement with resident’s documentation of care or procedures are noted on the chart. I was personally present for the key portions of any/all procedures during my duty period. I have documented in the chart those procedures where I was not present during the key portions.    Patient is a 34-year-old female first trimester bleeding needing ultrasound to confirm IUP and quantitative for OB discussion     Rajeev Christina DO  01/19/25 0205    
  Eosinophils % 2   Basophils % 1   Immature Granulocytes % 1(!)   Neutrophils Absolute 5.62   Lymphocytes Absolute 2.59   Monocytes Absolute 1.37(!)   Eosinophils Absolute 0.21   Basophils Absolute 0.05   Immature Granulocytes Absolute 0.07 [TF]   0134 Urinalysis with Microscopic(!):    Color, UA Yellow   Turbidity UA Clear   Glucose, Ur NEGATIVE   Bilirubin, Urine NEGATIVE   Ketones, Urine NEGATIVE   Specific [place], UA 1.005   Urine Hgb TRACE(!)   pH, Urine 7.0   Protein, UA NEGATIVE   Urobilinogen Normal   Nitrite, Urine NEGATIVE   Leukocyte Esterase, Urine NEGATIVE   WBC, UA None   RBC, UA 0 TO 2   Casts UA None Reference range defined for non-centrifuged specimen.   Epithelial Cells, UA 0 TO 2   Bacteria, UA FEW(!) [TF]   0319 US OB TRANSVAGINAL  IMPRESSION:  1. There is a single intrauterine gestational sac, with discrepancy between  the gestational sac size and embryo size.  Estimated gestational age of 5  weeks 5 days based on crown-rump length, though 8 weeks 3 days based on  gestational sac.  Recommend close sonographic and beta hCG follow-up.  2. Cardiac activity not demonstrated at this time, which may be related to  early imaging though early pregnancy loss cannot be excluded.  3. No suspicious ovarian abnormality.   [TF]   0330 Serum hCG 63, 013.  Representative of appropriate hCG given she is roughly 6 weeks along.  No priors for comparison.  CBC without signs of anemia, hemoglobin 12.9.  Urinalysis does show few bacteria.  Will plan to treat for asymptomatic bacteriuria in pregnancy.  Will provide dose of Keflex here.    Discussed patient with OB team including results of laboratory and ultrasound.  Given patient's ultrasound is representative of intrauterine pregnancy however differentiate between normal intrauterine pregnancy versus miscarriage.  Advised patient get a repeat ultrasound in 7 to 10 days.    Discussed results of all imaging and laboratory with patient.  Including results of

## 2025-01-19 NOTE — ED NOTES
Pt. Presents to the ED from home for vaginal bleeding x1 week. Pt states that she just found out she is about 6 weeks pregnant. Pt states that her bleeding started as spotting, but is not a little heavier. Pt also complaining of a headache and nausea. Pt resp even and non labored. Pt vss. Resident at the bedside for evaluation. Will continue with plan of care.

## 2025-01-20 ENCOUNTER — TELEPHONE (OUTPATIENT)
Dept: OBGYN CLINIC | Age: 35
End: 2025-01-20

## 2025-01-20 LAB
MICROORGANISM SPEC CULT: NORMAL
SERVICE CMNT-IMP: NORMAL
SPECIMEN DESCRIPTION: NORMAL

## 2025-01-20 NOTE — TELEPHONE ENCOUNTER
----- Message from ALEX Torres CNP sent at 1/20/2025  8:10 AM EST -----  Regarding: FW: ED Follow-Up  Repeat quant HCG with CBC, type and screen in 48 hours. Call office or go to ER with increase in vaginal bleeding, pelvic pain or fever.  ----- Message -----  From: Alem Mohan DO  Sent: 1/19/2025   4:24 AM EST  To: ALEX Meyer CNP  Subject: ED Follow-Up                                     Hello!    This paint was seen in the ED today for vaginal spotting. Her quant was 63,013. Ultrasound showed confirmed IUP with no cardiac activity, likely early pregnancy vs miscarriage. She saw you on 1/17/25. Could she get an ED follow-up scheduled with you?    Thank you!  Alem

## 2025-01-21 ENCOUNTER — TELEPHONE (OUTPATIENT)
Dept: OBGYN CLINIC | Age: 35
End: 2025-01-21

## 2025-01-21 DIAGNOSIS — O20.9 VAGINAL BLEEDING AFFECTING EARLY PREGNANCY: Primary | ICD-10-CM

## 2025-01-21 NOTE — TELEPHONE ENCOUNTER
Pt calling in with questions re: pelvic pain.  Pt reports it is more on left side which US indicated an ovarian cyst.  Pt still recommended to go to ER at [facility] for evaluation.  Pt verbalized understanding.

## 2025-01-21 NOTE — TELEPHONE ENCOUNTER
----- Message from ALEX Torres CNP sent at 1/20/2025  8:10 AM EST -----  Regarding: FW: ED Follow-Up  Repeat quant HCG with CBC, type and screen in 48 hours. Call office or go to ER with increase in vaginal bleeding, pelvic pain or fever.  ----- Message -----  From: Alem Mohan DO  Sent: 1/19/2025   4:24 AM EST  To: ALEX Meyer CNP  Subject: ED Follow-Up                                      Hello!     This paint was seen in the ED today for vaginal spotting. Her quant was 63,013. Ultrasound showed confirmed IUP with no cardiac activity, likely early pregnancy vs miscarriage. She saw you on 1/17/25. Could she get an ED follow-up scheduled with you?     Thank you!  Alem              Per Jasmina NP, contacted pt re: ER f/u.  Pt reports small blood clots and moderate vaginal bleeding; not saturating pads.  Pt reports increased pelvic pain since ER visit.  Pt instructed to go to ER at [facility] for evaluation.  Labs ordered.  Pt verbalized understanding.

## 2025-01-22 ENCOUNTER — TELEPHONE (OUTPATIENT)
Dept: OBGYN CLINIC | Age: 35
End: 2025-01-22

## 2025-01-22 ENCOUNTER — HOSPITAL ENCOUNTER (EMERGENCY)
Age: 35
Discharge: HOME | End: 2025-01-22
Attending: EMERGENCY MEDICINE
Payer: OTHER GOVERNMENT

## 2025-01-22 ENCOUNTER — APPOINTMENT (OUTPATIENT)
Dept: ULTRASOUND IMAGING | Age: 35
End: 2025-01-22
Payer: OTHER GOVERNMENT

## 2025-01-22 VITALS
HEART RATE: 94 BPM | OXYGEN SATURATION: 98 % | BODY MASS INDEX: 29.63 KG/M2 | HEIGHT: 62 IN | DIASTOLIC BLOOD PRESSURE: 81 MMHG | RESPIRATION RATE: 15 BRPM | WEIGHT: 161 LBS | SYSTOLIC BLOOD PRESSURE: 112 MMHG | TEMPERATURE: 98.4 F

## 2025-01-22 DIAGNOSIS — Z3A.01 LESS THAN 8 WEEKS GESTATION OF PREGNANCY: ICD-10-CM

## 2025-01-22 DIAGNOSIS — O36.80X0 PREGNANCY WITH INCONCLUSIVE FETAL VIABILITY, SINGLE OR UNSPECIFIED FETUS: Primary | ICD-10-CM

## 2025-01-22 DIAGNOSIS — R10.9 ABDOMINAL PAIN, UNSPECIFIED ABDOMINAL LOCATION: ICD-10-CM

## 2025-01-22 LAB
ABO + RH BLD: NORMAL
ANION GAP SERPL CALCULATED.3IONS-SCNC: 10 MMOL/L (ref 9–16)
ARM BAND NUMBER: NORMAL
B-HCG SERPL EIA 3RD IS-ACNC: NORMAL MIU/ML
BACTERIA URNS QL MICRO: ABNORMAL
BASOPHILS # BLD: 0.04 K/UL (ref 0–0.2)
BASOPHILS NFR BLD: 1 % (ref 0–2)
BILIRUB UR QL STRIP: NEGATIVE
BLOOD BANK SAMPLE EXPIRATION: NORMAL
BLOOD GROUP ANTIBODIES SERPL: NEGATIVE
BUN SERPL-MCNC: 11 MG/DL (ref 6–20)
CALCIUM SERPL-MCNC: 9.3 MG/DL (ref 8.6–10.4)
CANDIDA SPECIES: NEGATIVE
CASTS #/AREA URNS LPF: ABNORMAL /LPF (ref 0–2)
CASTS #/AREA URNS LPF: ABNORMAL /LPF (ref 0–2)
CHLORIDE SERPL-SCNC: 101 MMOL/L (ref 98–107)
CLARITY UR: ABNORMAL
CO2 SERPL-SCNC: 26 MMOL/L (ref 20–31)
COLOR UR: YELLOW
CREAT SERPL-MCNC: 0.8 MG/DL (ref 0.6–0.9)
EOSINOPHIL # BLD: 0.09 K/UL (ref 0–0.44)
EOSINOPHILS RELATIVE PERCENT: 1 % (ref 1–4)
EPI CELLS #/AREA URNS HPF: ABNORMAL /HPF (ref 0–5)
ERYTHROCYTE [DISTWIDTH] IN BLOOD BY AUTOMATED COUNT: 12.6 % (ref 11.8–14.4)
GARDNERELLA VAGINALIS: NEGATIVE
GFR, ESTIMATED: >90 ML/MIN/1.73M2
GLUCOSE SERPL-MCNC: 74 MG/DL (ref 74–99)
GLUCOSE UR STRIP-MCNC: NEGATIVE MG/DL
HCT VFR BLD AUTO: 40.4 % (ref 36.3–47.1)
HGB BLD-MCNC: 13.7 G/DL (ref 11.9–15.1)
HGB UR QL STRIP.AUTO: NEGATIVE
IMM GRANULOCYTES # BLD AUTO: 0.04 K/UL (ref 0–0.3)
IMM GRANULOCYTES NFR BLD: 1 %
KETONES UR STRIP-MCNC: NEGATIVE MG/DL
LEUKOCYTE ESTERASE UR QL STRIP: NEGATIVE
LYMPHOCYTES NFR BLD: 1.41 K/UL (ref 1.1–3.7)
LYMPHOCYTES RELATIVE PERCENT: 18 % (ref 24–43)
MCH RBC QN AUTO: 31.5 PG (ref 25.2–33.5)
MCHC RBC AUTO-ENTMCNC: 33.9 G/DL (ref 28.4–34.8)
MCV RBC AUTO: 92.9 FL (ref 82.6–102.9)
MONOCYTES NFR BLD: 1.11 K/UL (ref 0.1–1.2)
MONOCYTES NFR BLD: 15 % (ref 3–12)
MUCOUS THREADS URNS QL MICRO: ABNORMAL
NEUTROPHILS NFR BLD: 64 % (ref 36–65)
NEUTS SEG NFR BLD: 4.98 K/UL (ref 1.5–8.1)
NITRITE UR QL STRIP: NEGATIVE
NRBC BLD-RTO: 0 PER 100 WBC
PH UR STRIP: 7 [PH] (ref 5–8)
PLATELET # BLD AUTO: 250 K/UL (ref 138–453)
PMV BLD AUTO: 10.7 FL (ref 8.1–13.5)
POTASSIUM SERPL-SCNC: 3.8 MMOL/L (ref 3.7–5.3)
PROT UR STRIP-MCNC: NEGATIVE MG/DL
RBC # BLD AUTO: 4.35 M/UL (ref 3.95–5.11)
RBC #/AREA URNS HPF: ABNORMAL /HPF (ref 0–2)
SODIUM SERPL-SCNC: 137 MMOL/L (ref 136–145)
SOURCE: NORMAL
SP GR UR STRIP: 1.02 (ref 1–1.03)
TRICHOMONAS: NEGATIVE
UROBILINOGEN UR STRIP-ACNC: NORMAL EU/DL (ref 0–1)
WBC #/AREA URNS HPF: ABNORMAL /HPF (ref 0–5)
WBC OTHER # BLD: 7.7 K/UL (ref 3.5–11.3)

## 2025-01-22 PROCEDURE — 85025 COMPLETE CBC W/AUTO DIFF WBC: CPT

## 2025-01-22 PROCEDURE — 87510 GARDNER VAG DNA DIR PROBE: CPT

## 2025-01-22 PROCEDURE — 80048 BASIC METABOLIC PNL TOTAL CA: CPT

## 2025-01-22 PROCEDURE — 87480 CANDIDA DNA DIR PROBE: CPT

## 2025-01-22 PROCEDURE — 76817 TRANSVAGINAL US OBSTETRIC: CPT

## 2025-01-22 PROCEDURE — 81001 URINALYSIS AUTO W/SCOPE: CPT

## 2025-01-22 PROCEDURE — 76801 OB US < 14 WKS SINGLE FETUS: CPT

## 2025-01-22 PROCEDURE — 86900 BLOOD TYPING SEROLOGIC ABO: CPT

## 2025-01-22 PROCEDURE — 99284 EMERGENCY DEPT VISIT MOD MDM: CPT

## 2025-01-22 PROCEDURE — 86850 RBC ANTIBODY SCREEN: CPT

## 2025-01-22 PROCEDURE — 87591 N.GONORRHOEAE DNA AMP PROB: CPT

## 2025-01-22 PROCEDURE — 2580000003 HC RX 258: Performed by: EMERGENCY MEDICINE

## 2025-01-22 PROCEDURE — 87086 URINE CULTURE/COLONY COUNT: CPT

## 2025-01-22 PROCEDURE — 87660 TRICHOMONAS VAGIN DIR PROBE: CPT

## 2025-01-22 PROCEDURE — 87491 CHLMYD TRACH DNA AMP PROBE: CPT

## 2025-01-22 PROCEDURE — 86901 BLOOD TYPING SEROLOGIC RH(D): CPT

## 2025-01-22 PROCEDURE — 84702 CHORIONIC GONADOTROPIN TEST: CPT

## 2025-01-22 PROCEDURE — 6370000000 HC RX 637 (ALT 250 FOR IP): Performed by: EMERGENCY MEDICINE

## 2025-01-22 RX ORDER — OXYCODONE AND ACETAMINOPHEN 5; 325 MG/1; MG/1
1 TABLET ORAL EVERY 8 HOURS PRN
Qty: 10 TABLET | Refills: 0 | Status: SHIPPED | OUTPATIENT
Start: 2025-01-22 | End: 2025-01-29

## 2025-01-22 RX ORDER — 0.9 % SODIUM CHLORIDE 0.9 %
500 INTRAVENOUS SOLUTION INTRAVENOUS ONCE
Status: COMPLETED | OUTPATIENT
Start: 2025-01-22 | End: 2025-01-22

## 2025-01-22 RX ORDER — OXYCODONE AND ACETAMINOPHEN 5; 325 MG/1; MG/1
1 TABLET ORAL ONCE
Status: COMPLETED | OUTPATIENT
Start: 2025-01-22 | End: 2025-01-22

## 2025-01-22 RX ORDER — ACETAMINOPHEN 500 MG
1000 TABLET ORAL ONCE
Status: COMPLETED | OUTPATIENT
Start: 2025-01-22 | End: 2025-01-22

## 2025-01-22 RX ADMIN — OXYCODONE HYDROCHLORIDE AND ACETAMINOPHEN 1 TABLET: 5; 325 TABLET ORAL at 15:05

## 2025-01-22 RX ADMIN — SODIUM CHLORIDE 500 ML: 9 INJECTION, SOLUTION INTRAVENOUS at 11:22

## 2025-01-22 RX ADMIN — ACETAMINOPHEN 1000 MG: 500 TABLET, FILM COATED ORAL at 11:22

## 2025-01-22 ASSESSMENT — PAIN DESCRIPTION - LOCATION
LOCATION: ABDOMEN

## 2025-01-22 ASSESSMENT — ENCOUNTER SYMPTOMS
NAUSEA: 0
SHORTNESS OF BREATH: 0
DIARRHEA: 0
ABDOMINAL PAIN: 1
VOMITING: 0

## 2025-01-22 ASSESSMENT — PAIN SCALES - GENERAL
PAINLEVEL_OUTOF10: 6
PAINLEVEL_OUTOF10: 7
PAINLEVEL_OUTOF10: 6

## 2025-01-22 ASSESSMENT — PAIN - FUNCTIONAL ASSESSMENT
PAIN_FUNCTIONAL_ASSESSMENT: ACTIVITIES ARE NOT PREVENTED
PAIN_FUNCTIONAL_ASSESSMENT: ACTIVITIES ARE NOT PREVENTED
PAIN_FUNCTIONAL_ASSESSMENT: 0-10
PAIN_FUNCTIONAL_ASSESSMENT: ACTIVITIES ARE NOT PREVENTED

## 2025-01-22 ASSESSMENT — PAIN DESCRIPTION - DESCRIPTORS
DESCRIPTORS: CRAMPING
DESCRIPTORS: SHARP;PRESSURE

## 2025-01-22 ASSESSMENT — PAIN DESCRIPTION - PAIN TYPE: TYPE: ACUTE PAIN

## 2025-01-22 ASSESSMENT — PAIN DESCRIPTION - ORIENTATION: ORIENTATION: MID

## 2025-01-22 NOTE — ED TRIAGE NOTES
Patient is about 6 weeks pregnant with severe abdominal pain, that feels like menstrual cramps   Patient also reports , spotting for the past few days and vaginal discharge this morning \" she feels that she may be miscarrying   Also just recently had an ultrasound and other test to confirm pregnancy

## 2025-01-22 NOTE — CONSULTS
RBC, UA 2 TO 5 0 - 2 /HPF    Casts UA 5 TO 10 0 - 2 /LPF    Casts UA HYALINE 0 - 2 /LPF    Epithelial Cells, UA 10 TO 20 0 - 5 /HPF    Bacteria, UA MODERATE (A) None    Mucus, UA 1+    TYPE AND SCREEN   Result Value Ref Range    Blood Bank Sample Expiration 01/25/2025,2359     Arm Band Number [id number]     ABO/Rh A POSITIVE     Antibody Screen NEGATIVE        DIAGNOSTICS:    US OB TRANSVAGINAL    Result Date: 1/22/2025  EXAMINATION: FIRST TRIMESTER OBSTETRIC ULTRASOUND 1/22/2025 TECHNIQUE: Transabdominal and transvaginal first trimester obstetric pelvic duplex ultrasound was performed with real-time imaging, color flow Doppler imaging, and spectral analysis. COMPARISON: 19 January 2025 HISTORY: ORDERING SYSTEM PROVIDED HISTORY: eval for fetal viability - patient scanned on 1.19.24 that showed IUP but no cardiac activity TECHNOLOGIST PROVIDED HISTORY: Eval for fetal viability - patient scanned on 1.19.24 that showed IUP but no cardiac activity 63, 013 on 19 January 2025 FINDINGS: Uterus: Anteverted and anteflexed measuring Gestational Sac(s):  Single normal appearing gestational sac. Gestational sac 3.4 x 2.9 x 3.2 cm. No evidence of subchorionic hemorrhage. Yolk Sac: Present Embryo(<11wk) /Fetus(>=11wk): Single Crown Rump Length:  1.66 mm Rate of Cardiac Activity no cardiac activity detected. Right ovary: 2.9 x 1.1 x 2.3 cm.  No mass lesions. Left ovary: 2.78 x 1.42 x 2.21 cm containing a small vague hypoechoic structure of 1.6 x 1.9 x 1.5 cm. Free fluid: None Measurements: Estimated gestational age by current ultrasound: 6 weeks Estimated gestational age by LMP/prior ultrasound: Approximately 5 weeks by crown-rump measurement.  8 weeks 1 day by gestational sac size. Estimated Due Date: 2 September 2025 by ultrasound     There has been little change from prior study at which time there was a small, oval fetal pole, similar appearance on current study.  No fetal cardiac activity is demonstrated to ascertain viability.

## 2025-01-22 NOTE — ED PROVIDER NOTES
[facility] EMERGENCY DEPARTMENT  EMERGENCY DEPARTMENT ENCOUNTER      Pt Name: Alem Craft  MRN:[id number]  Birthdate 1990  Date of evaluation: 1/22/25      CHIEF COMPLAINT:   Chief Complaint   Patient presents with    Vaginal Discharge    Abdominal Pain     HISTORY OF PRESENT ILLNESS    Alem Craft is a 34 y.o. female who presents with early pregnancy with IUP without heartbeat 3 days ago at approximately 5 weeks gestation.   Beta-hCG was 63,000.   Has continued to have vaginal bleeding as well as some whitish to darker vaginal discharge.   Intermittent lower abdominal pain as well but no chest pain or shortness of breath.   Occasionally lightheaded upon standing.    Denies any vomiting or diarrhea associated.   Symptoms are moderate and here for repeat evaluation  Nothing seems to help them or hinder the symptoms.          REVIEW OF SYSTEMS(2-9 systemsfor level 4, 10 or more for level 5)     Review of Systems   Constitutional:  Negative for chills and fever.   Respiratory:  Negative for shortness of breath.    Cardiovascular:  Negative for chest pain.   Gastrointestinal:  Positive for abdominal pain. Negative for diarrhea, nausea and vomiting.   Genitourinary:  Positive for vaginal bleeding and vaginal discharge. Negative for dysuria.   Musculoskeletal:  Negative for neck pain.   Skin:  Negative for rash.   Neurological:  Negative for weakness (Denies any focal weakness).   All other systems reviewed and are negative.      PASTMEDICALHISTORY   PMH:  has a past medical history of Anxiety, Attention-deficit hyperactivity disorder, Chronic low back pain, COVID-19, Depression, Endometriosis, Fibromyalgia, Gastroesophageal reflux disease without esophagitis, HPV (human papilloma virus) anogenital infection, Hyperhidrosis, PTSD (post-traumatic stress disorder), Under care of team, Under care of team, Wears contact lenses, Wears glasses, and Wellness examination.  Surgical History:  has a past surgical

## 2025-01-22 NOTE — ED NOTES
The following labs were labeled with appropriate pt sticker and tubed to lab:     [] Blue     [x] Lavender   [] on ice  [x] Green/yellow  [] Green/black [] on ice  [] Grey  [] on ice  [] Yellow  [] Red  [x] Pink  [x] Type/ Screen  [] ABG  [] VBG    [] COVID-19 swab    [] Rapid  [] PCR  [] Flu swab  [] Peds Viral Panel     [x] Urine Sample  [] Fecal Sample  [] Pelvic Cultures  [] Blood Cultures  [] X 2  [] STREP Cultures  [] Wound Cultures

## 2025-01-22 NOTE — TELEPHONE ENCOUNTER
Patient seen in hospital today and had repeat u/s. On 1/19/2025 seen in ER and u/s did not show FHTs- recommended follow up with u/s and quant. Went today and not much change in study. Patient made aware by Hannah at  to repeat u/s and lab in 7 days. orders placed

## 2025-01-23 LAB
C TRACH DNA SPEC QL PROBE+SIG AMP: NEGATIVE
MICROORGANISM SPEC CULT: NO GROWTH
N GONORRHOEA DNA SPEC QL PROBE+SIG AMP: NEGATIVE
SERVICE CMNT-IMP: NORMAL
SPECIMEN DESCRIPTION: NORMAL
SPECIMEN DESCRIPTION: NORMAL

## 2025-01-31 ENCOUNTER — HOSPITAL ENCOUNTER (OUTPATIENT)
Age: 35
Setting detail: SPECIMEN
Discharge: HOME | End: 2025-01-31

## 2025-01-31 DIAGNOSIS — O20.9 VAGINAL BLEEDING AFFECTING EARLY PREGNANCY: ICD-10-CM

## 2025-01-31 LAB
ABO + RH BLD: NORMAL
B-HCG SERPL EIA 3RD IS-ACNC: NORMAL MIU/ML
BASOPHILS # BLD: 0.04 K/UL (ref 0–0.2)
BASOPHILS NFR BLD: 1 % (ref 0–2)
BLOOD GROUP ANTIBODIES SERPL: NEGATIVE
EOSINOPHIL # BLD: 0.14 K/UL (ref 0–0.44)
EOSINOPHILS RELATIVE PERCENT: 2 % (ref 1–4)
ERYTHROCYTE [DISTWIDTH] IN BLOOD BY AUTOMATED COUNT: 12.9 % (ref 11.8–14.4)
HCT VFR BLD AUTO: 42.4 % (ref 36.3–47.1)
HGB BLD-MCNC: 13.4 G/DL (ref 11.9–15.1)
IMM GRANULOCYTES # BLD AUTO: 0.03 K/UL (ref 0–0.3)
IMM GRANULOCYTES NFR BLD: 0 %
LYMPHOCYTES NFR BLD: 1.77 K/UL (ref 1.1–3.7)
LYMPHOCYTES RELATIVE PERCENT: 24 % (ref 24–43)
MCH RBC QN AUTO: 31.5 PG (ref 25.2–33.5)
MCHC RBC AUTO-ENTMCNC: 31.6 G/DL (ref 28.4–34.8)
MCV RBC AUTO: 99.5 FL (ref 82.6–102.9)
MONOCYTES NFR BLD: 1.03 K/UL (ref 0.1–1.2)
MONOCYTES NFR BLD: 14 % (ref 3–12)
NEUTROPHILS NFR BLD: 59 % (ref 36–65)
NEUTS SEG NFR BLD: 4.41 K/UL (ref 1.5–8.1)
NRBC BLD-RTO: 0 PER 100 WBC
PLATELET # BLD AUTO: 260 K/UL (ref 138–453)
PMV BLD AUTO: 11.1 FL (ref 8.1–13.5)
RBC # BLD AUTO: 4.26 M/UL (ref 3.95–5.11)
WBC OTHER # BLD: 7.4 K/UL (ref 3.5–11.3)

## 2025-02-01 ENCOUNTER — HOSPITAL ENCOUNTER (OUTPATIENT)
Dept: ULTRASOUND IMAGING | Age: 35
Discharge: HOME | End: 2025-02-03
Payer: OTHER GOVERNMENT

## 2025-02-01 DIAGNOSIS — O36.80X0 PREGNANCY WITH INCONCLUSIVE FETAL VIABILITY, SINGLE OR UNSPECIFIED FETUS: ICD-10-CM

## 2025-02-01 PROCEDURE — 76817 TRANSVAGINAL US OBSTETRIC: CPT

## 2025-02-03 ENCOUNTER — TELEPHONE (OUTPATIENT)
Dept: OBGYN CLINIC | Age: 35
End: 2025-02-03

## 2025-02-03 NOTE — TELEPHONE ENCOUNTER
----- Message from ALEX Torres CNP sent at 2/3/2025  3:16 PM EST -----  This is a repeat OB ultrasound  Gestational sac with questionable presence of yolk sac.  Lack of interval change noted.  Patient has decreasing quant HCG levels  Please schedule follow up with physician this week to discuss findings and plan of care.  Call office or go to ER with increase in vaginal bleeding, pelvic pain or fever.

## 2025-02-03 NOTE — TELEPHONE ENCOUNTER
[facility] Result Note Patient Contact Communication   [address] [place] [address]      02/03/25   4:25 PM     Patients Name: Alem Craft   Medical Record Number: [id number]   Contact Serial Number: [id number]  YOB: 1990       Patients Phone Number: [phone]     Description of Recommendations:  The patient was contacted and her identity was confirmed with two of the specific identifiers listed above.  The information regarding her recent testing results and provider recommendations were reviewed with her in detail and all questions were answered.   Recent labs/Cultures/ Imaging Studies/Pathology reports and Urinalysis results are included:      Recommendations given by provider:  This is a repeat OB ultrasound   Gestational sac with questionable presence of yolk sac.   Lack of interval change noted.   Patient has decreasing quant HCG levels   Please schedule follow up with physician this week to discuss findings and plan of care.   Call office or go to ER with increase in vaginal bleeding, pelvic pain or fever.       Decreasing quant HCG level   Please schedule follow up with physician this week to discuss findings and plan of care.   Call office or go to ER with increase in vaginal bleeding, pelvic pain or fever.     The patient verbalized understanding of the information above and the recommendation by the provider. She will contact her PCP if any other questions arise or contact our office for any other clarifications.        Electronically signed by Leonarda Bains on 2/3/2025 at 4:25 PM

## 2025-02-05 ENCOUNTER — OFFICE VISIT (OUTPATIENT)
Dept: OBGYN CLINIC | Age: 35
End: 2025-02-05
Payer: OTHER GOVERNMENT

## 2025-02-05 VITALS
SYSTOLIC BLOOD PRESSURE: 120 MMHG | BODY MASS INDEX: 29.81 KG/M2 | DIASTOLIC BLOOD PRESSURE: 76 MMHG | HEIGHT: 62 IN | WEIGHT: 162 LBS

## 2025-02-05 DIAGNOSIS — O36.80X0 PREGNANCY WITH INCONCLUSIVE FETAL VIABILITY, SINGLE OR UNSPECIFIED FETUS: Primary | ICD-10-CM

## 2025-02-05 DIAGNOSIS — O02.0 BLIGHTED OVUM: ICD-10-CM

## 2025-02-05 DIAGNOSIS — O03.9 SAB (SPONTANEOUS ABORTION): ICD-10-CM

## 2025-02-05 PROCEDURE — 99213 OFFICE O/P EST LOW 20 MIN: CPT | Performed by: OBSTETRICS & GYNECOLOGY

## 2025-02-05 RX ORDER — MISOPROSTOL 200 UG/1
TABLET ORAL
Qty: 15 TABLET | Refills: 0 | Status: SHIPPED | OUTPATIENT
Start: 2025-02-05

## 2025-02-05 NOTE — PROGRESS NOTES
Alem Carft  2025    YOB: 1990          The patient was seen today. She is here regarding follow up on blighted ovum. Pt states she began bleeding yesterday. Pts BHCG decreased from 63,742 to 25,108. Pt wishes to take cytotec at this time. Pt states it was not a planned pregnancy. Pt wishes to go on progesterone only OCs after she passes her products of conception. Pt wishes to try to conceive again in ~ 1 year. Pt was counseled on risks/ benefits/ alternative options of cytotec.  . Her bowels are regular and she is voiding without difficulty.     HPI:  Alem Craft is a 34 y.o. female        OB History    Para Term  AB Living   2 1 1 0 0 1   SAB IAB Ectopic Molar Multiple Live Births   0 0 0 0 0 1      # Outcome Date GA Lbr Cullen/2nd Weight Sex Type Anes PTL Lv   2 Current            1 Term  39w0d   M CS-LTranv  N HERNANDEZ       Past Medical History:   Diagnosis Date    Anxiety     Attention-deficit hyperactivity disorder 2021    Chronic low back pain     hx bulgin disc, used to see pain mgmt in [place]    COVID-19 2022    Headache, nausea, sinus congestion, achey, fever, chills  X1 week. repeat 3/2023    Depression     Endometriosis     Fibromyalgia 2020    Hx lyrica, no longer, also no longer taking Gabapentin, but still keeps on med list    Gastroesophageal reflux disease without esophagitis 2021    HPV (human papilloma virus) anogenital infection     Hyperhidrosis     PTSD (post-traumatic stress disorder)     Under care of team 2022    NEUROLOGY - DR. MOREJON - last visit 2022    Under care of team 2022    NEUROSURGERY - DR. ANDERSON - last visit 10/2022    Wears contact lenses 2022    Wears glasses 2022    Wellness examination 2022    PCP - DR. MALLORY - last viait - 2022       Past Surgical History:   Procedure Laterality Date    CARPAL TUNNEL RELEASE Right 2022    ULNAR NERVE TRANSPOSITION AND GUYON, CARPAL TUNNEL

## 2025-02-12 DIAGNOSIS — M54.50 CHRONIC LOW BACK PAIN, UNSPECIFIED BACK PAIN LATERALITY, UNSPECIFIED WHETHER SCIATICA PRESENT: ICD-10-CM

## 2025-02-12 DIAGNOSIS — G89.29 CHRONIC LOW BACK PAIN, UNSPECIFIED BACK PAIN LATERALITY, UNSPECIFIED WHETHER SCIATICA PRESENT: ICD-10-CM

## 2025-02-12 DIAGNOSIS — F32.A ANXIETY AND DEPRESSION: ICD-10-CM

## 2025-02-12 DIAGNOSIS — F41.9 ANXIETY AND DEPRESSION: ICD-10-CM

## 2025-02-12 NOTE — TELEPHONE ENCOUNTER
Patient is requesting a med refill on pended medication, patient states that she is currently benefiting form this medication and it was discontinued in error      Alem Craft is calling to request a refill on the following medication(s):    Last Visit Date (If Applicable):  4/3/2023    Next Visit Date:    2/14/2025    Medication Request:  Requested Prescriptions     Pending Prescriptions Disp Refills    DULoxetine (CYMBALTA) 30 MG extended release capsule 30 capsule 3     Sig: Take 1 capsule by mouth daily

## 2025-02-13 RX ORDER — DULOXETIN HYDROCHLORIDE 30 MG/1
30 CAPSULE, DELAYED RELEASE ORAL DAILY
Qty: 30 CAPSULE | Refills: 3 | Status: SHIPPED | OUTPATIENT
Start: 2025-02-13 | End: 2025-02-14 | Stop reason: SDUPTHER

## 2025-02-14 ENCOUNTER — OFFICE VISIT (OUTPATIENT)
Dept: FAMILY MEDICINE CLINIC | Age: 35
End: 2025-02-14

## 2025-02-14 VITALS
WEIGHT: 162 LBS | BODY MASS INDEX: 29.63 KG/M2 | DIASTOLIC BLOOD PRESSURE: 72 MMHG | SYSTOLIC BLOOD PRESSURE: 110 MMHG | HEART RATE: 86 BPM

## 2025-02-14 DIAGNOSIS — M54.50 CHRONIC LOW BACK PAIN, UNSPECIFIED BACK PAIN LATERALITY, UNSPECIFIED WHETHER SCIATICA PRESENT: ICD-10-CM

## 2025-02-14 DIAGNOSIS — G89.29 CHRONIC LOW BACK PAIN, UNSPECIFIED BACK PAIN LATERALITY, UNSPECIFIED WHETHER SCIATICA PRESENT: ICD-10-CM

## 2025-02-14 DIAGNOSIS — F33.1 MODERATE EPISODE OF RECURRENT MAJOR DEPRESSIVE DISORDER (HCC): Primary | ICD-10-CM

## 2025-02-14 DIAGNOSIS — F32.A ANXIETY AND DEPRESSION: ICD-10-CM

## 2025-02-14 DIAGNOSIS — F41.9 ANXIETY AND DEPRESSION: ICD-10-CM

## 2025-02-14 RX ORDER — DULOXETIN HYDROCHLORIDE 60 MG/1
60 CAPSULE, DELAYED RELEASE ORAL DAILY
Qty: 30 CAPSULE | Refills: 5 | Status: SHIPPED | OUTPATIENT
Start: 2025-02-14

## 2025-02-14 ASSESSMENT — COLUMBIA-SUICIDE SEVERITY RATING SCALE - C-SSRS
1. WITHIN THE PAST MONTH, HAVE YOU WISHED YOU WERE DEAD OR WISHED YOU COULD GO TO SLEEP AND NOT WAKE UP?: YES
3. HAVE YOU BEEN THINKING ABOUT HOW YOU MIGHT KILL YOURSELF?: YES
2. HAVE YOU ACTUALLY HAD ANY THOUGHTS OF KILLING YOURSELF?: YES
4. HAVE YOU HAD THESE THOUGHTS AND HAD SOME INTENTION OF ACTING ON THEM?: NO
5. HAVE YOU STARTED TO WORK OUT OR WORKED OUT THE DETAILS OF HOW TO KILL YOURSELF? DO YOU INTEND TO CARRY OUT THIS PLAN?: NO
6. HAVE YOU EVER DONE ANYTHING, STARTED TO DO ANYTHING, OR PREPARED TO DO ANYTHING TO END YOUR LIFE?: NO

## 2025-02-14 ASSESSMENT — PATIENT HEALTH QUESTIONNAIRE - PHQ9
6. FEELING BAD ABOUT YOURSELF - OR THAT YOU ARE A FAILURE OR HAVE LET YOURSELF OR YOUR FAMILY DOWN: MORE THAN HALF THE DAYS
SUM OF ALL RESPONSES TO PHQ QUESTIONS 1-9: 15
3. TROUBLE FALLING OR STAYING ASLEEP: MORE THAN HALF THE DAYS
8. MOVING OR SPEAKING SO SLOWLY THAT OTHER PEOPLE COULD HAVE NOTICED. OR THE OPPOSITE, BEING SO FIGETY OR RESTLESS THAT YOU HAVE BEEN MOVING AROUND A LOT MORE THAN USUAL: SEVERAL DAYS
SUM OF ALL RESPONSES TO PHQ QUESTIONS 1-9: 15
4. FEELING TIRED OR HAVING LITTLE ENERGY: MORE THAN HALF THE DAYS
5. POOR APPETITE OR OVEREATING: SEVERAL DAYS
2. FEELING DOWN, DEPRESSED OR HOPELESS: MORE THAN HALF THE DAYS
7. TROUBLE CONCENTRATING ON THINGS, SUCH AS READING THE NEWSPAPER OR WATCHING TELEVISION: MORE THAN HALF THE DAYS
10. IF YOU CHECKED OFF ANY PROBLEMS, HOW DIFFICULT HAVE THESE PROBLEMS MADE IT FOR YOU TO DO YOUR WORK, TAKE CARE OF THINGS AT HOME, OR GET ALONG WITH OTHER PEOPLE: VERY DIFFICULT
1. LITTLE INTEREST OR PLEASURE IN DOING THINGS: MORE THAN HALF THE DAYS
9. THOUGHTS THAT YOU WOULD BE BETTER OFF DEAD, OR OF HURTING YOURSELF: SEVERAL DAYS
SUM OF ALL RESPONSES TO PHQ9 QUESTIONS 1 & 2: 4
SUM OF ALL RESPONSES TO PHQ QUESTIONS 1-9: 15
SUM OF ALL RESPONSES TO PHQ QUESTIONS 1-9: 14

## 2025-02-14 NOTE — PROGRESS NOTES
5    3. Chronic low back pain, unspecified back pain laterality, unspecified whether sciatica present  Restart Cymbalta and treat with over-the-counter meds if needed.  - DULoxetine (CYMBALTA) 60 MG extended release capsule; Take 1 capsule by mouth daily  Dispense: 30 capsule; Refill: 5       Alem received counseling on the following healthy behaviors: nutrition, exercise, and medication adherence  Reviewed prior labs and health maintenance.  Continue current medications, diet and exercise.  Discussed use, benefit, and side effects of prescribed medications. Barriers to medication compliance addressed.   Patient given educational materials - see patient instructions.    All patient questions answered.  Patient voiced understanding.      Return if symptoms worsen or fail to improve, for anxiety, depression.        Electronically signed by Camila Fried MD on 2/14/25 at 10:14 AM EST

## 2025-03-13 ENCOUNTER — RESULTS FOLLOW-UP (OUTPATIENT)
Dept: OBGYN CLINIC | Age: 35
End: 2025-03-13

## 2025-03-13 ENCOUNTER — HOSPITAL ENCOUNTER (OUTPATIENT)
Age: 35
Setting detail: SPECIMEN
Discharge: HOME | End: 2025-03-13

## 2025-03-13 DIAGNOSIS — O03.9 SAB (SPONTANEOUS ABORTION): ICD-10-CM

## 2025-03-13 DIAGNOSIS — O03.9 SAB (SPONTANEOUS ABORTION): Primary | ICD-10-CM

## 2025-03-13 LAB — B-HCG SERPL EIA 3RD IS-ACNC: 191 MIU/ML

## 2025-03-13 NOTE — TELEPHONE ENCOUNTER
----- Message from ALEX Christiansen NP sent at 3/13/2025  3:25 PM EDT -----  Quant decreasing: SAB   Instruct to repeat quant every 2 weeks until negative  Instruct to abstain from intercourse until negative Quant  Instruct to wait 3 menstrual cycles before trying to conceive  Call office with increased pelvic pain, increased bleeding, fever

## 2025-03-13 NOTE — TELEPHONE ENCOUNTER
[facility] Result Note Patient Contact Communication   [address] [place] [address]      03/13/25   4:45 PM     Patients Name: Alem Craft   Medical Record Number: [id number]   Contact Serial Number: [id number]  YOB: 1990       Patients Phone Number: [phone]     Description of Recommendations:  The patient was contacted and her identity was confirmed with two of the specific identifiers listed above.  The information regarding her recent testing results and provider recommendations were reviewed with her in detail and all questions were answered.   Recent labs/Cultures/ Imaging Studies/Pathology reports and Urinalysis results are included:      Recommendations given by provider:  ----- Message from ALEX Christiansen NP sent at 3/13/2025  3:25 PM EDT -----  Quant decreasing: SAB   Instruct to repeat quant every 2 weeks until negative  Instruct to abstain from intercourse until negative Quant  Instruct to wait 3 menstrual cycles before trying to conceive  Call office with increased pelvic pain, increased bleeding, fever       Quant in epic.      The patient verbalized understanding of the information above and the recommendation by the provider. She will contact her PCP if any other questions arise or contact our office for any other clarifications.        Electronically signed by ALEM RIVERA RN on 3/13/2025 at 4:45 PM

## 2025-03-20 ENCOUNTER — APPOINTMENT (OUTPATIENT)
Dept: CT IMAGING | Facility: CLINIC | Age: 35
End: 2025-03-20
Payer: OTHER GOVERNMENT

## 2025-03-20 ENCOUNTER — HOSPITAL ENCOUNTER (EMERGENCY)
Facility: CLINIC | Age: 35
Discharge: HOME | End: 2025-03-20
Attending: EMERGENCY MEDICINE
Payer: OTHER GOVERNMENT

## 2025-03-20 VITALS
BODY MASS INDEX: 32.01 KG/M2 | SYSTOLIC BLOOD PRESSURE: 117 MMHG | OXYGEN SATURATION: 97 % | TEMPERATURE: 98.1 F | HEART RATE: 69 BPM | DIASTOLIC BLOOD PRESSURE: 82 MMHG | WEIGHT: 175 LBS | RESPIRATION RATE: 17 BRPM
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
## 2025-03-20 DIAGNOSIS — S16.1XXA ACUTE STRAIN OF NECK MUSCLE, INITIAL ENCOUNTER: ICD-10-CM

## 2025-03-20 PROCEDURE — 72125 CT NECK SPINE W/O DYE: CPT

## 2025-03-20 PROCEDURE — 70450 CT HEAD/BRAIN W/O DYE: CPT

## 2025-03-20 PROCEDURE — 99284 EMERGENCY DEPT VISIT MOD MDM: CPT

## 2025-03-20 PROCEDURE — 6370000000 HC RX 637 (ALT 250 FOR IP): Performed by: EMERGENCY MEDICINE

## 2025-03-20 RX ORDER — OMEPRAZOLE 20 MG/1
20 CAPSULE, DELAYED RELEASE ORAL DAILY
COMMUNITY

## 2025-03-20 RX ORDER — CYCLOBENZAPRINE HCL 10 MG
10 TABLET ORAL ONCE
Status: COMPLETED | OUTPATIENT
Start: 2025-03-20 | End: 2025-03-20

## 2025-03-20 RX ORDER — CYCLOBENZAPRINE HCL 5 MG
5 TABLET ORAL 3 TIMES DAILY PRN
Qty: 20 TABLET | Refills: 0 | Status: SHIPPED | OUTPATIENT
Start: 2025-03-20 | End: 2025-03-30

## 2025-03-20 RX ORDER — ACETAMINOPHEN 500 MG
1000 TABLET ORAL ONCE
Status: COMPLETED | OUTPATIENT
Start: 2025-03-20 | End: 2025-03-20

## 2025-03-20 RX ORDER — ONDANSETRON 4 MG/1
4 TABLET, ORALLY DISINTEGRATING ORAL ONCE
Status: COMPLETED | OUTPATIENT
Start: 2025-03-20 | End: 2025-03-20

## 2025-03-20 RX ADMIN — ACETAMINOPHEN 1000 MG: 500 TABLET ORAL at 17:31

## 2025-03-20 RX ADMIN — ONDANSETRON 4 MG: 4 TABLET, ORALLY DISINTEGRATING ORAL at 17:31

## 2025-03-20 RX ADMIN — CYCLOBENZAPRINE 10 MG: 10 TABLET, FILM COATED ORAL at 17:31
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
## 2025-03-20 NOTE — DISCHARGE INSTRUCTIONS
Take medications as prescribed  No driving if taking muscle relaxer  Heat as needed  Return immediately if any worsening symptoms or any other concerns    Please understand that early in the process of an illness or injury, an emergency department workup can be falsely reassuring.      Tell us how we did visit: [email or web address]   and let us know about your experience

## 2025-06-19 ENCOUNTER — APPOINTMENT (OUTPATIENT)
Dept: CT IMAGING | Age: 35
End: 2025-06-19
Payer: OTHER GOVERNMENT

## 2025-06-19 ENCOUNTER — HOSPITAL ENCOUNTER (EMERGENCY)
Age: 35
Discharge: HOME | End: 2025-06-19
Attending: EMERGENCY MEDICINE
Payer: OTHER GOVERNMENT

## 2025-06-19 ENCOUNTER — APPOINTMENT (OUTPATIENT)
Dept: GENERAL RADIOLOGY | Age: 35
End: 2025-06-19
Payer: OTHER GOVERNMENT

## 2025-06-19 VITALS
RESPIRATION RATE: 18 BRPM | BODY MASS INDEX: 32.2 KG/M2 | SYSTOLIC BLOOD PRESSURE: 119 MMHG | OXYGEN SATURATION: 98 % | DIASTOLIC BLOOD PRESSURE: 86 MMHG | TEMPERATURE: 97.5 F | WEIGHT: 175 LBS | HEART RATE: 97 BPM | HEIGHT: 62 IN

## 2025-06-19 DIAGNOSIS — S09.90XA CLOSED HEAD INJURY, INITIAL ENCOUNTER: Primary | ICD-10-CM
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
## 2025-06-19 PROCEDURE — 6360000002 HC RX W HCPCS: Performed by: PHYSICIAN ASSISTANT

## 2025-06-19 PROCEDURE — 70450 CT HEAD/BRAIN W/O DYE: CPT

## 2025-06-19 PROCEDURE — 72125 CT NECK SPINE W/O DYE: CPT

## 2025-06-19 PROCEDURE — 90715 TDAP VACCINE 7 YRS/> IM: CPT | Performed by: PHYSICIAN ASSISTANT

## 2025-06-19 PROCEDURE — 99284 EMERGENCY DEPT VISIT MOD MDM: CPT

## 2025-06-19 PROCEDURE — 73502 X-RAY EXAM HIP UNI 2-3 VIEWS: CPT

## 2025-06-19 PROCEDURE — 6370000000 HC RX 637 (ALT 250 FOR IP): Performed by: PHYSICIAN ASSISTANT

## 2025-06-19 PROCEDURE — 90471 IMMUNIZATION ADMIN: CPT | Performed by: PHYSICIAN ASSISTANT

## 2025-06-19 RX ORDER — IBUPROFEN 600 MG/1
600 TABLET, FILM COATED ORAL ONCE
Status: COMPLETED | OUTPATIENT
Start: 2025-06-19 | End: 2025-06-19

## 2025-06-19 RX ADMIN — IBUPROFEN 600 MG: 600 TABLET ORAL at 15:23

## 2025-06-19 RX ADMIN — TETANUS TOXOID, REDUCED DIPHTHERIA TOXOID AND ACELLULAR PERTUSSIS VACCINE, ADSORBED 0.5 ML: 5; 2.5; 8; 8; 2.5 SUSPENSION INTRAMUSCULAR at 15:23

## 2025-06-19 ASSESSMENT — LIFESTYLE VARIABLES
HOW MANY STANDARD DRINKS CONTAINING ALCOHOL DO YOU HAVE ON A TYPICAL DAY: 3 OR 4
HOW OFTEN DO YOU HAVE A DRINK CONTAINING ALCOHOL: 2-4 TIMES A MONTH

## 2025-06-19 ASSESSMENT — PAIN SCALES - GENERAL
PAINLEVEL_OUTOF10: 9
PAINLEVEL_OUTOF10: 9

## 2025-06-19 ASSESSMENT — ENCOUNTER SYMPTOMS
DOUBLE VISION: 0
BLURRED VISION: 0
VOMITING: 0
NAUSEA: 0

## 2025-06-19 ASSESSMENT — PAIN DESCRIPTION - LOCATION: LOCATION: HEAD

## 2025-06-19 ASSESSMENT — PAIN - FUNCTIONAL ASSESSMENT
PAIN_FUNCTIONAL_ASSESSMENT: 0-10
PAIN_FUNCTIONAL_ASSESSMENT: ACTIVITIES ARE NOT PREVENTED

## 2025-06-19 ASSESSMENT — PAIN DESCRIPTION - DESCRIPTORS: DESCRIPTORS: ACHING

## 2025-06-19 NOTE — ED PROVIDER NOTES
[facility] EMERGENCY DEPARTMENT  eMERGENCY dEPARTMENT eNCOUnter      Pt Name: Alem Craft  MRN: [id number]  Birthdate 1990  Date of evaluation: 6/19/25      CHIEF COMPLAINT       Chief Complaint   Patient presents with    Fall     Pt's foot slipped while walking and pt fell on left side and did hit head. Pt denies LOC. Pt is c/o head pain. Pt denies neck pain. Pt states she did fall on her left hip/leg/buttock area and having some pain there. Pt was able to ambulate without difficulty. Abrasion to left forehead    Head Injury         HISTORY OF PRESENT ILLNESS    Alem Craft is a 35 y.o. female who presents complaining of slipped and fell hit her head.  Ambulates without difficulty gait is steady she has a hematoma and abrasion to her left forehead  The history is provided by the patient.   Head Injury  Location:  Frontal  Time since incident:  1 hour  Mechanism of injury: fall    Fall:     Fall occurred: slipped and fell while at work, hit head no LOC, left hip pain.    Impact surface:  [place]    Point of impact:  Head    Entrapped after fall: no    Pain details:     Quality:  Aching    Severity:  Moderate    Duration:  1 hour    Timing:  Intermittent    Progression:  Waxing and waning  Chronicity:  New  Relieved by:  Nothing  Worsened by:  Nothing  Ineffective treatments:  None tried  Associated symptoms: headache    Associated symptoms: no blurred vision, no disorientation, no double vision, no focal weakness, no hearing loss, no loss of consciousness, no memory loss, no nausea, no neck pain, no numbness, no seizures, no tinnitus and no vomiting        REVIEW OF SYSTEMS       Review of Systems   HENT:  Negative for hearing loss and tinnitus.    Eyes:  Negative for blurred vision and double vision.   Gastrointestinal:  Negative for nausea and vomiting.   Musculoskeletal:  Positive for arthralgias. Negative for neck pain.   Neurological:  Positive for headaches. Negative for focal weakness, seizures,

## 2025-06-19 NOTE — DISCHARGE INSTRUCTIONS
Rest quiet dark room  Ice for pain and swelling  Tylenol or Motrin for discomfort  [facility] 1-2 days  CT head and neck show no acute abnormality besides a left frontal hematoma.

## 2025-06-19 NOTE — ED PROVIDER NOTES
[facility] EMERGENCY DEPARTMENT  eMERGENCY dEPARTMENT eNCOUnter   Independent Attestation     Pt Name: Alem Craft  MRN: [id number]  Birthdate 1990  Date of evaluation: 6/19/25       Alem Craft is a 35 y.o. female who presents with Fall (Pt's foot slipped while walking and pt fell on left side and did hit head. Pt denies LOC. Pt is c/o head pain. Pt denies neck pain. Pt states she did fall on her left hip/leg/buttock area and having some pain there. Pt was able to ambulate without difficulty. Abrasion to left forehead) and Head Injury        Based on the medical record, the care appears appropriate. I was personally available for consultation in the Emergency Department.    Jose A Simpson MD  Attending Emergency  Physician                Jose A Simpson MD  06/19/25 0931

## 2025-06-22 ENCOUNTER — HOSPITAL ENCOUNTER (EMERGENCY)
Facility: CLINIC | Age: 35
Discharge: HOME | End: 2025-06-22
Attending: EMERGENCY MEDICINE
Payer: OTHER GOVERNMENT

## 2025-06-22 VITALS
WEIGHT: 170 LBS | SYSTOLIC BLOOD PRESSURE: 124 MMHG | RESPIRATION RATE: 18 BRPM | HEIGHT: 62 IN | BODY MASS INDEX: 31.28 KG/M2 | HEART RATE: 81 BPM | TEMPERATURE: 98.2 F | DIASTOLIC BLOOD PRESSURE: 77 MMHG | OXYGEN SATURATION: 97 %

## 2025-06-22 DIAGNOSIS — F07.81 POST CONCUSSIVE SYNDROME: Primary | ICD-10-CM

## 2025-06-22 PROCEDURE — 96374 THER/PROPH/DIAG INJ IV PUSH: CPT

## 2025-06-22 PROCEDURE — 6360000002 HC RX W HCPCS: Performed by: EMERGENCY MEDICINE

## 2025-06-22 PROCEDURE — 6370000000 HC RX 637 (ALT 250 FOR IP): Performed by: EMERGENCY MEDICINE

## 2025-06-22 PROCEDURE — 99284 EMERGENCY DEPT VISIT MOD MDM: CPT

## 2025-06-22 PROCEDURE — 96372 THER/PROPH/DIAG INJ SC/IM: CPT

## 2025-06-22 PROCEDURE — 2580000003 HC RX 258: Performed by: EMERGENCY MEDICINE

## 2025-06-22 PROCEDURE — 96375 TX/PRO/DX INJ NEW DRUG ADDON: CPT

## 2025-06-22 RX ORDER — PROCHLORPERAZINE EDISYLATE 5 MG/ML
10 INJECTION INTRAMUSCULAR; INTRAVENOUS ONCE
Status: COMPLETED | OUTPATIENT
Start: 2025-06-22 | End: 2025-06-22

## 2025-06-22 RX ORDER — METHOCARBAMOL 500 MG/1
500 TABLET, FILM COATED ORAL 3 TIMES DAILY PRN
Qty: 15 TABLET | Refills: 0 | Status: SHIPPED | OUTPATIENT
Start: 2025-06-22 | End: 2025-06-27

## 2025-06-22 RX ORDER — KETOROLAC TROMETHAMINE 15 MG/ML
15 INJECTION, SOLUTION INTRAMUSCULAR; INTRAVENOUS ONCE
Status: COMPLETED | OUTPATIENT
Start: 2025-06-22 | End: 2025-06-22

## 2025-06-22 RX ORDER — ORPHENADRINE CITRATE 30 MG/ML
60 INJECTION INTRAMUSCULAR; INTRAVENOUS ONCE
Status: COMPLETED | OUTPATIENT
Start: 2025-06-22 | End: 2025-06-22

## 2025-06-22 RX ORDER — 0.9 % SODIUM CHLORIDE 0.9 %
1000 INTRAVENOUS SOLUTION INTRAVENOUS ONCE
Status: COMPLETED | OUTPATIENT
Start: 2025-06-22 | End: 2025-06-22

## 2025-06-22 RX ORDER — ACETAMINOPHEN 325 MG/1
650 TABLET ORAL EVERY 6 HOURS PRN
Qty: 60 TABLET | Refills: 0 | Status: SHIPPED | OUTPATIENT
Start: 2025-06-22

## 2025-06-22 RX ORDER — DIPHENHYDRAMINE HYDROCHLORIDE 50 MG/ML
25 INJECTION, SOLUTION INTRAMUSCULAR; INTRAVENOUS ONCE
Status: COMPLETED | OUTPATIENT
Start: 2025-06-22 | End: 2025-06-22

## 2025-06-22 RX ORDER — DEXAMETHASONE SODIUM PHOSPHATE 10 MG/ML
10 INJECTION, SOLUTION INTRAMUSCULAR; INTRAVENOUS ONCE
Status: COMPLETED | OUTPATIENT
Start: 2025-06-22 | End: 2025-06-22

## 2025-06-22 RX ORDER — NAPROXEN 500 MG/1
500 TABLET ORAL 2 TIMES DAILY PRN
Qty: 30 TABLET | Refills: 0 | Status: SHIPPED | OUTPATIENT
Start: 2025-06-22

## 2025-06-22 RX ORDER — BUTALBITAL, ACETAMINOPHEN AND CAFFEINE 50; 325; 40 MG/1; MG/1; MG/1
1 TABLET ORAL ONCE
Status: COMPLETED | OUTPATIENT
Start: 2025-06-22 | End: 2025-06-22

## 2025-06-22 RX ORDER — BUTALBITAL, ACETAMINOPHEN AND CAFFEINE 50; 325; 40 MG/1; MG/1; MG/1
1 TABLET ORAL EVERY 6 HOURS PRN
Qty: 20 TABLET | Refills: 0 | Status: SHIPPED | OUTPATIENT
Start: 2025-06-22

## 2025-06-22 RX ORDER — ONDANSETRON 4 MG/1
4 TABLET, ORALLY DISINTEGRATING ORAL EVERY 8 HOURS PRN
Qty: 10 TABLET | Refills: 0 | Status: SHIPPED | OUTPATIENT
Start: 2025-06-22

## 2025-06-22 RX ADMIN — DEXAMETHASONE SODIUM PHOSPHATE 10 MG: 10 INJECTION, SOLUTION INTRAMUSCULAR; INTRAVENOUS at 17:03

## 2025-06-22 RX ADMIN — SODIUM CHLORIDE 1000 ML: 9 INJECTION, SOLUTION INTRAVENOUS at 17:04

## 2025-06-22 RX ADMIN — DIPHENHYDRAMINE HYDROCHLORIDE 25 MG: 50 INJECTION INTRAMUSCULAR; INTRAVENOUS at 17:03

## 2025-06-22 RX ADMIN — KETOROLAC TROMETHAMINE 15 MG: 15 INJECTION, SOLUTION INTRAMUSCULAR; INTRAVENOUS at 17:03

## 2025-06-22 RX ADMIN — BUTALBITAL, ACETAMINOPHEN, AND CAFFEINE 1 TABLET: 325; 50; 40 TABLET ORAL at 17:26

## 2025-06-22 RX ADMIN — PROCHLORPERAZINE EDISYLATE 10 MG: 5 INJECTION INTRAMUSCULAR; INTRAVENOUS at 17:03

## 2025-06-22 RX ADMIN — ORPHENADRINE CITRATE 60 MG: 30 INJECTION, SOLUTION INTRAMUSCULAR; INTRAVENOUS at 17:03

## 2025-06-22 ASSESSMENT — PAIN - FUNCTIONAL ASSESSMENT: PAIN_FUNCTIONAL_ASSESSMENT: 0-10

## 2025-06-22 ASSESSMENT — PAIN SCALES - GENERAL
PAINLEVEL_OUTOF10: 7
PAINLEVEL_OUTOF10: 7
PAINLEVEL_OUTOF10: 4

## 2025-06-22 ASSESSMENT — PAIN DESCRIPTION - LOCATION: LOCATION: HEAD;NECK

## 2025-06-22 ASSESSMENT — PAIN DESCRIPTION - ORIENTATION: ORIENTATION: LEFT

## 2025-06-22 NOTE — ED PROVIDER NOTES
[facility]  [address] [address]  Phone: [phone]     Pt Name: Alem Craft  MRN: [id number]  Birthdate 1990  Date of evaluation: 25  PCP:  Camila Fried MD    CHIEF COMPLAINT       Chief Complaint   Patient presents with    Headache    Facial Pain    Neck Pain       HISTORY OF PRESENT ILLNESS  (Location/Symptom, Timing/Onset, Context/Setting, Quality, Duration, Modifying Factors, Severity.)    Alem Craft is a 35 y.o. female who presents with headache, hypersensitivity to the left side of her scalp, left-sided facial numbness, neck pain/spasm, sensitivity to light and sound.  She has chronic tinnitus which is not worse than usual.  No epistaxis.  She had a head injury after slipping and falling at work a few days ago.  Was evaluated at [facility] emergency department at that time.  Had negative imaging of her head, neck, pelvis/hip and was discharged home.  She states since then she has been miserable.  She does have a history of migraines, has been on medication for this in the past that was prescribed to her family doctor.  No prior nor current care provided by a neurologist.  There is no family or personal history of coagulation disorders.  There is no family or personal history of aneurysms.  She denies any repeat head injury since the initial incident.    PAST MEDICAL / SURGICAL / SOCIAL / FAMILY HISTORY    has a past medical history of Anxiety, Attention-deficit hyperactivity disorder, Chronic low back pain, COVID-19, Depression, Endometriosis, Fibromyalgia, Gastroesophageal reflux disease without esophagitis, HPV (human papilloma virus) anogenital infection, Hyperhidrosis, PTSD (post-traumatic stress disorder), Under care of team, Under care of team, Wears contact lenses, Wears glasses, and Wellness examination.     has a past surgical history that includes Tonsillectomy; [place] tooth extraction;  section (); Colposcopy; Endometrial

## 2025-07-21 ENCOUNTER — TELEPHONE (OUTPATIENT)
Dept: FAMILY MEDICINE CLINIC | Age: 35
End: 2025-07-21

## 2025-07-24 ENCOUNTER — OFFICE VISIT (OUTPATIENT)
Dept: FAMILY MEDICINE CLINIC | Age: 35
End: 2025-07-24
Payer: OTHER GOVERNMENT

## 2025-07-24 VITALS
WEIGHT: 173.4 LBS | SYSTOLIC BLOOD PRESSURE: 110 MMHG | DIASTOLIC BLOOD PRESSURE: 80 MMHG | BODY MASS INDEX: 31.72 KG/M2 | HEART RATE: 88 BPM

## 2025-07-24 DIAGNOSIS — F32.A ANXIETY AND DEPRESSION: ICD-10-CM

## 2025-07-24 DIAGNOSIS — G89.29 CHRONIC LOW BACK PAIN, UNSPECIFIED BACK PAIN LATERALITY, UNSPECIFIED WHETHER SCIATICA PRESENT: ICD-10-CM

## 2025-07-24 DIAGNOSIS — F41.9 ANXIETY AND DEPRESSION: ICD-10-CM

## 2025-07-24 DIAGNOSIS — S09.90XD CLOSED HEAD INJURY, SUBSEQUENT ENCOUNTER: Primary | ICD-10-CM

## 2025-07-24 DIAGNOSIS — H53.9 VISUAL DISTURBANCE: ICD-10-CM

## 2025-07-24 DIAGNOSIS — M54.50 CHRONIC LOW BACK PAIN, UNSPECIFIED BACK PAIN LATERALITY, UNSPECIFIED WHETHER SCIATICA PRESENT: ICD-10-CM

## 2025-07-24 DIAGNOSIS — G44.52 NEW PERSISTENT DAILY HEADACHE: ICD-10-CM

## 2025-07-24 PROCEDURE — 99214 OFFICE O/P EST MOD 30 MIN: CPT | Performed by: FAMILY MEDICINE

## 2025-07-24 RX ORDER — DULOXETIN HYDROCHLORIDE 60 MG/1
60 CAPSULE, DELAYED RELEASE ORAL DAILY
Qty: 30 CAPSULE | Refills: 5 | Status: SHIPPED | OUTPATIENT
Start: 2025-07-24

## 2025-07-24 SDOH — ECONOMIC STABILITY: FOOD INSECURITY: WITHIN THE PAST 12 MONTHS, THE FOOD YOU BOUGHT JUST DIDN'T LAST AND YOU DIDN'T HAVE MONEY TO GET MORE.: NEVER TRUE

## 2025-07-24 SDOH — ECONOMIC STABILITY: FOOD INSECURITY: WITHIN THE PAST 12 MONTHS, YOU WORRIED THAT YOUR FOOD WOULD RUN OUT BEFORE YOU GOT MONEY TO BUY MORE.: NEVER TRUE

## 2025-07-24 NOTE — PROGRESS NOTES
[facility]  [facility]  [address]  [address]  [facility] [address]  Dept: [phone]    7/24/2025    CHIEF COMPLAINT    Chief Complaint   Patient presents with    Concussion       HPI    Alem Craft is a 35 y.o. female who presents   Chief Complaint   Patient presents with    Concussion   .  HPI  History of Present Illness  The patient is a 35-year-old female presenting for a recheck after head injuries in 03/2025 and 06/2025. The second injury occurred from a slip on concrete at work, not covered by worker's compensation.    Head Injuries and Associated Symptoms  She reports numbness and tingling in the left upper forehead for a month, intermittent tension headaches in the front, crown, and behind both eyes, memory issues, and speech difficulties. Symptoms worsened after the recent fall, with more frequent headaches. No earaches or bleeding. Vision issues include occasional blurriness and seeing small dots since the concussion in 06/2025. Headaches for the past three days have disrupted sleep. She has seen a neurosurgeon, Dr. Melendez, but not a neurologist or had an MRI. She has an ophthalmologist at "[facility] but hasn't seen them since the fall. Prescribed glasses for astigmatism after an eye exam in 05/2025. Taking Tylenol and naproxen for pain relief.  - Onset: Numbness and tingling for a month; vision issues since concussion in 06/2025; headaches for the past three days.  - Location: Left upper forehead (numbness and tingling); front, crown, and behind both eyes (headaches).  - Duration: Numbness and tingling for a month; intermittent headaches; vision issues since 06/2025; headaches disrupting sleep for the past three days.  - Character: Numbness and tingling; tension headaches; memory issues; speech difficulties; occasional blurriness and seeing small dots.  - Alleviating/Aggravating Factors: Symptoms worsened after recent fall; taking Tylenol

## 2025-08-14 DIAGNOSIS — G89.29 CHRONIC LOW BACK PAIN, UNSPECIFIED BACK PAIN LATERALITY, UNSPECIFIED WHETHER SCIATICA PRESENT: ICD-10-CM

## 2025-08-14 DIAGNOSIS — F32.A ANXIETY AND DEPRESSION: ICD-10-CM

## 2025-08-14 DIAGNOSIS — M54.50 CHRONIC LOW BACK PAIN, UNSPECIFIED BACK PAIN LATERALITY, UNSPECIFIED WHETHER SCIATICA PRESENT: ICD-10-CM

## 2025-08-14 DIAGNOSIS — F41.9 ANXIETY AND DEPRESSION: ICD-10-CM

## 2025-08-14 RX ORDER — DULOXETIN HYDROCHLORIDE 60 MG/1
60 CAPSULE, DELAYED RELEASE ORAL DAILY
Qty: 90 CAPSULE | Refills: 5 | Status: SHIPPED | OUTPATIENT
Start: 2025-08-14

## (undated) DEVICE — SUTURE VCRL SZ 3-0 L18IN ABSRB UD L26MM SH 1/2 CIR J864D

## (undated) DEVICE — GLOVE ORANGE PI 8   MSG9080

## (undated) DEVICE — SURGICAL SUCTION CONNECTING TUBE WITH MALE CONNECTOR AND SUCTION CLAMP, 2 FT. LONG (.6 M), 5 MM I.D.: Brand: CONMED

## (undated) DEVICE — DRAPE,REIN 53X77,STERILE: Brand: MEDLINE

## (undated) DEVICE — GARMENT,MEDLINE,DVT,INT,CALF,MED, GEN2: Brand: MEDLINE

## (undated) DEVICE — TUBING, SUCTION, 9/32" X 20', STRAIGHT: Brand: MEDLINE INDUSTRIES, INC.

## (undated) DEVICE — DRESSING BORDERED ADH GZ UNIV GEN USE 8INX4IN AND 6INX2IN

## (undated) DEVICE — ELECTRODE PT RET AD L9FT HI MOIST COND ADH HYDRGEL CORDED

## (undated) DEVICE — GAUZE,SPONGE,4"X4",16PLY,XRAY,STRL,LF: Brand: MEDLINE

## (undated) DEVICE — INTENDED FOR TISSUE SEPARATION, AND OTHER PROCEDURES THAT REQUIRE A SHARP SURGICAL BLADE TO PUNCTURE OR CUT.: Brand: BARD-PARKER ® CARBON RIB-BACK BLADES

## (undated) DEVICE — CONNECTOR TBNG WHT PLAS SUCT STR 5IN1 LTWT W/ M CONN

## (undated) DEVICE — 3M™ STERI-STRIP™ COMPOUND BENZOIN TINCTURE 40 BAGS/CARTON 4 CARTONS/CASE C1544: Brand: 3M™ STERI-STRIP™

## (undated) DEVICE — GLOVE ORANGE PI 7   MSG9070

## (undated) DEVICE — BLADE ES ELASTOMERIC COAT INSUL DURABLE BEND UPTO 90DEG

## (undated) DEVICE — STOCKINETTE,IMPERVIOUS,12X48,STERILE: Brand: MEDLINE

## (undated) DEVICE — LOOP VES W13MM THK09MM MINI RED SIL FLD REPELLENT

## (undated) DEVICE — GAUZE,SPONGE,FLUFF,6"X6.75",STRL,5/TRAY: Brand: MEDLINE

## (undated) DEVICE — SVMMC HND

## (undated) DEVICE — COVER LT HNDL BLU PLAS

## (undated) DEVICE — STRIP,CLOSURE,WOUND,MEDI-STRIP,1/2X4: Brand: MEDLINE

## (undated) DEVICE — BNDG,ELSTC,MATRIX,STRL,4"X5YD,LF,HOOK&LP: Brand: MEDLINE

## (undated) DEVICE — GOWN,AURORA,NONREINFORCED,LARGE: Brand: MEDLINE

## (undated) DEVICE — DISPOSABLE IRRIGATION BIPOLAR CORD, M1000 TYPE: Brand: KIRWAN

## (undated) DEVICE — SUTURE NONABSORBABLE MONOFILAMENT 3-0 PS-1 18 IN BLK ETHILON 1663H

## (undated) DEVICE — MARKER,SKIN,WI/RULER AND LABELS: Brand: MEDLINE

## (undated) DEVICE — APPLICATOR MEDICATED 26 CC SOLUTION HI LT ORNG CHLORAPREP

## (undated) DEVICE — GLOVE ORANGE PI 7 1/2   MSG9075

## (undated) DEVICE — DRESSING TRNSPAR W2XL2.75IN FLM SHT SEMIPERMEABLE WIND

## (undated) DEVICE — STERILE POLYISOPRENE POWDER-FREE SURGICAL GLOVES WITH EMOLLIENT COATING: Brand: PROTEXIS

## (undated) DEVICE — BANDAGE,GAUZE,BULKEE II,4.5"X4.1YD,STRL: Brand: MEDLINE

## (undated) DEVICE — DRESSING BORDERED ADH GZ UNIV GEN USE 5IN 4IN AND 2 1/2IN

## (undated) DEVICE — COVER,MAYO STAND,STERILE: Brand: MEDLINE

## (undated) DEVICE — SYRINGE IRRIG 60ML SFT PLIABLE BLB EZ TO GRP 1 HND USE W/